# Patient Record
Sex: MALE | Race: WHITE | NOT HISPANIC OR LATINO | Employment: FULL TIME | RURAL
[De-identification: names, ages, dates, MRNs, and addresses within clinical notes are randomized per-mention and may not be internally consistent; named-entity substitution may affect disease eponyms.]

---

## 2021-03-03 ENCOUNTER — HISTORICAL (OUTPATIENT)
Dept: ADMINISTRATIVE | Facility: HOSPITAL | Age: 46
End: 2021-03-03

## 2021-03-07 LAB
REPORT: 25
REPORT: 25
REPORT: 3
REPORT: NORMAL

## 2021-03-31 ENCOUNTER — DOCUMENTATION ONLY (OUTPATIENT)
Dept: DERMATOLOGY | Facility: CLINIC | Age: 46
End: 2021-03-31

## 2021-08-05 DIAGNOSIS — I10 HYPERTENSION, UNSPECIFIED TYPE: Primary | ICD-10-CM

## 2021-08-05 RX ORDER — MELOXICAM 15 MG/1
1 TABLET ORAL DAILY
COMMUNITY
Start: 2021-04-19 | End: 2021-08-05 | Stop reason: SDUPTHER

## 2021-08-05 RX ORDER — HYDROCHLOROTHIAZIDE 25 MG/1
1 TABLET ORAL DAILY
COMMUNITY
Start: 2021-04-19 | End: 2021-08-05 | Stop reason: SDUPTHER

## 2021-08-05 RX ORDER — HYDROCHLOROTHIAZIDE 25 MG/1
25 TABLET ORAL DAILY
Qty: 30 TABLET | Refills: 0 | Status: SHIPPED | OUTPATIENT
Start: 2021-08-05 | End: 2021-09-16 | Stop reason: SDUPTHER

## 2021-08-05 RX ORDER — MELOXICAM 15 MG/1
15 TABLET ORAL DAILY
Qty: 30 TABLET | Refills: 0 | Status: SHIPPED | OUTPATIENT
Start: 2021-08-05 | End: 2021-09-16 | Stop reason: SDUPTHER

## 2021-09-13 ENCOUNTER — TELEPHONE (OUTPATIENT)
Dept: FAMILY MEDICINE | Facility: CLINIC | Age: 46
End: 2021-09-13

## 2021-09-16 ENCOUNTER — OFFICE VISIT (OUTPATIENT)
Dept: FAMILY MEDICINE | Facility: CLINIC | Age: 46
End: 2021-09-16
Payer: COMMERCIAL

## 2021-09-16 VITALS
RESPIRATION RATE: 18 BRPM | TEMPERATURE: 97 F | DIASTOLIC BLOOD PRESSURE: 86 MMHG | HEART RATE: 80 BPM | HEIGHT: 78 IN | WEIGHT: 315 LBS | SYSTOLIC BLOOD PRESSURE: 134 MMHG | BODY MASS INDEX: 36.45 KG/M2

## 2021-09-16 DIAGNOSIS — I10 ESSENTIAL HYPERTENSION: Primary | ICD-10-CM

## 2021-09-16 DIAGNOSIS — M15.9 OSTEOARTHRITIS OF MULTIPLE JOINTS, UNSPECIFIED OSTEOARTHRITIS TYPE: Chronic | ICD-10-CM

## 2021-09-16 DIAGNOSIS — I10 HYPERTENSION, UNSPECIFIED TYPE: ICD-10-CM

## 2021-09-16 LAB
BASOPHILS # BLD AUTO: 0.06 K/UL (ref 0–0.2)
BASOPHILS NFR BLD AUTO: 0.8 % (ref 0–1)
BILIRUB UR QL STRIP: NEGATIVE
CAOX CRY #/AREA URNS LPF: ABNORMAL /LPF
CLARITY UR: CLEAR
COLOR UR: YELLOW
DIFFERENTIAL METHOD BLD: ABNORMAL
EOSINOPHIL # BLD AUTO: 0.18 K/UL (ref 0–0.5)
EOSINOPHIL NFR BLD AUTO: 2.5 % (ref 1–4)
ERYTHROCYTE [DISTWIDTH] IN BLOOD BY AUTOMATED COUNT: 14.6 % (ref 11.5–14.5)
GLUCOSE UR STRIP-MCNC: NEGATIVE MG/DL
HCT VFR BLD AUTO: 46.8 % (ref 40–54)
HGB BLD-MCNC: 15.1 G/DL (ref 13.5–18)
IMM GRANULOCYTES # BLD AUTO: 0.02 K/UL (ref 0–0.04)
IMM GRANULOCYTES NFR BLD: 0.3 % (ref 0–0.4)
KETONES UR STRIP-SCNC: NEGATIVE MG/DL
LEUKOCYTE ESTERASE UR QL STRIP: NEGATIVE
LYMPHOCYTES # BLD AUTO: 2.14 K/UL (ref 1–4.8)
LYMPHOCYTES NFR BLD AUTO: 29.8 % (ref 27–41)
MCH RBC QN AUTO: 29.4 PG (ref 27–31)
MCHC RBC AUTO-ENTMCNC: 32.3 G/DL (ref 32–36)
MCV RBC AUTO: 91.2 FL (ref 80–96)
MONOCYTES # BLD AUTO: 0.48 K/UL (ref 0–0.8)
MONOCYTES NFR BLD AUTO: 6.7 % (ref 2–6)
MPC BLD CALC-MCNC: 9.5 FL (ref 9.4–12.4)
MUCOUS THREADS #/AREA URNS HPF: ABNORMAL /HPF
NEUTROPHILS # BLD AUTO: 4.31 K/UL (ref 1.8–7.7)
NEUTROPHILS NFR BLD AUTO: 59.9 % (ref 53–65)
NITRITE UR QL STRIP: NEGATIVE
NRBC # BLD AUTO: 0 X10E3/UL
NRBC, AUTO (.00): 0 %
PH UR STRIP: 6 PH UNITS
PLATELET # BLD AUTO: 259 K/UL (ref 150–400)
PROT UR QL STRIP: NEGATIVE
RBC # BLD AUTO: 5.13 M/UL (ref 4.6–6.2)
RBC # UR STRIP: ABNORMAL /UL
RBC #/AREA URNS HPF: ABNORMAL /HPF
SP GR UR STRIP: 1.01
SQUAMOUS #/AREA URNS LPF: ABNORMAL /LPF
UROBILINOGEN UR STRIP-ACNC: 0.2 MG/DL
WBC # BLD AUTO: 7.19 K/UL (ref 4.5–11)
WBC #/AREA URNS HPF: ABNORMAL /HPF

## 2021-09-16 PROCEDURE — 82043 UR ALBUMIN QUANTITATIVE: CPT | Mod: ,,, | Performed by: CLINICAL MEDICAL LABORATORY

## 2021-09-16 PROCEDURE — 1160F RVW MEDS BY RX/DR IN RCRD: CPT | Mod: ,,, | Performed by: FAMILY MEDICINE

## 2021-09-16 PROCEDURE — 3075F PR MOST RECENT SYSTOLIC BLOOD PRESS GE 130-139MM HG: ICD-10-PCS | Mod: ,,, | Performed by: FAMILY MEDICINE

## 2021-09-16 PROCEDURE — 81001 URINALYSIS, REFLEX TO URINE CULTURE: ICD-10-PCS | Mod: ,,, | Performed by: CLINICAL MEDICAL LABORATORY

## 2021-09-16 PROCEDURE — 3079F DIAST BP 80-89 MM HG: CPT | Mod: ,,, | Performed by: FAMILY MEDICINE

## 2021-09-16 PROCEDURE — 1159F MED LIST DOCD IN RCRD: CPT | Mod: ,,, | Performed by: FAMILY MEDICINE

## 2021-09-16 PROCEDURE — 82043 MICROALBUMIN / CREATININE RATIO URINE: ICD-10-PCS | Mod: ,,, | Performed by: CLINICAL MEDICAL LABORATORY

## 2021-09-16 PROCEDURE — 80048 BASIC METABOLIC PANEL: ICD-10-PCS | Mod: ,,, | Performed by: CLINICAL MEDICAL LABORATORY

## 2021-09-16 PROCEDURE — 80048 BASIC METABOLIC PNL TOTAL CA: CPT | Mod: ,,, | Performed by: CLINICAL MEDICAL LABORATORY

## 2021-09-16 PROCEDURE — 3079F PR MOST RECENT DIASTOLIC BLOOD PRESSURE 80-89 MM HG: ICD-10-PCS | Mod: ,,, | Performed by: FAMILY MEDICINE

## 2021-09-16 PROCEDURE — 82570 MICROALBUMIN / CREATININE RATIO URINE: ICD-10-PCS | Mod: ,,, | Performed by: CLINICAL MEDICAL LABORATORY

## 2021-09-16 PROCEDURE — 1160F PR REVIEW ALL MEDS BY PRESCRIBER/CLIN PHARMACIST DOCUMENTED: ICD-10-PCS | Mod: ,,, | Performed by: FAMILY MEDICINE

## 2021-09-16 PROCEDURE — 80061 LIPID PANEL: ICD-10-PCS | Mod: ,,, | Performed by: CLINICAL MEDICAL LABORATORY

## 2021-09-16 PROCEDURE — 99214 PR OFFICE/OUTPT VISIT, EST, LEVL IV, 30-39 MIN: ICD-10-PCS | Mod: ,,, | Performed by: FAMILY MEDICINE

## 2021-09-16 PROCEDURE — 99214 OFFICE O/P EST MOD 30 MIN: CPT | Mod: ,,, | Performed by: FAMILY MEDICINE

## 2021-09-16 PROCEDURE — 85025 CBC WITH DIFFERENTIAL: ICD-10-PCS | Mod: ,,, | Performed by: CLINICAL MEDICAL LABORATORY

## 2021-09-16 PROCEDURE — 3075F SYST BP GE 130 - 139MM HG: CPT | Mod: ,,, | Performed by: FAMILY MEDICINE

## 2021-09-16 PROCEDURE — 85025 COMPLETE CBC W/AUTO DIFF WBC: CPT | Mod: ,,, | Performed by: CLINICAL MEDICAL LABORATORY

## 2021-09-16 PROCEDURE — 81001 URINALYSIS AUTO W/SCOPE: CPT | Mod: ,,, | Performed by: CLINICAL MEDICAL LABORATORY

## 2021-09-16 PROCEDURE — 3008F PR BODY MASS INDEX (BMI) DOCUMENTED: ICD-10-PCS | Mod: ,,, | Performed by: FAMILY MEDICINE

## 2021-09-16 PROCEDURE — 1159F PR MEDICATION LIST DOCUMENTED IN MEDICAL RECORD: ICD-10-PCS | Mod: ,,, | Performed by: FAMILY MEDICINE

## 2021-09-16 PROCEDURE — 3008F BODY MASS INDEX DOCD: CPT | Mod: ,,, | Performed by: FAMILY MEDICINE

## 2021-09-16 PROCEDURE — 80061 LIPID PANEL: CPT | Mod: ,,, | Performed by: CLINICAL MEDICAL LABORATORY

## 2021-09-16 PROCEDURE — 82570 ASSAY OF URINE CREATININE: CPT | Mod: ,,, | Performed by: CLINICAL MEDICAL LABORATORY

## 2021-09-16 RX ORDER — HYDROCHLOROTHIAZIDE 25 MG/1
25 TABLET ORAL DAILY
Qty: 90 TABLET | Refills: 1 | Status: SHIPPED | OUTPATIENT
Start: 2021-09-16 | End: 2021-12-01 | Stop reason: SDUPTHER

## 2021-09-16 RX ORDER — MELOXICAM 15 MG/1
15 TABLET ORAL DAILY
Qty: 90 TABLET | Refills: 1 | Status: SHIPPED | OUTPATIENT
Start: 2021-09-16 | End: 2021-12-01 | Stop reason: SDUPTHER

## 2021-09-17 LAB
ANION GAP SERPL CALCULATED.3IONS-SCNC: 10 MMOL/L (ref 7–16)
BUN SERPL-MCNC: 10 MG/DL (ref 7–18)
BUN/CREAT SERPL: 11 (ref 6–20)
CALCIUM SERPL-MCNC: 9.1 MG/DL (ref 8.5–10.1)
CHLORIDE SERPL-SCNC: 109 MMOL/L (ref 98–107)
CHOLEST SERPL-MCNC: 133 MG/DL (ref 0–200)
CHOLEST/HDLC SERPL: 3.9 {RATIO}
CO2 SERPL-SCNC: 25 MMOL/L (ref 21–32)
CREAT SERPL-MCNC: 0.88 MG/DL (ref 0.7–1.3)
GLUCOSE SERPL-MCNC: 105 MG/DL (ref 74–106)
HDLC SERPL-MCNC: 34 MG/DL (ref 40–60)
LDLC SERPL CALC-MCNC: 77 MG/DL
LDLC/HDLC SERPL: 2.3 {RATIO}
NONHDLC SERPL-MCNC: 99 MG/DL
POTASSIUM SERPL-SCNC: 3.3 MMOL/L (ref 3.5–5.1)
SODIUM SERPL-SCNC: 141 MMOL/L (ref 136–145)
TRIGL SERPL-MCNC: 111 MG/DL (ref 35–150)
VLDLC SERPL-MCNC: 22 MG/DL

## 2021-09-22 LAB
CREAT UR-MCNC: 187 MG/DL (ref 39–259)
MICROALBUMIN UR-MCNC: 1.3 MG/DL (ref 0–2.8)
MICROALBUMIN/CREAT RATIO PNL UR: 7 MG/G (ref 0–30)

## 2021-11-24 ENCOUNTER — CLINICAL SUPPORT (OUTPATIENT)
Dept: FAMILY MEDICINE | Facility: CLINIC | Age: 46
End: 2021-11-24
Payer: COMMERCIAL

## 2021-11-24 PROCEDURE — 90471 FLU VACCINE (QUAD) GREATER THAN OR EQUAL TO 3YO PRESERVATIVE FREE IM: ICD-10-PCS | Mod: ,,, | Performed by: FAMILY MEDICINE

## 2021-11-24 PROCEDURE — 90686 IIV4 VACC NO PRSV 0.5 ML IM: CPT | Mod: ,,, | Performed by: FAMILY MEDICINE

## 2021-11-24 PROCEDURE — 90471 IMMUNIZATION ADMIN: CPT | Mod: ,,, | Performed by: FAMILY MEDICINE

## 2021-11-24 PROCEDURE — 90686 FLU VACCINE (QUAD) GREATER THAN OR EQUAL TO 3YO PRESERVATIVE FREE IM: ICD-10-PCS | Mod: ,,, | Performed by: FAMILY MEDICINE

## 2021-12-01 DIAGNOSIS — I10 ESSENTIAL HYPERTENSION: Chronic | ICD-10-CM

## 2021-12-01 DIAGNOSIS — M15.9 OSTEOARTHRITIS OF MULTIPLE JOINTS, UNSPECIFIED OSTEOARTHRITIS TYPE: Chronic | ICD-10-CM

## 2021-12-03 RX ORDER — MELOXICAM 15 MG/1
15 TABLET ORAL DAILY
Qty: 90 TABLET | Refills: 1 | Status: SHIPPED | OUTPATIENT
Start: 2021-12-03 | End: 2022-02-01 | Stop reason: SDUPTHER

## 2021-12-03 RX ORDER — HYDROCHLOROTHIAZIDE 25 MG/1
25 TABLET ORAL DAILY
Qty: 90 TABLET | Refills: 1 | Status: SHIPPED | OUTPATIENT
Start: 2021-12-03 | End: 2022-02-01 | Stop reason: SDUPTHER

## 2022-02-01 ENCOUNTER — OFFICE VISIT (OUTPATIENT)
Dept: FAMILY MEDICINE | Facility: CLINIC | Age: 47
End: 2022-02-01
Payer: COMMERCIAL

## 2022-02-01 VITALS
DIASTOLIC BLOOD PRESSURE: 88 MMHG | OXYGEN SATURATION: 94 % | HEIGHT: 78 IN | WEIGHT: 315 LBS | HEART RATE: 80 BPM | RESPIRATION RATE: 18 BRPM | BODY MASS INDEX: 36.45 KG/M2 | SYSTOLIC BLOOD PRESSURE: 154 MMHG

## 2022-02-01 DIAGNOSIS — I10 ESSENTIAL HYPERTENSION: Chronic | ICD-10-CM

## 2022-02-01 DIAGNOSIS — M15.9 OSTEOARTHRITIS OF MULTIPLE JOINTS, UNSPECIFIED OSTEOARTHRITIS TYPE: Chronic | ICD-10-CM

## 2022-02-01 DIAGNOSIS — L72.3 SEBACEOUS CYST OF AXILLA: Primary | ICD-10-CM

## 2022-02-01 PROCEDURE — 3079F DIAST BP 80-89 MM HG: CPT | Mod: ,,, | Performed by: FAMILY MEDICINE

## 2022-02-01 PROCEDURE — 3008F BODY MASS INDEX DOCD: CPT | Mod: ,,, | Performed by: FAMILY MEDICINE

## 2022-02-01 PROCEDURE — 1159F PR MEDICATION LIST DOCUMENTED IN MEDICAL RECORD: ICD-10-PCS | Mod: ,,, | Performed by: FAMILY MEDICINE

## 2022-02-01 PROCEDURE — 3077F SYST BP >= 140 MM HG: CPT | Mod: ,,, | Performed by: FAMILY MEDICINE

## 2022-02-01 PROCEDURE — 1159F MED LIST DOCD IN RCRD: CPT | Mod: ,,, | Performed by: FAMILY MEDICINE

## 2022-02-01 PROCEDURE — 3077F PR MOST RECENT SYSTOLIC BLOOD PRESSURE >= 140 MM HG: ICD-10-PCS | Mod: ,,, | Performed by: FAMILY MEDICINE

## 2022-02-01 PROCEDURE — 3079F PR MOST RECENT DIASTOLIC BLOOD PRESSURE 80-89 MM HG: ICD-10-PCS | Mod: ,,, | Performed by: FAMILY MEDICINE

## 2022-02-01 PROCEDURE — 3008F PR BODY MASS INDEX (BMI) DOCUMENTED: ICD-10-PCS | Mod: ,,, | Performed by: FAMILY MEDICINE

## 2022-02-01 PROCEDURE — 99214 OFFICE O/P EST MOD 30 MIN: CPT | Mod: ,,, | Performed by: FAMILY MEDICINE

## 2022-02-01 PROCEDURE — 99214 PR OFFICE/OUTPT VISIT, EST, LEVL IV, 30-39 MIN: ICD-10-PCS | Mod: ,,, | Performed by: FAMILY MEDICINE

## 2022-02-01 PROCEDURE — 1160F PR REVIEW ALL MEDS BY PRESCRIBER/CLIN PHARMACIST DOCUMENTED: ICD-10-PCS | Mod: ,,, | Performed by: FAMILY MEDICINE

## 2022-02-01 PROCEDURE — 1160F RVW MEDS BY RX/DR IN RCRD: CPT | Mod: ,,, | Performed by: FAMILY MEDICINE

## 2022-02-01 RX ORDER — MELOXICAM 15 MG/1
15 TABLET ORAL DAILY
Qty: 90 TABLET | Refills: 1 | Status: SHIPPED | OUTPATIENT
Start: 2022-02-01 | End: 2022-02-22 | Stop reason: SDUPTHER

## 2022-02-01 RX ORDER — HYDROCHLOROTHIAZIDE 25 MG/1
25 TABLET ORAL DAILY
Qty: 90 TABLET | Refills: 1 | Status: SHIPPED | OUTPATIENT
Start: 2022-02-01 | End: 2022-02-22 | Stop reason: SDUPTHER

## 2022-02-01 RX ORDER — SULFAMETHOXAZOLE AND TRIMETHOPRIM 800; 160 MG/1; MG/1
1 TABLET ORAL 2 TIMES DAILY
Qty: 20 TABLET | Refills: 0 | Status: SHIPPED | OUTPATIENT
Start: 2022-02-01 | End: 2022-02-22 | Stop reason: ALTCHOICE

## 2022-02-01 NOTE — PROGRESS NOTES
Spike Johnson MD    [address] [place] [address]     PATIENT NAME: Jason Chaudhary  : 1975  DATE: 22  MRN: [id number]      Billing Provider: Spike Johnson MD  Level of Service:   Patient PCP Information     Provider PCP Type    Spike Johnson MD General          Reason for Visit / Chief Complaint: Sore (Patient has a place under his right arm that has been there about a week and won't heal. He states it started out like a zit and it burst and keeps draining. States it is a little painful)       Update PCP  Update Chief Complaint         History of Present Illness / Problem Focused Workflow     Jason Chaudhary presents to the clinic with Sore (Patient has a place under his right arm that has been there about a week and won't heal. He states it started out like a zit and it burst and keeps draining. States it is a little painful)     HPI    Review of Systems     Review of Systems   Constitutional: Negative for activity change, appetite change, chills, fatigue and fever.   HENT: Negative for nasal congestion, ear pain, hearing loss, postnasal drip and sore throat.    Respiratory: Negative for cough, chest tightness, shortness of breath and wheezing.    Cardiovascular: Negative for chest pain, palpitations, leg swelling and claudication.   Gastrointestinal: Negative for abdominal pain, change in bowel habit, constipation, diarrhea, nausea, vomiting and change in bowel habit.   Genitourinary: Negative for dysuria.   Musculoskeletal: Negative for arthralgias, back pain, gait problem and myalgias.   Integumentary:  Negative for rash.   Neurological: Negative for weakness and headaches.   Psychiatric/Behavioral: Negative for suicidal ideas. The patient is not nervous/anxious.         Medical / Social / Family History     History reviewed. No pertinent past medical history.    History reviewed. No pertinent surgical  history.    Social History    reports that he has never smoked. He has never used smokeless tobacco. He reports that he does not drink alcohol and does not use drugs.    Family History  's family history includes Diabetes in his father; Hypertension in his father and mother.    Medications and Allergies     Medications  Outpatient Medications Marked as Taking for the 2/1/22 encounter (Office Visit) with Spike Johnson MD   Medication Sig Dispense Refill    [DISCONTINUED] hydroCHLOROthiazide (HYDRODIURIL) 25 MG tablet Take 1 tablet (25 mg total) by mouth once daily. For BLOOD PRESSURE 90 tablet 1    [DISCONTINUED] meloxicam (MOBIC) 15 MG tablet Take 1 tablet (15 mg total) by mouth once daily. For PAIN and INFLAMMATION 90 tablet 1       Allergies  Review of patient's allergies indicates:   Allergen Reactions    Pcn [penicillins]     Penicillin g potassium Rash       Physical Examination     Vitals:    02/01/22 0906   BP: (!) 154/88   Pulse:    Resp:      Physical Exam  Vitals and nursing note reviewed.   Constitutional:       General: He is not in acute distress.     Appearance: Normal appearance. He is not ill-appearing.   Eyes:      Extraocular Movements: Extraocular movements intact.      Pupils: Pupils are equal, round, and reactive to light.   Cardiovascular:      Rate and Rhythm: Normal rate and regular rhythm.      Heart sounds: Normal heart sounds.   Pulmonary:      Effort: Pulmonary effort is normal.      Breath sounds: Normal breath sounds.   Abdominal:      General: Bowel sounds are normal.      Palpations: Abdomen is soft.   Musculoskeletal:         General: Normal range of motion.   Skin:     Findings: Rash present. Rash is scaling.          Neurological:      General: No focal deficit present.      Mental Status: He is alert and oriented to person, place, and time. Mental status is at baseline.   Psychiatric:         Mood and Affect: Mood normal.         Behavior: Behavior normal.                   Assessment and Plan (including Health Maintenance)      Problem List  Smart Sets  Document Outside HM   :    Plan:         Health Maintenance Due   Topic Date Due    COVID-19 Vaccine (3 - Booster for Pfizer series) 10/08/2021       Problem List Items Addressed This Visit        Cardiac/Vascular    Essential hypertension (Chronic)    Current Assessment & Plan      - Goal blood pressure for most patients is less than 130/85. Both numbers are important. If you have questions about your specific goal blood pressure, please ask at your clinic visits.   - Check blood pressure outside of clinic, record the numbers, and bring the log to all your office visits.   - Take a daily, 81mg Aspirin, unless instructed to take a different dose for another medical purpose. Also do not take Aspirin if you have a history of adverse reaction to Aspirin.   - If you have any chest pain, SOB, or other concerning symptoms, report these immediately, or go to the nearest ER.    - Recommend cardiovascular exercise, at least 3 times per week, for at least 15 minutes.           Relevant Medications    hydroCHLOROthiazide (HYDRODIURIL) 25 MG tablet       Orthopedic    Osteoarthritis of multiple joints (Chronic)    Relevant Medications    meloxicam (MOBIC) 15 MG tablet       Other    Sebaceous cyst of axilla - Primary    Current Assessment & Plan     Started on Bactrim, keep area clean, if symptoms worsen contact clinic. Warm compresses             Sebaceous cyst of axilla    Essential hypertension  -     hydroCHLOROthiazide (HYDRODIURIL) 25 MG tablet; Take 1 tablet (25 mg total) by mouth once daily. For BLOOD PRESSURE  Dispense: 90 tablet; Refill: 1    Osteoarthritis of multiple joints, unspecified osteoarthritis type  -     meloxicam (MOBIC) 15 MG tablet; Take 1 tablet (15 mg total) by mouth once daily. For PAIN and INFLAMMATION  Dispense: 90 tablet; Refill: 1    Other orders  -     sulfamethoxazole-trimethoprim 800-160mg (BACTRIM  DS) 800-160 mg Tab; Take 1 tablet by mouth 2 (two) times daily. For INFECTION  Dispense: 20 tablet; Refill: 0       Health Maintenance Topics with due status: Not Due       Topic Last Completion Date    Lipid Panel 09/16/2021       Procedures     Future Appointments   Date Time Provider Department Center   2/22/2022 12:30 PM Spike Johnson MD [facility] [place]   4/29/2022  9:30 AM Spike Johnson MD [facility] [place]        Follow up in about 6 months (around 8/1/2022), or if symptoms worsen or fail to improve, for chronic health problems.     Signature:  Spike Johnson MD  [address] [place] [address]  Phone #: [phone]  Fax #: [phone]    Date of encounter: 2/1/22    Patient Instructions   Patient Education       Controlling Your Blood Pressure Through Lifestyle   The Basics   Written by the doctors and editors at [facility]   What does my lifestyle have to do with my blood pressure? -- The things you do and the foods you eat have a big effect on your blood pressure and your overall health. Following the right lifestyle can:  · Lower your blood pressure or keep you from getting high blood pressure in the first place  · Reduce your need for blood pressure medicines  · Make medicines for high blood pressure work better, if you do take them  · Lower the chances that you'll have a heart attack or stroke, or develop kidney disease  Which lifestyle choices will help lower my blood pressure? -- Here's what you can do:  · Lose weight (if you are overweight)  · Choose a diet rich in fruits, vegetables, and low-fat dairy products, and low in meats, sweets, and refined grains  · Eat less salt (sodium)  · Do something active for at least 30 minutes a day on most days of the week  · Limit the amount of alcohol you drink  If you have high blood pressure, it's also very important to quit smoking (if you smoke). Quitting smoking might not bring  "your blood pressure down. But it will lower the chances that you'll have a heart attack or stroke, and it will help you feel better and live longer.  Start low and go slow -- The changes listed above might sound like a lot, but don't worry. You don't have to change everything all at once. The key to improving your lifestyle is to "start low and go slow." Choose 1 small, specific thing to change and try doing it for a while. If it works for you, keep doing it until it becomes a habit. If it doesn't, don't give up. Choose something else to change and see how that goes.  Let's say, for example, that you would like to improve your diet. If you're the type of person who eats cheeseburgers and French fries all the time, you can't switch to eating just salads from one day to the next. When people try to make changes like that, they often fail. Then they feel frustrated and tend to give up. So instead of trying to change everything about your diet in 1 day, change 1 or 2 small things about your diet and give yourself time to get used to those changes. For instance, keep the cheeseburger but give up the French fries. Or eat the same things but cut your portions in half.  As you find things that you are able to change and stick with, keep adding new changes. In time, you will see that you can actually change a lot. You just have to get used to the changes slowly.  Lose weight -- When people think about losing weight, they sometimes make it more complicated than it really is. To lose weight, you have to either eat less or move more. If you do both of those things, it's even better. But there is no single weight-loss diet or activity that's better than any other. When it comes to weight loss, the most effective plan is the one that you'll stick with.  Improve your diet -- There is no single diet that is right for everyone. But in general, a healthy diet can include:  · Lots of fruits, vegetables, and whole grains  · Some beans, " "peas, lentils, chickpeas, and similar foods  · Some nuts, such as walnuts, almonds, and peanuts  · Fat-free or low-fat milk and milk products  · Some fish  To have a healthy diet, it's also important to limit or avoid sugar, sweets, meats, and refined grains. (Refined grains are found in white bread, white rice, most forms of pasta, and most packaged "snack" foods.)  Reduce salt -- Many people think that eating a low-sodium diet means avoiding the salt shaker and not adding salt when cooking. The truth is, not adding salt at the table or when you cook will only help a little. Almost all of the sodium you eat is already in the food you buy at the grocery store or at restaurants (figure 1).  The most important thing you can do to cut down on sodium is to eat less processed food. That means that you should avoid most foods that are sold in cans, boxes, jars, and bags. You should also eat in restaurants less often.  To reduce the amount of sodium you get, buy fresh or fresh-frozen fruits, vegetables, and meats. (Fresh-frozen foods have had nothing added to them before freezing.) Then you can make meals at home, from scratch, with these ingredients.  As with the other changes, don't try to cut out salt all at once. Instead, choose 1 or 2 foods that have a lot of sodium and try to replace them with low-sodium choices. When you get used to those low-sodium options, find another food or 2 to change. Then keep going, until all the foods you eat are sodium-free or low in sodium.  Become more active -- If you want to be more active, you don't have to go to the gym or get all sweaty. It is possible to increase your activity level while doing everyday things you enjoy. Walking, gardening, and dancing are just a few of the things that you might try. As with all the other changes, the key is not to do too much too fast. If you don't do any activity now, start by walking for just a few minutes every other day. Do that for a few " "weeks. If you stick with it, try doing it for longer. But if you find that you don't like walking, try a different activity.  Drink less alcohol -- If you are a woman, do not have more than 1 "standard drink" of alcohol a day. If you are a man, do not have more than 2. A "standard drink" is:  · A can or bottle that has 12 ounces of beer  · A glass that has 5 ounces of wine  · A shot that has 1.5 ounces of whiskey  Where should I start? -- If you want to improve your lifestyle, start by making the changes that you think would be easiest for you. If you used to exercise and just got out of the habit, maybe it would be easy for you to start exercising again. Or if you actually like cooking meals from scratch, maybe the first thing you should focus on is eating home-cooked meals that are low in sodium.  Whatever you tackle first, choose specific, realistic goals, and give yourself a deadline. For example, do not decide that you are going to "exercise more." Instead, decide that you are going to walk for 10 minutes on Monday, Wednesday, and Friday, and that you are going to do this for the next 2 weeks.  When lifestyle changes are too general, people have a hard time following through.  Now go. You can do it!  All topics are updated as new evidence becomes available and our peer review process is complete.  This topic retrieved from [employer] on: Sep 21, 2021.  Topic 94121 Version 8.0  Release: 29.4.2 - C29.263  © 2021 UpToDate, Inc. and/or its affiliates. All rights reserved.  figure 1: Sources of sodium in your diet     Graphic 41626 Version 2.0    Consumer Information Use and Disclaimer   This information is not specific medical advice and does not replace information you receive from your health care provider. This is only a brief summary of general information. It does NOT include all information about conditions, illnesses, injuries, tests, procedures, treatments, therapies, discharge instructions or life-style " choices that may apply to you. You must talk with your health care provider for complete information about your health and treatment options. This information should not be used to decide whether or not to accept your health care provider's advice, instructions or recommendations. Only your health care provider has the knowledge and training to provide advice that is right for you. The use of this information is governed by the [employer] End User License Agreement, available at https://www.[employer]/en/solutions/[employer]/about/asim.The use of [employer] content is governed by the [employer] Terms of Use. ©2021 [employer] All rights reserved.  Copyright   © 2021 [employer] Inc. and/or its affiliates. All rights reserved.

## 2022-02-01 NOTE — PATIENT INSTRUCTIONS
"Patient Education       Controlling Your Blood Pressure Through Lifestyle   The Basics   Written by the doctors and editors at [facility]   What does my lifestyle have to do with my blood pressure? -- The things you do and the foods you eat have a big effect on your blood pressure and your overall health. Following the right lifestyle can:  · Lower your blood pressure or keep you from getting high blood pressure in the first place  · Reduce your need for blood pressure medicines  · Make medicines for high blood pressure work better, if you do take them  · Lower the chances that you'll have a heart attack or stroke, or develop kidney disease  Which lifestyle choices will help lower my blood pressure? -- Here's what you can do:  · Lose weight (if you are overweight)  · Choose a diet rich in fruits, vegetables, and low-fat dairy products, and low in meats, sweets, and refined grains  · Eat less salt (sodium)  · Do something active for at least 30 minutes a day on most days of the week  · Limit the amount of alcohol you drink  If you have high blood pressure, it's also very important to quit smoking (if you smoke). Quitting smoking might not bring your blood pressure down. But it will lower the chances that you'll have a heart attack or stroke, and it will help you feel better and live longer.  Start low and go slow -- The changes listed above might sound like a lot, but don't worry. You don't have to change everything all at once. The key to improving your lifestyle is to "start low and go slow." Choose 1 small, specific thing to change and try doing it for a while. If it works for you, keep doing it until it becomes a habit. If it doesn't, don't give up. Choose something else to change and see how that goes.  Let's say, for example, that you would like to improve your diet. If you're the type of person who eats cheeseburgers and French fries all the time, you can't switch to eating just salads from one day to the next. " "When people try to make changes like that, they often fail. Then they feel frustrated and tend to give up. So instead of trying to change everything about your diet in 1 day, change 1 or 2 small things about your diet and give yourself time to get used to those changes. For instance, keep the cheeseburger but give up the French fries. Or eat the same things but cut your portions in half.  As you find things that you are able to change and stick with, keep adding new changes. In time, you will see that you can actually change a lot. You just have to get used to the changes slowly.  Lose weight -- When people think about losing weight, they sometimes make it more complicated than it really is. To lose weight, you have to either eat less or move more. If you do both of those things, it's even better. But there is no single weight-loss diet or activity that's better than any other. When it comes to weight loss, the most effective plan is the one that you'll stick with.  Improve your diet -- There is no single diet that is right for everyone. But in general, a healthy diet can include:  · Lots of fruits, vegetables, and whole grains  · Some beans, peas, lentils, chickpeas, and similar foods  · Some nuts, such as walnuts, almonds, and peanuts  · Fat-free or low-fat milk and milk products  · Some fish  To have a healthy diet, it's also important to limit or avoid sugar, sweets, meats, and refined grains. (Refined grains are found in white bread, white rice, most forms of pasta, and most packaged "snack" foods.)  Reduce salt -- Many people think that eating a low-sodium diet means avoiding the salt shaker and not adding salt when cooking. The truth is, not adding salt at the table or when you cook will only help a little. Almost all of the sodium you eat is already in the food you buy at the grocery store or at restaurants (figure 1).  The most important thing you can do to cut down on sodium is to eat less processed food. " "That means that you should avoid most foods that are sold in cans, boxes, jars, and bags. You should also eat in restaurants less often.  To reduce the amount of sodium you get, buy fresh or fresh-frozen fruits, vegetables, and meats. (Fresh-frozen foods have had nothing added to them before freezing.) Then you can make meals at home, from scratch, with these ingredients.  As with the other changes, don't try to cut out salt all at once. Instead, choose 1 or 2 foods that have a lot of sodium and try to replace them with low-sodium choices. When you get used to those low-sodium options, find another food or 2 to change. Then keep going, until all the foods you eat are sodium-free or low in sodium.  Become more active -- If you want to be more active, you don't have to go to the gym or get all sweaty. It is possible to increase your activity level while doing everyday things you enjoy. Walking, gardening, and dancing are just a few of the things that you might try. As with all the other changes, the key is not to do too much too fast. If you don't do any activity now, start by walking for just a few minutes every other day. Do that for a few weeks. If you stick with it, try doing it for longer. But if you find that you don't like walking, try a different activity.  Drink less alcohol -- If you are a woman, do not have more than 1 "standard drink" of alcohol a day. If you are a man, do not have more than 2. A "standard drink" is:  · A can or bottle that has 12 ounces of beer  · A glass that has 5 ounces of wine  · A shot that has 1.5 ounces of whiskey  Where should I start? -- If you want to improve your lifestyle, start by making the changes that you think would be easiest for you. If you used to exercise and just got out of the habit, maybe it would be easy for you to start exercising again. Or if you actually like cooking meals from scratch, maybe the first thing you should focus on is eating home-cooked meals that " "are low in sodium.  Whatever you tackle first, choose specific, realistic goals, and give yourself a deadline. For example, do not decide that you are going to "exercise more." Instead, decide that you are going to walk for 10 minutes on Monday, Wednesday, and Friday, and that you are going to do this for the next 2 weeks.  When lifestyle changes are too general, people have a hard time following through.  Now go. You can do it!  All topics are updated as new evidence becomes available and our peer review process is complete.  This topic retrieved from [employer] on: Sep 21, 2021.  Topic 01995 Version 8.0  Release: 29.4.2 - C29.263  © 2021 UpToDate, Inc. and/or its affiliates. All rights reserved.  figure 1: Sources of sodium in your diet     Graphic 29543 Version 2.0    Consumer Information Use and Disclaimer   This information is not specific medical advice and does not replace information you receive from your health care provider. This is only a brief summary of general information. It does NOT include all information about conditions, illnesses, injuries, tests, procedures, treatments, therapies, discharge instructions or life-style choices that may apply to you. You must talk with your health care provider for complete information about your health and treatment options. This information should not be used to decide whether or not to accept your health care provider's advice, instructions or recommendations. Only your health care provider has the knowledge and training to provide advice that is right for you. The use of this information is governed by the [employer] End User License Agreement, available at https://www.[employer]/en/solutions/[employer]/about/asim.The use of [employer] content is governed by the [employer] Terms of Use. ©2021 UpToDate, Inc. All rights reserved.  Copyright   © 2021 UpToDate, Inc. and/or its affiliates. All rights reserved.    "

## 2022-02-22 ENCOUNTER — OFFICE VISIT (OUTPATIENT)
Dept: FAMILY MEDICINE | Facility: CLINIC | Age: 47
End: 2022-02-22
Payer: COMMERCIAL

## 2022-02-22 VITALS
TEMPERATURE: 98 F | WEIGHT: 315 LBS | SYSTOLIC BLOOD PRESSURE: 144 MMHG | HEIGHT: 78 IN | OXYGEN SATURATION: 96 % | HEART RATE: 82 BPM | RESPIRATION RATE: 20 BRPM | DIASTOLIC BLOOD PRESSURE: 80 MMHG | BODY MASS INDEX: 36.45 KG/M2

## 2022-02-22 DIAGNOSIS — I10 ESSENTIAL HYPERTENSION: Chronic | ICD-10-CM

## 2022-02-22 DIAGNOSIS — L40.9 PSORIASIS: Primary | Chronic | ICD-10-CM

## 2022-02-22 DIAGNOSIS — L72.3 SEBACEOUS CYST OF AXILLA: Chronic | ICD-10-CM

## 2022-02-22 DIAGNOSIS — M15.9 OSTEOARTHRITIS OF MULTIPLE JOINTS, UNSPECIFIED OSTEOARTHRITIS TYPE: Chronic | ICD-10-CM

## 2022-02-22 PROCEDURE — 1159F PR MEDICATION LIST DOCUMENTED IN MEDICAL RECORD: ICD-10-PCS | Mod: ,,, | Performed by: FAMILY MEDICINE

## 2022-02-22 PROCEDURE — 3077F SYST BP >= 140 MM HG: CPT | Mod: ,,, | Performed by: FAMILY MEDICINE

## 2022-02-22 PROCEDURE — 3008F BODY MASS INDEX DOCD: CPT | Mod: ,,, | Performed by: FAMILY MEDICINE

## 2022-02-22 PROCEDURE — 1160F RVW MEDS BY RX/DR IN RCRD: CPT | Mod: ,,, | Performed by: FAMILY MEDICINE

## 2022-02-22 PROCEDURE — 1160F PR REVIEW ALL MEDS BY PRESCRIBER/CLIN PHARMACIST DOCUMENTED: ICD-10-PCS | Mod: ,,, | Performed by: FAMILY MEDICINE

## 2022-02-22 PROCEDURE — 1159F MED LIST DOCD IN RCRD: CPT | Mod: ,,, | Performed by: FAMILY MEDICINE

## 2022-02-22 PROCEDURE — 3008F PR BODY MASS INDEX (BMI) DOCUMENTED: ICD-10-PCS | Mod: ,,, | Performed by: FAMILY MEDICINE

## 2022-02-22 PROCEDURE — 3079F PR MOST RECENT DIASTOLIC BLOOD PRESSURE 80-89 MM HG: ICD-10-PCS | Mod: ,,, | Performed by: FAMILY MEDICINE

## 2022-02-22 PROCEDURE — 99214 PR OFFICE/OUTPT VISIT, EST, LEVL IV, 30-39 MIN: ICD-10-PCS | Mod: ,,, | Performed by: FAMILY MEDICINE

## 2022-02-22 PROCEDURE — 3077F PR MOST RECENT SYSTOLIC BLOOD PRESSURE >= 140 MM HG: ICD-10-PCS | Mod: ,,, | Performed by: FAMILY MEDICINE

## 2022-02-22 PROCEDURE — 99214 OFFICE O/P EST MOD 30 MIN: CPT | Mod: ,,, | Performed by: FAMILY MEDICINE

## 2022-02-22 PROCEDURE — 3079F DIAST BP 80-89 MM HG: CPT | Mod: ,,, | Performed by: FAMILY MEDICINE

## 2022-02-22 RX ORDER — MINERAL OIL
180 ENEMA (ML) RECTAL DAILY
COMMUNITY

## 2022-02-22 RX ORDER — MELOXICAM 15 MG/1
15 TABLET ORAL DAILY
Qty: 90 TABLET | Refills: 1 | Status: SHIPPED | OUTPATIENT
Start: 2022-02-22 | End: 2022-04-26 | Stop reason: SDUPTHER

## 2022-02-22 RX ORDER — PREDNISONE 5 MG/1
5 TABLET ORAL DAILY
Qty: 28 TABLET | Refills: 0 | Status: SHIPPED | OUTPATIENT
Start: 2022-02-22 | End: 2022-04-26

## 2022-02-22 RX ORDER — HYDROCHLOROTHIAZIDE 25 MG/1
25 TABLET ORAL DAILY
Qty: 90 TABLET | Refills: 1 | Status: SHIPPED | OUTPATIENT
Start: 2022-02-22 | End: 2022-04-26 | Stop reason: SDUPTHER

## 2022-02-22 NOTE — PROGRESS NOTES
Spike Johnson MD    [address] [place] [address]     PATIENT NAME: Jason Chaudhary  : 1975  DATE: 22  MRN: [id number]      Billing Provider: Spike Johnson MD  Level of Service: [place] OFFICE/OUTPT VISIT, EST, LEVL IV, 30-39 MIN  Patient PCP Information     Provider PCP Type    Spike Johnson MD General          Reason for Visit / Chief Complaint: Follow-up (3 month checkup HTN/) and Rash (C/o continued rash to left knee)       Update PCP  Update Chief Complaint         History of Present Illness / Problem Focused Workflow     Jason Chaudhary presents to the clinic with Follow-up (3 month checkup HTN/) and Rash (C/o continued rash to left knee)     HTN - checks occasionally, numbers are similar to todays visit     Multiple joint pain - he feels the mobic is not helping a whole lot, most of his chronic pain his lower and upper extremities     Psoriasis - rash flared up for a few months, does not seem to be responding well to steroid cream     HPI    Review of Systems     Review of Systems   Constitutional: Negative for activity change, appetite change, chills, fatigue and fever.   HENT: Negative for nasal congestion, ear pain, hearing loss, postnasal drip and sore throat.    Respiratory: Negative for cough, chest tightness, shortness of breath and wheezing.    Cardiovascular: Negative for chest pain, palpitations, leg swelling and claudication.   Gastrointestinal: Negative for abdominal pain, change in bowel habit, constipation, diarrhea, nausea, vomiting and change in bowel habit.   Genitourinary: Negative for dysuria.   Musculoskeletal: Negative for arthralgias, back pain, gait problem and myalgias.   Integumentary:  Positive for rash.   Neurological: Negative for weakness and headaches.   Psychiatric/Behavioral: Negative for suicidal ideas. The patient is not nervous/anxious.         Medical / Social / Family History      History reviewed. No pertinent past medical history.    Past Surgical History:   Procedure Laterality Date    RECONSTRUCTION OF CHEST WALL Bilateral     WRIST SURGERY Left     cyst removal       Social History    reports that he has never smoked. He has never used smokeless tobacco. He reports that he does not drink alcohol and does not use drugs.    Family History  's family history includes Diabetes in his father; Hypertension in his father and mother.    Medications and Allergies     Medications  Outpatient Medications Marked as Taking for the 2/22/22 encounter (Office Visit) with Spike Johnson MD   Medication Sig Dispense Refill    fexofenadine (ALLEGRA) 180 MG tablet Take 180 mg by mouth once daily.      [DISCONTINUED] hydroCHLOROthiazide (HYDRODIURIL) 25 MG tablet Take 1 tablet (25 mg total) by mouth once daily. For BLOOD PRESSURE 90 tablet 1    [DISCONTINUED] meloxicam (MOBIC) 15 MG tablet Take 1 tablet (15 mg total) by mouth once daily. For PAIN and INFLAMMATION 90 tablet 1       Allergies  Review of patient's allergies indicates:   Allergen Reactions    Pcn [penicillins]     Penicillin g potassium Rash       Physical Examination     Vitals:    02/22/22 1229   BP: (!) 144/80   Pulse: 82   Resp: 20   Temp: 97.6 °F (36.4 °C)     Physical Exam  Vitals and nursing note reviewed.   Constitutional:       General: He is not in acute distress.     Appearance: Normal appearance. He is not ill-appearing.   Eyes:      Extraocular Movements: Extraocular movements intact.      Pupils: Pupils are equal, round, and reactive to light.   Cardiovascular:      Rate and Rhythm: Normal rate and regular rhythm.      Heart sounds: Normal heart sounds.   Pulmonary:      Effort: Pulmonary effort is normal.      Breath sounds: Normal breath sounds.   Chest:   Breasts:      Left: Axillary adenopathy present.        Comments: Right sided axillary cyst present on exam. See image below  Abdominal:      General:  Bowel sounds are normal.      Palpations: Abdomen is soft.   Musculoskeletal:         General: Normal range of motion.   Lymphadenopathy:      Upper Body:      Left upper body: Axillary adenopathy present.   Skin:     Findings: Rash present. Rash is crusting.          Neurological:      General: No focal deficit present.      Mental Status: He is alert and oriented to person, place, and time. Mental status is at baseline.   Psychiatric:         Mood and Affect: Mood normal.         Behavior: Behavior normal.                  Assessment and Plan (including Health Maintenance)      Problem List  [employer]  Document Outside HM   :    Plan:         Health Maintenance Due   Topic Date Due    COVID-19 Vaccine (3 - Booster for Pfizer series) 09/08/2021       Problem List Items Addressed This Visit        Derm    Psoriasis - Primary (Chronic)    Relevant Medications    predniSONE (DELTASONE) 5 MG tablet       Cardiac/Vascular    Essential hypertension (Chronic)    Relevant Medications    hydroCHLOROthiazide (HYDRODIURIL) 25 MG tablet       Orthopedic    Osteoarthritis of multiple joints (Chronic)    Relevant Medications    meloxicam (MOBIC) 15 MG tablet       Other    Sebaceous cyst of axilla (Chronic)    Current Assessment & Plan     Referred to general surgery for evaluation            Relevant Orders    Ambulatory referral/consult to General Surgery        Psoriasis  -     predniSONE (DELTASONE) 5 MG tablet; Take 1 tablet (5 mg total) by mouth once daily.  Dispense: 28 tablet; Refill: 0    Osteoarthritis of multiple joints, unspecified osteoarthritis type  -     meloxicam (MOBIC) 15 MG tablet; Take 1 tablet (15 mg total) by mouth once daily. For PAIN and INFLAMMATION  Dispense: 90 tablet; Refill: 1    Essential hypertension  -     hydroCHLOROthiazide (HYDRODIURIL) 25 MG tablet; Take 1 tablet (25 mg total) by mouth once daily. For BLOOD PRESSURE  Dispense: 90 tablet; Refill: 1    Sebaceous cyst of axilla  -      Ambulatory referral/consult to General Surgery; Future; Expected date: 03/01/2022       Health Maintenance Topics with due status: Not Due       Topic Last Completion Date    Lipid Panel 09/16/2021       Procedures     Future Appointments   Date Time Provider Department Center   4/29/2022  9:30 AM Spike Johnson MD [facility]   8/23/2022  9:30 AM Spike Johnson MD [facility]        Follow up in about 6 months (around 8/22/2022), or if symptoms worsen or fail to improve, for chronic health problems.     Signature:  Spike Johnson MD  [address] [place] [address]  Phone #: [phone]  Fax #: [phone]    Date of encounter: 2/22/22    There are no Patient Instructions on file for this visit.

## 2022-03-07 ENCOUNTER — OFFICE VISIT (OUTPATIENT)
Dept: SURGERY | Facility: CLINIC | Age: 47
End: 2022-03-07
Attending: SURGERY
Payer: COMMERCIAL

## 2022-03-07 DIAGNOSIS — L72.3 SEBACEOUS CYST OF AXILLA: Chronic | ICD-10-CM

## 2022-03-07 PROCEDURE — 1159F PR MEDICATION LIST DOCUMENTED IN MEDICAL RECORD: ICD-10-PCS | Mod: ,,, | Performed by: SURGERY

## 2022-03-07 PROCEDURE — 17250 CHEM CAUT OF GRANLTJ TISSUE: CPT | Mod: PBBFAC | Performed by: SURGERY

## 2022-03-07 PROCEDURE — 17250 PR CHEM CAUTERY GRANULATN TISSUE: ICD-10-PCS | Mod: S$PBB,,, | Performed by: SURGERY

## 2022-03-07 PROCEDURE — 1160F PR REVIEW ALL MEDS BY PRESCRIBER/CLIN PHARMACIST DOCUMENTED: ICD-10-PCS | Mod: ,,, | Performed by: SURGERY

## 2022-03-07 PROCEDURE — 99203 PR OFFICE/OUTPT VISIT, NEW, LEVL III, 30-44 MIN: ICD-10-PCS | Mod: S$PBB,25,, | Performed by: SURGERY

## 2022-03-07 PROCEDURE — 99203 OFFICE O/P NEW LOW 30 MIN: CPT | Mod: S$PBB,25,, | Performed by: SURGERY

## 2022-03-07 PROCEDURE — 17250 CHEM CAUT OF GRANLTJ TISSUE: CPT | Mod: S$PBB,,, | Performed by: SURGERY

## 2022-03-07 PROCEDURE — 99213 OFFICE O/P EST LOW 20 MIN: CPT | Mod: PBBFAC | Performed by: SURGERY

## 2022-03-07 PROCEDURE — 1160F RVW MEDS BY RX/DR IN RCRD: CPT | Mod: ,,, | Performed by: SURGERY

## 2022-03-07 PROCEDURE — 1159F MED LIST DOCD IN RCRD: CPT | Mod: ,,, | Performed by: SURGERY

## 2022-03-07 NOTE — PROGRESS NOTES
General Surgery History and Physical      Patient ID: Jason Chaudhary is a 46 y.o. male.    Chief Complaint: Cyst (Right axilla)      HPI:  46-year-old male history of cysts that rupture on his body specially neck and armpit area.  He has had 1 on its right axillary region that appeared few weeks ago and then ruptured about a month ago it has been very slow to heal.  Currently he has minimal pain to the area minimal drainage no soreness there.  No fever chills.    Review of Systems   Constitutional: Negative for activity change, appetite change, fatigue and fever.   HENT: Negative for trouble swallowing.    Respiratory: Negative for cough and shortness of breath.    Cardiovascular: Negative for chest pain and palpitations.   Gastrointestinal: Negative for abdominal distention, abdominal pain, blood in stool, constipation and diarrhea.   Genitourinary: Negative for flank pain.   Musculoskeletal: Negative for neck pain and neck stiffness.   Skin: Positive for wound.   Neurological: Negative for weakness.       Current Outpatient Medications   Medication Sig Dispense Refill    fexofenadine (ALLEGRA) 180 MG tablet Take 180 mg by mouth once daily.      hydroCHLOROthiazide (HYDRODIURIL) 25 MG tablet Take 1 tablet (25 mg total) by mouth once daily. For BLOOD PRESSURE 90 tablet 1    meloxicam (MOBIC) 15 MG tablet Take 1 tablet (15 mg total) by mouth once daily. For PAIN and INFLAMMATION 90 tablet 1    predniSONE (DELTASONE) 5 MG tablet Take 1 tablet (5 mg total) by mouth once daily. 28 tablet 0     No current facility-administered medications for this visit.       Review of patient's allergies indicates:   Allergen Reactions    Pcn [penicillins]     Penicillin g potassium Rash       History reviewed. No pertinent past medical history.    Past Surgical History:   Procedure Laterality Date    RECONSTRUCTION OF CHEST  WALL Bilateral     WRIST SURGERY Left     cyst removal       Family History   Problem Relation Age of Onset    Hypertension Mother     Hypertension Father     Diabetes Father        Social History     Socioeconomic History    Marital status: Single   Tobacco Use    Smoking status: Never Smoker    Smokeless tobacco: Never Used   Substance and Sexual Activity    Alcohol use: Never    Drug use: Never    Sexual activity: Yes       There were no vitals filed for this visit.    Physical Exam  Constitutional:       General: He is not in acute distress.     Appearance: He is obese.   HENT:      Head: Normocephalic.   Cardiovascular:      Rate and Rhythm: Normal rate and regular rhythm.      Pulses: Normal pulses.   Pulmonary:      Effort: Pulmonary effort is normal. No respiratory distress.      Breath sounds: Normal breath sounds.   Abdominal:      General: Abdomen is flat. There is no distension.      Palpations: Abdomen is soft.      Tenderness: There is no abdominal tenderness.   Musculoskeletal:         General: Normal range of motion.   Skin:     General: Skin is warm.      Findings: Lesion (Right axilla region has about a 7 mm area with some hypergranulation tissue.  Otherwise no induration, tenderness, erythema, drainage) present.   Neurological:      General: No focal deficit present.      Mental Status: He is oriented to person, place, and time.         Assessment & Plan:    Sebaceous cyst of axilla  -     Ambulatory referral/consult to General Surgery         hypergranulation tissue might be hindering the wound will place some silver nitrate on the area and cauterize it chemically.  Dressing applied.  If it does not close in a month patient will come back.

## 2022-04-26 ENCOUNTER — OFFICE VISIT (OUTPATIENT)
Dept: FAMILY MEDICINE | Facility: CLINIC | Age: 47
End: 2022-04-26
Payer: COMMERCIAL

## 2022-04-26 VITALS
HEIGHT: 78 IN | OXYGEN SATURATION: 93 % | HEART RATE: 77 BPM | SYSTOLIC BLOOD PRESSURE: 136 MMHG | RESPIRATION RATE: 20 BRPM | DIASTOLIC BLOOD PRESSURE: 87 MMHG | WEIGHT: 315 LBS | BODY MASS INDEX: 36.45 KG/M2

## 2022-04-26 DIAGNOSIS — M15.9 OSTEOARTHRITIS OF MULTIPLE JOINTS, UNSPECIFIED OSTEOARTHRITIS TYPE: Chronic | ICD-10-CM

## 2022-04-26 DIAGNOSIS — Z13.1 SCREENING FOR DIABETES MELLITUS: ICD-10-CM

## 2022-04-26 DIAGNOSIS — L40.9 PSORIASIS: Chronic | ICD-10-CM

## 2022-04-26 DIAGNOSIS — M25.50 MULTIPLE JOINT PAIN: Chronic | ICD-10-CM

## 2022-04-26 DIAGNOSIS — I10 ESSENTIAL HYPERTENSION: Chronic | ICD-10-CM

## 2022-04-26 DIAGNOSIS — Z00.01 ENCOUNTER FOR GENERAL ADULT MEDICAL EXAMINATION WITH ABNORMAL FINDINGS: Primary | ICD-10-CM

## 2022-04-26 DIAGNOSIS — Z13.220 SCREENING FOR LIPOID DISORDERS: ICD-10-CM

## 2022-04-26 LAB
ALBUMIN SERPL BCP-MCNC: 3.7 G/DL (ref 3.5–5)
ALBUMIN/GLOB SERPL: 1.2 {RATIO}
ALP SERPL-CCNC: 81 U/L (ref 45–115)
ALT SERPL W P-5'-P-CCNC: 40 U/L (ref 16–61)
ANION GAP SERPL CALCULATED.3IONS-SCNC: 8 MMOL/L (ref 7–16)
AST SERPL W P-5'-P-CCNC: 24 U/L (ref 15–37)
BASOPHILS # BLD AUTO: 0.05 K/UL (ref 0–0.2)
BASOPHILS NFR BLD AUTO: 0.8 % (ref 0–1)
BILIRUB SERPL-MCNC: 0.4 MG/DL (ref 0–1.2)
BUN SERPL-MCNC: 12 MG/DL (ref 7–18)
BUN/CREAT SERPL: 14 (ref 6–20)
CALCIUM SERPL-MCNC: 9 MG/DL (ref 8.5–10.1)
CHLORIDE SERPL-SCNC: 105 MMOL/L (ref 98–107)
CHOLEST SERPL-MCNC: 131 MG/DL (ref 0–200)
CHOLEST/HDLC SERPL: 4.4 {RATIO}
CO2 SERPL-SCNC: 29 MMOL/L (ref 21–32)
CREAT SERPL-MCNC: 0.88 MG/DL (ref 0.7–1.3)
CRP SERPL-MCNC: 0.71 MG/DL (ref 0–0.8)
DIFFERENTIAL METHOD BLD: ABNORMAL
EOSINOPHIL # BLD AUTO: 0.15 K/UL (ref 0–0.5)
EOSINOPHIL NFR BLD AUTO: 2.5 % (ref 1–4)
ERYTHROCYTE [DISTWIDTH] IN BLOOD BY AUTOMATED COUNT: 14.4 % (ref 11.5–14.5)
ERYTHROCYTE [SEDIMENTATION RATE] IN BLOOD BY WESTERGREN METHOD: 16 MM/HR (ref 0–15)
GLOBULIN SER-MCNC: 3.2 G/DL (ref 2–4)
GLUCOSE SERPL-MCNC: 112 MG/DL (ref 74–106)
HCT VFR BLD AUTO: 44.2 % (ref 40–54)
HDLC SERPL-MCNC: 30 MG/DL (ref 40–60)
HGB BLD-MCNC: 14.5 G/DL (ref 13.5–18)
IMM GRANULOCYTES # BLD AUTO: 0.03 K/UL (ref 0–0.04)
IMM GRANULOCYTES NFR BLD: 0.5 % (ref 0–0.4)
LDLC SERPL CALC-MCNC: 77 MG/DL
LDLC/HDLC SERPL: 2.6 {RATIO}
LYMPHOCYTES # BLD AUTO: 1.64 K/UL (ref 1–4.8)
LYMPHOCYTES NFR BLD AUTO: 27.5 % (ref 27–41)
MCH RBC QN AUTO: 29.5 PG (ref 27–31)
MCHC RBC AUTO-ENTMCNC: 32.8 G/DL (ref 32–36)
MCV RBC AUTO: 89.8 FL (ref 80–96)
MONOCYTES # BLD AUTO: 0.4 K/UL (ref 0–0.8)
MONOCYTES NFR BLD AUTO: 6.7 % (ref 2–6)
MPC BLD CALC-MCNC: 10.1 FL (ref 9.4–12.4)
NEUTROPHILS # BLD AUTO: 3.69 K/UL (ref 1.8–7.7)
NEUTROPHILS NFR BLD AUTO: 62 % (ref 53–65)
NONHDLC SERPL-MCNC: 101 MG/DL
NRBC # BLD AUTO: 0 X10E3/UL
NRBC, AUTO (.00): 0 %
PLATELET # BLD AUTO: 231 K/UL (ref 150–400)
POTASSIUM SERPL-SCNC: 3.2 MMOL/L (ref 3.5–5.1)
PROT SERPL-MCNC: 6.9 G/DL (ref 6.4–8.2)
RBC # BLD AUTO: 4.92 M/UL (ref 4.6–6.2)
RHEUMATOID FACT SER NEPH-ACNC: NEGATIVE [IU]/ML
SODIUM SERPL-SCNC: 139 MMOL/L (ref 136–145)
TRIGL SERPL-MCNC: 120 MG/DL (ref 35–150)
VLDLC SERPL-MCNC: 24 MG/DL
WBC # BLD AUTO: 5.96 K/UL (ref 4.5–11)

## 2022-04-26 PROCEDURE — 86038 ANA EIA W/REFLEX DSDNA/ENA: ICD-10-PCS | Mod: ,,, | Performed by: CLINICAL MEDICAL LABORATORY

## 2022-04-26 PROCEDURE — 86430 RHEUMATOID FACTOR SCREEN: ICD-10-PCS | Mod: ,,, | Performed by: CLINICAL MEDICAL LABORATORY

## 2022-04-26 PROCEDURE — 3075F SYST BP GE 130 - 139MM HG: CPT | Mod: ,,, | Performed by: FAMILY MEDICINE

## 2022-04-26 PROCEDURE — 1160F RVW MEDS BY RX/DR IN RCRD: CPT | Mod: ,,, | Performed by: FAMILY MEDICINE

## 2022-04-26 PROCEDURE — 3008F PR BODY MASS INDEX (BMI) DOCUMENTED: ICD-10-PCS | Mod: ,,, | Performed by: FAMILY MEDICINE

## 2022-04-26 PROCEDURE — 3079F PR MOST RECENT DIASTOLIC BLOOD PRESSURE 80-89 MM HG: ICD-10-PCS | Mod: ,,, | Performed by: FAMILY MEDICINE

## 2022-04-26 PROCEDURE — 80053 COMPREHENSIVE METABOLIC PANEL: ICD-10-PCS | Mod: ,,, | Performed by: CLINICAL MEDICAL LABORATORY

## 2022-04-26 PROCEDURE — 85025 COMPLETE CBC W/AUTO DIFF WBC: CPT | Mod: ,,, | Performed by: CLINICAL MEDICAL LABORATORY

## 2022-04-26 PROCEDURE — 99396 PREV VISIT EST AGE 40-64: CPT | Mod: ,,, | Performed by: FAMILY MEDICINE

## 2022-04-26 PROCEDURE — 3079F DIAST BP 80-89 MM HG: CPT | Mod: ,,, | Performed by: FAMILY MEDICINE

## 2022-04-26 PROCEDURE — 86430 RHEUMATOID FACTOR TEST QUAL: CPT | Mod: ,,, | Performed by: CLINICAL MEDICAL LABORATORY

## 2022-04-26 PROCEDURE — 3008F BODY MASS INDEX DOCD: CPT | Mod: ,,, | Performed by: FAMILY MEDICINE

## 2022-04-26 PROCEDURE — 85025 CBC WITH DIFFERENTIAL: ICD-10-PCS | Mod: ,,, | Performed by: CLINICAL MEDICAL LABORATORY

## 2022-04-26 PROCEDURE — 86140 C-REACTIVE PROTEIN: ICD-10-PCS | Mod: ,,, | Performed by: CLINICAL MEDICAL LABORATORY

## 2022-04-26 PROCEDURE — 1159F PR MEDICATION LIST DOCUMENTED IN MEDICAL RECORD: ICD-10-PCS | Mod: ,,, | Performed by: FAMILY MEDICINE

## 2022-04-26 PROCEDURE — 1159F MED LIST DOCD IN RCRD: CPT | Mod: ,,, | Performed by: FAMILY MEDICINE

## 2022-04-26 PROCEDURE — 80053 COMPREHEN METABOLIC PANEL: CPT | Mod: ,,, | Performed by: CLINICAL MEDICAL LABORATORY

## 2022-04-26 PROCEDURE — 85651 SEDIMENTATION RATE, AUTOMATED: ICD-10-PCS | Mod: ,,, | Performed by: CLINICAL MEDICAL LABORATORY

## 2022-04-26 PROCEDURE — 1160F PR REVIEW ALL MEDS BY PRESCRIBER/CLIN PHARMACIST DOCUMENTED: ICD-10-PCS | Mod: ,,, | Performed by: FAMILY MEDICINE

## 2022-04-26 PROCEDURE — 80061 LIPID PANEL: CPT | Mod: ,,, | Performed by: CLINICAL MEDICAL LABORATORY

## 2022-04-26 PROCEDURE — 86140 C-REACTIVE PROTEIN: CPT | Mod: ,,, | Performed by: CLINICAL MEDICAL LABORATORY

## 2022-04-26 PROCEDURE — 99396 PR PREVENTIVE VISIT,EST,40-64: ICD-10-PCS | Mod: ,,, | Performed by: FAMILY MEDICINE

## 2022-04-26 PROCEDURE — 80061 LIPID PANEL: ICD-10-PCS | Mod: ,,, | Performed by: CLINICAL MEDICAL LABORATORY

## 2022-04-26 PROCEDURE — 86038 ANTINUCLEAR ANTIBODIES: CPT | Mod: ,,, | Performed by: CLINICAL MEDICAL LABORATORY

## 2022-04-26 PROCEDURE — 85651 RBC SED RATE NONAUTOMATED: CPT | Mod: ,,, | Performed by: CLINICAL MEDICAL LABORATORY

## 2022-04-26 PROCEDURE — 3075F PR MOST RECENT SYSTOLIC BLOOD PRESS GE 130-139MM HG: ICD-10-PCS | Mod: ,,, | Performed by: FAMILY MEDICINE

## 2022-04-26 RX ORDER — MELOXICAM 15 MG/1
15 TABLET ORAL DAILY
Qty: 90 TABLET | Refills: 1 | Status: SHIPPED | OUTPATIENT
Start: 2022-04-26 | End: 2023-04-27 | Stop reason: SDUPTHER

## 2022-04-26 RX ORDER — HYDROCHLOROTHIAZIDE 25 MG/1
25 TABLET ORAL DAILY
Qty: 90 TABLET | Refills: 1 | Status: SHIPPED | OUTPATIENT
Start: 2022-04-26 | End: 2023-04-27 | Stop reason: SDUPTHER

## 2022-04-26 NOTE — PROGRESS NOTES
Spike Johnson MD    [address] [place] [address]     PATIENT NAME: Jason Chaudhary  : 1975  DATE: 22  MRN: [id number]      Billing Provider: Spike Johnson MD  Level of Service: [place] OFFICE/OUTPT VISIT, EST, LEVL IV, 30-39 MIN  Patient PCP Information     Provider PCP Type    Spike Johnson MD General          Reason for Visit / Chief Complaint: Annual Exam ([facility])       Update PCP  Update Chief Complaint         History of Present Illness / Problem Focused Workflow     Jason Chaudhary presents to the clinic with Annual Exam ([facility])     HPI    Review of Systems     Review of Systems   Constitutional: Negative for activity change, appetite change, chills, fatigue and fever.   HENT: Negative for nasal congestion, ear pain, hearing loss, postnasal drip and sore throat.    Respiratory: Negative for cough, chest tightness, shortness of breath and wheezing.    Cardiovascular: Negative for chest pain, palpitations, leg swelling and claudication.   Gastrointestinal: Negative for abdominal pain, change in bowel habit, constipation, diarrhea, nausea, vomiting and change in bowel habit.   Genitourinary: Negative for dysuria.   Musculoskeletal: Negative for arthralgias, back pain, gait problem and myalgias.   Integumentary:  Negative for rash.   Neurological: Negative for weakness and headaches.   Psychiatric/Behavioral: Negative for suicidal ideas. The patient is not nervous/anxious.         Medical / Social / Family History     Past Medical History:   Diagnosis Date    Allergy        Past Surgical History:   Procedure Laterality Date    RECONSTRUCTION OF CHEST WALL Bilateral     WRIST SURGERY Left     cyst removal       Social History    reports that he has never smoked. He has never used smokeless tobacco. He reports that he does not drink alcohol and does not use drugs.    Family History  Mr.'s family  history includes Diabetes in his father; Hypertension in his father and mother.    Medications and Allergies     Medications  Outpatient Medications Marked as Taking for the 4/26/22 encounter (Office Visit) with Spike Johnson MD   Medication Sig Dispense Refill    fexofenadine (ALLEGRA) 180 MG tablet Take 180 mg by mouth once daily.      [DISCONTINUED] hydroCHLOROthiazide (HYDRODIURIL) 25 MG tablet Take 1 tablet (25 mg total) by mouth once daily. For BLOOD PRESSURE 90 tablet 1    [DISCONTINUED] meloxicam (MOBIC) 15 MG tablet Take 1 tablet (15 mg total) by mouth once daily. For PAIN and INFLAMMATION 90 tablet 1       Allergies  Review of patient's allergies indicates:   Allergen Reactions    Pcn [penicillins]     Penicillin g potassium Rash       Physical Examination     Vitals:    04/26/22 1240   BP: 136/87   Pulse:    Resp:      Physical Exam  Vitals and nursing note reviewed.   Constitutional:       General: He is not in acute distress.     Appearance: Normal appearance. He is not ill-appearing.   Eyes:      Extraocular Movements: Extraocular movements intact.      Pupils: Pupils are equal, round, and reactive to light.   Cardiovascular:      Rate and Rhythm: Normal rate and regular rhythm.      Heart sounds: Normal heart sounds.   Pulmonary:      Effort: Pulmonary effort is normal.      Breath sounds: Normal breath sounds.   Abdominal:      General: Bowel sounds are normal.      Palpations: Abdomen is soft.   Musculoskeletal:         General: Normal range of motion.   Skin:     Findings: Rash present.             Comments: Large erythematous plaque    Neurological:      General: No focal deficit present.      Mental Status: He is alert and oriented to person, place, and time. Mental status is at baseline.   Psychiatric:         Mood and Affect: Mood normal.         Behavior: Behavior normal.          Assessment and Plan (including Health Maintenance)      Problem List  Smart Sets  Document Outside HM    :    Plan:     [employer] [facility] Wellness Plan    Overall Health Goal  Weight Loss    Biometrics  Attend Regularly scheduled office visits  Monitor blood pressure and keep a log       Lifestyle    Schedule colonoscopy  Take medications as prescribed  Develop a good social support system    Nutrition  Avoiding adding table salt to food  Recommend weight loss  Eat well balanced meals  Decrease intake of fast foods    Exercise  Recommend physical activity for at least 30 minutes, 5 days per week     Tobacco   Avoid all tobacco products         Health Maintenance Due   Topic Date Due    COVID-19 Vaccine (3 - Booster for Pfizer series) 09/08/2021       Problem List Items Addressed This Visit        Derm    Psoriasis (Chronic)    Current Assessment & Plan     Ordering Rheum labs today, started referral to Rheumatology. Most likely has Psoriasis, possible Psoriatic arthritis, or other Rheum condition            Relevant Orders    Ambulatory referral/consult to Rheumatology    Sedimentation Rate    C-Reactive Protein    LAUREN EIA w/ Reflex to dsDNA/MISSY    Comprehensive Metabolic Panel    CBC Auto Differential    Rheumatoid Factor Screen       Cardiac/Vascular    Essential hypertension (Chronic)    Relevant Medications    hydroCHLOROthiazide (HYDRODIURIL) 25 MG tablet       Orthopedic    Osteoarthritis of multiple joints (Chronic)    Relevant Medications    meloxicam (MOBIC) 15 MG tablet    Multiple joint pain (Chronic)    Current Assessment & Plan     Rheum labs, referred to Rheum            Relevant Orders    Ambulatory referral/consult to Rheumatology    Sedimentation Rate    C-Reactive Protein    LAUREN EIA w/ Reflex to dsDNA/MISSY    Comprehensive Metabolic Panel    CBC Auto Differential    Rheumatoid Factor Screen      Other Visit Diagnoses     Encounter for general adult medical examination with abnormal findings    -  Primary    Relevant Orders    Lipid Panel    Glucose, Random    Screening for lipoid disorders         Relevant Orders    Lipid Panel    Screening for diabetes mellitus        Relevant Orders    Glucose, Random        Encounter for general adult medical examination with abnormal findings  -     Lipid Panel; Future; Expected date: 04/26/2022  -     Glucose, Random; Future; Expected date: 04/26/2022    Essential hypertension  -     hydroCHLOROthiazide (HYDRODIURIL) 25 MG tablet; Take 1 tablet (25 mg total) by mouth once daily. For BLOOD PRESSURE  Dispense: 90 tablet; Refill: 1    Osteoarthritis of multiple joints, unspecified osteoarthritis type  -     meloxicam (MOBIC) 15 MG tablet; Take 1 tablet (15 mg total) by mouth once daily. For PAIN and INFLAMMATION  Dispense: 90 tablet; Refill: 1    Screening for lipoid disorders  -     Lipid Panel; Future; Expected date: 04/26/2022    Screening for diabetes mellitus  -     Glucose, Random; Future; Expected date: 04/26/2022    Psoriasis  -     Ambulatory referral/consult to Rheumatology; Future; Expected date: 05/03/2022  -     Sedimentation Rate; Future; Expected date: 04/26/2022  -     C-Reactive Protein; Future; Expected date: 04/26/2022  -     LAUREN EIA w/ Reflex to dsDNA/MISSY; Future; Expected date: 04/26/2022  -     Comprehensive Metabolic Panel; Future; Expected date: 04/26/2022  -     CBC Auto Differential; Future; Expected date: 04/26/2022  -     Rheumatoid Factor Screen; Future; Expected date: 04/26/2022    Multiple joint pain  -     Ambulatory referral/consult to Rheumatology; Future; Expected date: 05/03/2022  -     Sedimentation Rate; Future; Expected date: 04/26/2022  -     C-Reactive Protein; Future; Expected date: 04/26/2022  -     LAUREN EIA w/ Reflex to dsDNA/MISSY; Future; Expected date: 04/26/2022  -     Comprehensive Metabolic Panel; Future; Expected date: 04/26/2022  -     CBC Auto Differential; Future; Expected date: 04/26/2022  -     Rheumatoid Factor Screen; Future; Expected date: 04/26/2022       Health Maintenance Topics with due status: Not Due        Topic Last Completion Date    Lipid Panel 09/16/2021       Procedures     Future Appointments   Date Time Provider Department Center   8/23/2022  9:30 AM Spike Johnson MD [facility]   4/27/2023 12:45 PM Spike Johnson MD [facility]        Follow up in about 6 months (around 10/26/2022) for chronic health problems.     Signature:  Spike Johnson MD  [address] [place] [address]  Phone #: [phone]  Fax #: [phone]    Date of encounter: 4/26/22    There are no Patient Instructions on file for this visit.

## 2022-04-26 NOTE — ASSESSMENT & PLAN NOTE
Ordering Rheum labs today, started referral to Rheumatology. Most likely has Psoriasis, possible Psoriatic arthritis, or other Rheum condition

## 2022-04-28 DIAGNOSIS — E87.6 HYPOKALEMIA: Primary | ICD-10-CM

## 2022-04-28 LAB — ANA SER QL: NEGATIVE

## 2022-04-28 NOTE — TELEPHONE ENCOUNTER
----- Message from Spike Johnson MD sent at 4/28/2022 12:22 PM CDT -----  Please let patient know

## 2022-04-29 RX ORDER — POTASSIUM CHLORIDE 750 MG/1
10 CAPSULE, EXTENDED RELEASE ORAL DAILY
Qty: 90 CAPSULE | Refills: 1 | Status: SHIPPED | OUTPATIENT
Start: 2022-04-29 | End: 2023-04-27

## 2022-08-23 ENCOUNTER — OFFICE VISIT (OUTPATIENT)
Dept: FAMILY MEDICINE | Facility: CLINIC | Age: 47
End: 2022-08-23
Payer: COMMERCIAL

## 2022-08-23 VITALS
RESPIRATION RATE: 18 BRPM | SYSTOLIC BLOOD PRESSURE: 134 MMHG | TEMPERATURE: 98 F | HEART RATE: 77 BPM | HEIGHT: 76 IN | DIASTOLIC BLOOD PRESSURE: 84 MMHG | OXYGEN SATURATION: 95 % | WEIGHT: 315 LBS | BODY MASS INDEX: 38.36 KG/M2

## 2022-08-23 DIAGNOSIS — I10 ESSENTIAL HYPERTENSION: Primary | Chronic | ICD-10-CM

## 2022-08-23 DIAGNOSIS — I10 PRIMARY HYPERTENSION: ICD-10-CM

## 2022-08-23 DIAGNOSIS — M25.50 MULTIPLE JOINT PAIN: Chronic | ICD-10-CM

## 2022-08-23 DIAGNOSIS — L40.9 PSORIASIS: Chronic | ICD-10-CM

## 2022-08-23 DIAGNOSIS — M15.9 OSTEOARTHRITIS OF MULTIPLE JOINTS, UNSPECIFIED OSTEOARTHRITIS TYPE: Chronic | ICD-10-CM

## 2022-08-23 LAB
CREAT UR-MCNC: 22 MG/DL (ref 39–259)
MICROALBUMIN UR-MCNC: <0.5 MG/DL (ref 0–2.8)
MICROALBUMIN/CREAT RATIO PNL UR: <22.7 MG/G (ref 0–30)

## 2022-08-23 PROCEDURE — 3008F PR BODY MASS INDEX (BMI) DOCUMENTED: ICD-10-PCS | Mod: ,,, | Performed by: FAMILY MEDICINE

## 2022-08-23 PROCEDURE — 82570 MICROALBUMIN / CREATININE RATIO URINE: ICD-10-PCS | Mod: ,,, | Performed by: CLINICAL MEDICAL LABORATORY

## 2022-08-23 PROCEDURE — 3079F DIAST BP 80-89 MM HG: CPT | Mod: ,,, | Performed by: FAMILY MEDICINE

## 2022-08-23 PROCEDURE — 3075F PR MOST RECENT SYSTOLIC BLOOD PRESS GE 130-139MM HG: ICD-10-PCS | Mod: ,,, | Performed by: FAMILY MEDICINE

## 2022-08-23 PROCEDURE — 1160F RVW MEDS BY RX/DR IN RCRD: CPT | Mod: ,,, | Performed by: FAMILY MEDICINE

## 2022-08-23 PROCEDURE — 3008F BODY MASS INDEX DOCD: CPT | Mod: ,,, | Performed by: FAMILY MEDICINE

## 2022-08-23 PROCEDURE — 99214 PR OFFICE/OUTPT VISIT, EST, LEVL IV, 30-39 MIN: ICD-10-PCS | Mod: ,,, | Performed by: FAMILY MEDICINE

## 2022-08-23 PROCEDURE — 82043 MICROALBUMIN / CREATININE RATIO URINE: ICD-10-PCS | Mod: ,,, | Performed by: CLINICAL MEDICAL LABORATORY

## 2022-08-23 PROCEDURE — 82570 ASSAY OF URINE CREATININE: CPT | Mod: ,,, | Performed by: CLINICAL MEDICAL LABORATORY

## 2022-08-23 PROCEDURE — 3075F SYST BP GE 130 - 139MM HG: CPT | Mod: ,,, | Performed by: FAMILY MEDICINE

## 2022-08-23 PROCEDURE — 82043 UR ALBUMIN QUANTITATIVE: CPT | Mod: ,,, | Performed by: CLINICAL MEDICAL LABORATORY

## 2022-08-23 PROCEDURE — 3079F PR MOST RECENT DIASTOLIC BLOOD PRESSURE 80-89 MM HG: ICD-10-PCS | Mod: ,,, | Performed by: FAMILY MEDICINE

## 2022-08-23 PROCEDURE — 1159F PR MEDICATION LIST DOCUMENTED IN MEDICAL RECORD: ICD-10-PCS | Mod: ,,, | Performed by: FAMILY MEDICINE

## 2022-08-23 PROCEDURE — 99214 OFFICE O/P EST MOD 30 MIN: CPT | Mod: ,,, | Performed by: FAMILY MEDICINE

## 2022-08-23 PROCEDURE — 1160F PR REVIEW ALL MEDS BY PRESCRIBER/CLIN PHARMACIST DOCUMENTED: ICD-10-PCS | Mod: ,,, | Performed by: FAMILY MEDICINE

## 2022-08-23 PROCEDURE — 1159F MED LIST DOCD IN RCRD: CPT | Mod: ,,, | Performed by: FAMILY MEDICINE

## 2022-08-23 NOTE — PROGRESS NOTES
Spike Johnson MD    [address] [place] [address]     PATIENT NAME: Jason Chaudhary  : 1975  DATE: 22  MRN: [id number]      Billing Provider: Spike Johnson MD  Level of Service:   Patient PCP Information     Provider PCP Type    Spike Johnson MD General          Reason for Visit / Chief Complaint: Color Me Healthy (Patient stated he is here for a Color Me Healthy visit for HTN. Patient is not fasting for labs. )       Update PCP  Update Chief Complaint         History of Present Illness / Problem Focused Workflow     Jason Chaudhary presents to the clinic with Color Me Healthy (Patient stated he is here for a Color Me Healthy visit for HTN. Patient is not fasting for labs. )     HPI    Review of Systems     Review of Systems   Constitutional: Negative for activity change, appetite change, chills, fatigue and fever.   HENT: Negative for nasal congestion, ear pain, hearing loss, postnasal drip and sore throat.    Respiratory: Negative for cough, chest tightness, shortness of breath and wheezing.    Cardiovascular: Negative for chest pain, palpitations, leg swelling and claudication.   Gastrointestinal: Negative for abdominal pain, change in bowel habit, constipation, diarrhea, nausea, vomiting and change in bowel habit.   Genitourinary: Negative for dysuria.   Musculoskeletal: Negative for arthralgias, back pain, gait problem and myalgias.   Integumentary:  Negative for rash.   Neurological: Negative for weakness and headaches.   Psychiatric/Behavioral: Negative for suicidal ideas. The patient is not nervous/anxious.         Medical / Social / Family History     Past Medical History:   Diagnosis Date    Allergy     Hypertension        Past Surgical History:   Procedure Laterality Date    RECONSTRUCTION OF CHEST WALL Bilateral     WRIST SURGERY Left     cyst removal       Social History    reports that he has never  smoked. He has never used smokeless tobacco. He reports that he does not drink alcohol and does not use drugs.    Family History  's family history includes Diabetes in his father; Hypertension in his father and mother.    Medications and Allergies     Medications  Outpatient Medications Marked as Taking for the 8/23/22 encounter (Office Visit) with Spike Johnson MD   Medication Sig Dispense Refill    fexofenadine (ALLEGRA) 180 MG tablet Take 180 mg by mouth once daily.      hydroCHLOROthiazide (HYDRODIURIL) 25 MG tablet Take 1 tablet (25 mg total) by mouth once daily. For BLOOD PRESSURE 90 tablet 1    meloxicam (MOBIC) 15 MG tablet Take 1 tablet (15 mg total) by mouth once daily. For PAIN and INFLAMMATION 90 tablet 1    potassium chloride (MICRO-K) 10 MEQ CpSR Take 1 capsule (10 mEq total) by mouth once daily. 90 capsule 1       Allergies  Review of patient's allergies indicates:   Allergen Reactions    Pcn [penicillins]     Penicillin g potassium Rash       Physical Examination     Vitals:    08/23/22 0951   BP: 134/84   Pulse:    Resp:    Temp:      Physical Exam  Vitals and nursing note reviewed.   Constitutional:       General: He is not in acute distress.     Appearance: Normal appearance. He is not ill-appearing.   Eyes:      Extraocular Movements: Extraocular movements intact.      Pupils: Pupils are equal, round, and reactive to light.   Cardiovascular:      Rate and Rhythm: Normal rate and regular rhythm.      Heart sounds: Normal heart sounds.   Pulmonary:      Effort: Pulmonary effort is normal.      Breath sounds: Normal breath sounds.   Abdominal:      General: Bowel sounds are normal.      Palpations: Abdomen is soft.   Musculoskeletal:         General: Normal range of motion.   Skin:     Findings: No rash.   Neurological:      General: No focal deficit present.      Mental Status: He is alert and oriented to person, place, and time. Mental status is at baseline.   Psychiatric:          Mood and Affect: Mood normal.         Behavior: Behavior normal.          Assessment and Plan (including Health Maintenance)      Problem List  Smart Sets  Document Outside HM   :    Plan:         Health Maintenance Due   Topic Date Due    COVID-19 Vaccine (3 - Booster for Pfizer series) 09/08/2021       Problem List Items Addressed This Visit        Derm    Psoriasis (Chronic)       Cardiac/Vascular    Essential hypertension - Primary (Chronic)    Relevant Orders    Microalbumin/Creatinine Ratio, Urine       Orthopedic    Osteoarthritis of multiple joints (Chronic)    Multiple joint pain (Chronic)      Other Visit Diagnoses     Primary hypertension            Essential hypertension  -     Microalbumin/Creatinine Ratio, Urine; Future; Expected date: 08/23/2022    Primary hypertension    Psoriasis    Multiple joint pain    Osteoarthritis of multiple joints, unspecified osteoarthritis type       Health Maintenance Topics with due status: Not Due       Topic Last Completion Date    Influenza Vaccine 11/24/2021    Lipid Panel 04/26/2022       Procedures     Future Appointments   Date Time Provider Department Center   8/25/2022  9:20 AM Isabelle Day MD [facility]   4/27/2023 12:45 PM Spike Johnson MD [facility]        Follow up in 3 months (on 11/23/2022) for chronic health problems.     Signature:  Spike Johnson MD  [address] [place] [address]  Phone #: [phone]  Fax #: [phone]    Date of encounter: 8/23/22    Patient Instructions       Patient Education       High Blood Pressure in Adults   The Basics   Written by the doctors and editors at UpToDate   What is high blood pressure? -- High blood pressure is a condition that puts you at risk for heart attack, stroke, and kidney disease. It does not usually cause symptoms. But it can be serious.  When your doctor or nurse tells you your blood pressure, they will say 2  "numbers. For instance, your doctor or nurse might say that your blood pressure is "130 over 80." The top number is the pressure inside your arteries when your heart is ale. The bottom number is the pressure inside your arteries when your heart is relaxed.  "Elevated blood pressure" is a term doctors or nurses use as a warning. People with elevated blood pressure do not yet have high blood pressure. But their blood pressure is not as low as it should be for good health.  Many experts define high, elevated, and normal blood pressure as follows:  · High - Top number of 130 or above and/or bottom number of 80 or above  · Elevated - Top number between 120 and 129 and bottom number of 79 or below  · Normal - Top number of 119 or below and bottom number of 79 or below  This information is also in the table (table 1).   How can I lower my blood pressure? -- If your doctor or nurse has prescribed blood pressure medicine, the most important thing you can do is to take it. If it causes side effects, do not just stop taking it. Instead, talk to your doctor or nurse about the problems it causes. They might be able to lower your dose or switch you to another medicine. If cost is a problem, mention that too. They might be able to put you on a less expensive medicine. Taking your blood pressure medicine can keep you from having a heart attack or stroke, and it can save your life!  Can I do anything on my own? -- You have a lot of control over your blood pressure. To lower it:  · Lose weight (if you are overweight)  · Choose a diet low in fat and rich in fruits, vegetables, and low-fat dairy products  · Reduce the amount of salt you eat  · Do something active for at least 30 minutes a day on most days of the week  · Cut down on alcohol (if you drink more than 2 alcoholic drinks per day)  It's also a good idea to get a home blood pressure meter. People who check their own blood pressure at home do better at keeping it low and " "can sometimes even reduce the amount of medicine they take.  All topics are updated as new evidence becomes available and our peer review process is complete.  This topic retrieved from [employer] on: Sep 21, 2021.  Topic 94936 Version 15.0  Release: 29.4.2 - C29.263  © 2021 UpToDate, Inc. and/or its affiliates. All rights reserved.  table 1: Definition of normal and high blood pressure  Level  Top number  Bottom number    High 130 or above 80 or above   Elevated 120 to 129 79 or below   Normal 119 or below 79 or below   · These definitions are from the American College of Cardiology/American Heart Association. Other expert groups might use slightly different definitions.  · "Elevated blood pressure" is a term doctor or nurses use as a warning. It means you do not yet have high blood pressure, but your blood pressure is not as low as it should be for good health.  Graphic 70033 Version 6.0  Consumer Information Use and Disclaimer   This information is not specific medical advice and does not replace information you receive from your health care provider. This is only a brief summary of general information. It does NOT include all information about conditions, illnesses, injuries, tests, procedures, treatments, therapies, discharge instructions or life-style choices that may apply to you. You must talk with your health care provider for complete information about your health and treatment options. This information should not be used to decide whether or not to accept your health care provider's advice, instructions or recommendations. Only your health care provider has the knowledge and training to provide advice that is right for you. The use of this information is governed by the [employer] End User License Agreement, available at https://www.Weave.[employer]/en/solutions/PenPath/about/asim.The use of [employer] content is governed by the [employer] Terms of Use. ©2021 UpToDate, Inc. All rights reserved.  Copyright "   © 2021 [employer] and/or its affiliates. All rights reserved.

## 2022-08-23 NOTE — PATIENT INSTRUCTIONS
"  Patient Education       High Blood Pressure in Adults   The Basics   Written by the doctors and editors at [facility]   What is high blood pressure? -- High blood pressure is a condition that puts you at risk for heart attack, stroke, and kidney disease. It does not usually cause symptoms. But it can be serious.  When your doctor or nurse tells you your blood pressure, they will say 2 numbers. For instance, your doctor or nurse might say that your blood pressure is "130 over 80." The top number is the pressure inside your arteries when your heart is ale. The bottom number is the pressure inside your arteries when your heart is relaxed.  "Elevated blood pressure" is a term doctors or nurses use as a warning. People with elevated blood pressure do not yet have high blood pressure. But their blood pressure is not as low as it should be for good health.  Many experts define high, elevated, and normal blood pressure as follows:  · High - Top number of 130 or above and/or bottom number of 80 or above  · Elevated - Top number between 120 and 129 and bottom number of 79 or below  · Normal - Top number of 119 or below and bottom number of 79 or below  This information is also in the table (table 1).   How can I lower my blood pressure? -- If your doctor or nurse has prescribed blood pressure medicine, the most important thing you can do is to take it. If it causes side effects, do not just stop taking it. Instead, talk to your doctor or nurse about the problems it causes. They might be able to lower your dose or switch you to another medicine. If cost is a problem, mention that too. They might be able to put you on a less expensive medicine. Taking your blood pressure medicine can keep you from having a heart attack or stroke, and it can save your life!  Can I do anything on my own? -- You have a lot of control over your blood pressure. To lower it:  · Lose weight (if you are overweight)  · Choose a diet low in fat " "and rich in fruits, vegetables, and low-fat dairy products  · Reduce the amount of salt you eat  · Do something active for at least 30 minutes a day on most days of the week  · Cut down on alcohol (if you drink more than 2 alcoholic drinks per day)  It's also a good idea to get a home blood pressure meter. People who check their own blood pressure at home do better at keeping it low and can sometimes even reduce the amount of medicine they take.  All topics are updated as new evidence becomes available and our peer review process is complete.  This topic retrieved from [employer] on: Sep 21, 2021.  Topic 63585 Version 15.0  Release: 29.4.2 - C29.263  © 2021 UpToDate, Inc. and/or its affiliates. All rights reserved.  table 1: Definition of normal and high blood pressure  Level  Top number  Bottom number    High 130 or above 80 or above   Elevated 120 to 129 79 or below   Normal 119 or below 79 or below   · These definitions are from the American College of Cardiology/American Heart Association. Other expert groups might use slightly different definitions.  · "Elevated blood pressure" is a term doctor or nurses use as a warning. It means you do not yet have high blood pressure, but your blood pressure is not as low as it should be for good health.  Graphic 16467 Version 6.0  Consumer Information Use and Disclaimer   This information is not specific medical advice and does not replace information you receive from your health care provider. This is only a brief summary of general information. It does NOT include all information about conditions, illnesses, injuries, tests, procedures, treatments, therapies, discharge instructions or life-style choices that may apply to you. You must talk with your health care provider for complete information about your health and treatment options. This information should not be used to decide whether or not to accept your health care provider's advice, instructions or recommendations. " Only your health care provider has the knowledge and training to provide advice that is right for you. The use of this information is governed by the [employer] End User License Agreement, available at https://www.[employer]Star Research.[employer]/en/solutions/Muzeek/about/asim.The use of [employer] content is governed by the [employer] Terms of Use. ©2021 UpToDate, Inc. All rights reserved.  Copyright   © 2021 UpToDate, Inc. and/or its affiliates. All rights reserved.

## 2022-08-25 ENCOUNTER — OFFICE VISIT (OUTPATIENT)
Dept: RHEUMATOLOGY | Facility: CLINIC | Age: 47
End: 2022-08-25
Payer: COMMERCIAL

## 2022-08-25 VITALS
OXYGEN SATURATION: 96 % | RESPIRATION RATE: 16 BRPM | WEIGHT: 315 LBS | BODY MASS INDEX: 36.45 KG/M2 | DIASTOLIC BLOOD PRESSURE: 86 MMHG | HEIGHT: 78 IN | HEART RATE: 86 BPM | SYSTOLIC BLOOD PRESSURE: 162 MMHG

## 2022-08-25 DIAGNOSIS — L40.9 PSORIASIS: Chronic | ICD-10-CM

## 2022-08-25 DIAGNOSIS — L40.53 PSORIATIC SPONDYLITIS: Primary | ICD-10-CM

## 2022-08-25 DIAGNOSIS — M25.50 MULTIPLE JOINT PAIN: Chronic | ICD-10-CM

## 2022-08-25 PROCEDURE — 3061F PR NEG MICROALBUMINURIA RESULT DOCUMENTED/REVIEW: ICD-10-PCS | Mod: ,,, | Performed by: INTERNAL MEDICINE

## 2022-08-25 PROCEDURE — 3008F PR BODY MASS INDEX (BMI) DOCUMENTED: ICD-10-PCS | Mod: ,,, | Performed by: INTERNAL MEDICINE

## 2022-08-25 PROCEDURE — 3079F DIAST BP 80-89 MM HG: CPT | Mod: ,,, | Performed by: INTERNAL MEDICINE

## 2022-08-25 PROCEDURE — 3061F NEG MICROALBUMINURIA REV: CPT | Mod: ,,, | Performed by: INTERNAL MEDICINE

## 2022-08-25 PROCEDURE — 1159F MED LIST DOCD IN RCRD: CPT | Mod: ,,, | Performed by: INTERNAL MEDICINE

## 2022-08-25 PROCEDURE — 99205 OFFICE O/P NEW HI 60 MIN: CPT | Mod: S$PBB,,, | Performed by: INTERNAL MEDICINE

## 2022-08-25 PROCEDURE — 3077F SYST BP >= 140 MM HG: CPT | Mod: ,,, | Performed by: INTERNAL MEDICINE

## 2022-08-25 PROCEDURE — 3077F PR MOST RECENT SYSTOLIC BLOOD PRESSURE >= 140 MM HG: ICD-10-PCS | Mod: ,,, | Performed by: INTERNAL MEDICINE

## 2022-08-25 PROCEDURE — 1159F PR MEDICATION LIST DOCUMENTED IN MEDICAL RECORD: ICD-10-PCS | Mod: ,,, | Performed by: INTERNAL MEDICINE

## 2022-08-25 PROCEDURE — 99214 OFFICE O/P EST MOD 30 MIN: CPT | Mod: PBBFAC | Performed by: INTERNAL MEDICINE

## 2022-08-25 PROCEDURE — 3066F NEPHROPATHY DOC TX: CPT | Mod: ,,, | Performed by: INTERNAL MEDICINE

## 2022-08-25 PROCEDURE — 3008F BODY MASS INDEX DOCD: CPT | Mod: ,,, | Performed by: INTERNAL MEDICINE

## 2022-08-25 PROCEDURE — 3066F PR DOCUMENTATION OF TREATMENT FOR NEPHROPATHY: ICD-10-PCS | Mod: ,,, | Performed by: INTERNAL MEDICINE

## 2022-08-25 PROCEDURE — 1160F RVW MEDS BY RX/DR IN RCRD: CPT | Mod: ,,, | Performed by: INTERNAL MEDICINE

## 2022-08-25 PROCEDURE — 99205 PR OFFICE/OUTPT VISIT, NEW, LEVL V, 60-74 MIN: ICD-10-PCS | Mod: S$PBB,,, | Performed by: INTERNAL MEDICINE

## 2022-08-25 PROCEDURE — 1160F PR REVIEW ALL MEDS BY PRESCRIBER/CLIN PHARMACIST DOCUMENTED: ICD-10-PCS | Mod: ,,, | Performed by: INTERNAL MEDICINE

## 2022-08-25 PROCEDURE — 3079F PR MOST RECENT DIASTOLIC BLOOD PRESSURE 80-89 MM HG: ICD-10-PCS | Mod: ,,, | Performed by: INTERNAL MEDICINE

## 2022-08-25 RX ORDER — MELOXICAM 15 MG/1
15 TABLET ORAL DAILY
Qty: 60 TABLET | Refills: 1 | Status: SHIPPED | OUTPATIENT
Start: 2022-08-25 | End: 2022-12-03

## 2022-08-25 RX ORDER — ADALIMUMAB 40MG/0.8ML
40 KIT SUBCUTANEOUS
Qty: 2 EACH | Refills: 2 | Status: SHIPPED | OUTPATIENT
Start: 2022-08-25 | End: 2022-09-12 | Stop reason: SDUPTHER

## 2022-08-25 NOTE — PROGRESS NOTES
Isabelle Day MD   [facility]  [address] [facility] [place] [address]  [phone]      PATIENT NAME: Jason Chaudhary  : 1975  DATE: 22  MRN: [id number]      Billing Provider: Isabelle Day MD  Level of Service:   Patient PCP Information     Provider PCP Type    Spike Johnson MD General          Reason for Visit / Chief Complaint: Joint Pain (C/o joint pain. )           Assessment and Plan (including Health Maintenance)      Problem List  Smart Sets  Document Outside HM   :    Plan:     Chronic psoriasis but newly dx psoriatic spondylitis.   Started bDMARD today.       ICD-10-CM ICD-9-CM    1. Psoriatic spondylitis  L40.53 696.0 adalimumab (HUMIRA) 40 mg/0.8 mL SyKt injection     720.81 varicella-zoster gE vac,2 of 2 50 mcg SusR   2. Psoriasis  L40.9 696.1 Ambulatory referral/consult to Rheumatology      HLA-B27 antigen      Uric Acid      Hepatitis C Antibody      Hepatitis B Surface Antigen      Hepatitis B Surface Ab, Qualitative      Quantiferon Gold TB      Cyclic Citrullinated Peptide Antibody, IgG      Rheumatoid Quantitative      C-Reactive Protein      Sedimentation Rate      meloxicam (MOBIC) 15 MG tablet   3. Multiple joint pain  M25.50 719.49 Ambulatory referral/consult to Rheumatology         Psoriasis  -     Ambulatory referral/consult to Rheumatology    Multiple joint pain  -     Ambulatory referral/consult to Rheumatology                       History of Present Illness / Problem Focused Workflow     Jason Chaudhary presents to the clinic with Joint Pain (C/o joint pain. )     Has had psoriasis for atleast a decade. Currently has a patch on the left side of the leg.   Has used primarily topical agents.   Affirms global stiffness affecting neck , shoulder and lower back. Takes Aleve once a week. States he has to stretch his ankles every morning before weight bearing.       Review of Systems     Answers for  HPI/ROS submitted by the patient on 8/23/2022  fever: No  eye redness: No  mouth sores: No  headaches: No  shortness of breath: No  chest pain: No  trouble swallowing: No  diarrhea: No  constipation: No  unexpected weight change: No  genital sore: No  During the last 3 days, have you had a skin rash?: No  Bruises or bleeds easily: No  cough: No        Medical / Social / Family History     Past Medical History:   Diagnosis Date    Allergy     Hypertension        Past Surgical History:   Procedure Laterality Date    RECONSTRUCTION OF CHEST WALL Bilateral     WRIST SURGERY Left     cyst removal       Social History  Mr. Jason Chaudhary  reports that he has never smoked. He has never used smokeless tobacco. He reports that he does not drink alcohol and does not use drugs.    Family History  's Jason Chaudhary family history includes Diabetes in his father; Hypertension in his father and mother.    Medications and Allergies     Medications  Outpatient Medications Marked as Taking for the 8/25/22 encounter (Office Visit) with Isabelle Day MD   Medication Sig Dispense Refill    fexofenadine (ALLEGRA) 180 MG tablet Take 180 mg by mouth once daily.      hydroCHLOROthiazide (HYDRODIURIL) 25 MG tablet Take 1 tablet (25 mg total) by mouth once daily. For BLOOD PRESSURE 90 tablet 1    meloxicam (MOBIC) 15 MG tablet Take 1 tablet (15 mg total) by mouth once daily. For PAIN and INFLAMMATION 90 tablet 1    multivit-min/ferrous fumarate (MULTI VITAMIN ORAL) Take by mouth.      potassium chloride (MICRO-K) 10 MEQ CpSR Take 1 capsule (10 mEq total) by mouth once daily. 90 capsule 1       Allergies  Review of patient's allergies indicates:   Allergen Reactions    Pcn [penicillins]     Penicillin g potassium Rash       Physical Examination     Vitals:    08/25/22 0914   BP: (!) 162/86   Pulse: 86   Resp: 16     Physical Exam  Constitutional:       Appearance: He is obese.   Musculoskeletal:          General: Tenderness present.      Comments: Scar on left wrist from prior ganglion cyst removal.                 Signature:  Isabelle Day MD  [facility]  [address] [facility] [address]  [phone]    Date of encounter: 8/25/22

## 2022-09-06 ENCOUNTER — PATIENT MESSAGE (OUTPATIENT)
Dept: RHEUMATOLOGY | Facility: CLINIC | Age: 47
End: 2022-09-06
Payer: COMMERCIAL

## 2022-09-12 ENCOUNTER — PATIENT MESSAGE (OUTPATIENT)
Dept: RHEUMATOLOGY | Facility: CLINIC | Age: 47
End: 2022-09-12
Payer: COMMERCIAL

## 2022-09-12 DIAGNOSIS — L40.53 PSORIATIC SPONDYLITIS: ICD-10-CM

## 2022-09-12 RX ORDER — ADALIMUMAB 40MG/0.8ML
40 KIT SUBCUTANEOUS
Qty: 2 EACH | Refills: 2 | Status: SHIPPED | OUTPATIENT
Start: 2022-09-12 | End: 2022-11-15

## 2022-09-12 NOTE — TELEPHONE ENCOUNTER
Received PA Approval for Humira from [facility] FEP. RX sent to [facility]. Pt notified

## 2022-11-01 ENCOUNTER — CLINICAL SUPPORT (OUTPATIENT)
Dept: FAMILY MEDICINE | Facility: CLINIC | Age: 47
End: 2022-11-01
Payer: COMMERCIAL

## 2022-11-01 PROCEDURE — 90686 FLU VACCINE (QUAD) GREATER THAN OR EQUAL TO 3YO PRESERVATIVE FREE IM: ICD-10-PCS | Mod: ,,, | Performed by: FAMILY MEDICINE

## 2022-11-01 PROCEDURE — 90686 IIV4 VACC NO PRSV 0.5 ML IM: CPT | Mod: ,,, | Performed by: FAMILY MEDICINE

## 2022-11-01 PROCEDURE — 90471 FLU VACCINE (QUAD) GREATER THAN OR EQUAL TO 3YO PRESERVATIVE FREE IM: ICD-10-PCS | Mod: ,,, | Performed by: FAMILY MEDICINE

## 2022-11-01 PROCEDURE — 90471 IMMUNIZATION ADMIN: CPT | Mod: ,,, | Performed by: FAMILY MEDICINE

## 2022-11-02 ENCOUNTER — PATIENT MESSAGE (OUTPATIENT)
Dept: RHEUMATOLOGY | Facility: CLINIC | Age: 47
End: 2022-11-02
Payer: COMMERCIAL

## 2022-12-21 ENCOUNTER — PATIENT MESSAGE (OUTPATIENT)
Dept: RHEUMATOLOGY | Facility: CLINIC | Age: 47
End: 2022-12-21
Payer: COMMERCIAL

## 2023-01-22 ENCOUNTER — PATIENT MESSAGE (OUTPATIENT)
Dept: RHEUMATOLOGY | Facility: CLINIC | Age: 48
End: 2023-01-22
Payer: COMMERCIAL

## 2023-02-15 ENCOUNTER — OFFICE VISIT (OUTPATIENT)
Dept: RHEUMATOLOGY | Facility: CLINIC | Age: 48
End: 2023-02-15
Payer: COMMERCIAL

## 2023-02-15 VITALS
OXYGEN SATURATION: 93 % | SYSTOLIC BLOOD PRESSURE: 144 MMHG | DIASTOLIC BLOOD PRESSURE: 82 MMHG | RESPIRATION RATE: 20 BRPM | HEART RATE: 88 BPM | BODY MASS INDEX: 36.45 KG/M2 | WEIGHT: 315 LBS | TEMPERATURE: 98 F | HEIGHT: 78 IN

## 2023-02-15 DIAGNOSIS — L40.9 PSORIASIS: ICD-10-CM

## 2023-02-15 DIAGNOSIS — L40.53 PSORIATIC SPONDYLITIS: Primary | ICD-10-CM

## 2023-02-15 PROCEDURE — 99215 OFFICE O/P EST HI 40 MIN: CPT | Mod: S$PBB,,, | Performed by: INTERNAL MEDICINE

## 2023-02-15 PROCEDURE — 3079F DIAST BP 80-89 MM HG: CPT | Mod: ,,, | Performed by: INTERNAL MEDICINE

## 2023-02-15 PROCEDURE — 3077F PR MOST RECENT SYSTOLIC BLOOD PRESSURE >= 140 MM HG: ICD-10-PCS | Mod: ,,, | Performed by: INTERNAL MEDICINE

## 2023-02-15 PROCEDURE — 99215 PR OFFICE/OUTPT VISIT, EST, LEVL V, 40-54 MIN: ICD-10-PCS | Mod: S$PBB,,, | Performed by: INTERNAL MEDICINE

## 2023-02-15 PROCEDURE — 3008F PR BODY MASS INDEX (BMI) DOCUMENTED: ICD-10-PCS | Mod: ,,, | Performed by: INTERNAL MEDICINE

## 2023-02-15 PROCEDURE — 1159F MED LIST DOCD IN RCRD: CPT | Mod: ,,, | Performed by: INTERNAL MEDICINE

## 2023-02-15 PROCEDURE — 3077F SYST BP >= 140 MM HG: CPT | Mod: ,,, | Performed by: INTERNAL MEDICINE

## 2023-02-15 PROCEDURE — 3008F BODY MASS INDEX DOCD: CPT | Mod: ,,, | Performed by: INTERNAL MEDICINE

## 2023-02-15 PROCEDURE — 99214 OFFICE O/P EST MOD 30 MIN: CPT | Mod: PBBFAC | Performed by: INTERNAL MEDICINE

## 2023-02-15 PROCEDURE — 3079F PR MOST RECENT DIASTOLIC BLOOD PRESSURE 80-89 MM HG: ICD-10-PCS | Mod: ,,, | Performed by: INTERNAL MEDICINE

## 2023-02-15 PROCEDURE — 1159F PR MEDICATION LIST DOCUMENTED IN MEDICAL RECORD: ICD-10-PCS | Mod: ,,, | Performed by: INTERNAL MEDICINE

## 2023-02-15 RX ORDER — HYDROCHLOROTHIAZIDE 25 MG/1
TABLET ORAL
COMMUNITY
End: 2023-04-27 | Stop reason: SDUPTHER

## 2023-02-15 RX ORDER — GUSELKUMAB 100 MG/ML
INJECTION SUBCUTANEOUS
Qty: 2 EACH | Refills: 2 | Status: SHIPPED | OUTPATIENT
Start: 2023-02-15 | End: 2023-07-05 | Stop reason: SDUPTHER

## 2023-02-15 NOTE — PROGRESS NOTES
Isabelle Day MD   [facility]  [address]  [facility] [place] [address]  [phone]      PATIENT NAME: Jason Chaudhary  : 1975  DATE: 2/15/23  MRN: [id number]      Billing Provider: Isabelle Day MD  Level of Service:   Patient PCP Information       Provider PCP Type    Spike Johnson MD General            Reason for Visit / Chief Complaint: psoriatic spondylitis (Follow up)           Assessment and Plan (including Health Maintenance)      Problem List  Smart Sets  Document Outside HM   :    Plan:     Chronic psoriasis but newly dx psoriatic spondylitis.   Started bDMARD today.     2/15/2023;   Had a slight response to Humira but skin patch over left lower extremity is still pruritic and not healed. Did not notice a significant response to joint pain with Humira.   Willing to switch biologic routes. Can try Tremfya. Had stopped Humira last month already.          ICD-10-CM ICD-9-CM    1. Psoriatic spondylitis  L40.53 696.0 guselkumab (TREMFYA) 100 mg/mL AtIn     720.81       2. Psoriasis  L40.9 696.1 guselkumab (TREMFYA) 100 mg/mL AtIn                     History of Present Illness / Problem Focused Workflow     Jason Chaudhary presents to the clinic with psoriatic spondylitis (Follow up)     Has had psoriasis for atleast a decade. Currently has a patch on the left side of the leg.   Has used primarily topical agents.   Affirms global stiffness affecting neck , shoulder and lower back. Takes Aleve once a week. States he has to stretch his ankles every morning before weight bearing.       Review of Systems     Answers for HPI/ROS submitted by the patient on 2022  fever: No  eye redness: No  mouth sores: No  headaches: No  shortness of breath: No  chest pain: No  trouble swallowing: No  diarrhea: No  constipation: No  unexpected weight change: No  genital sore: No  During the last 3 days, have you had a skin rash?:  No  Bruises or bleeds easily: No  cough: No        Medical / Social / Family History     Past Medical History:   Diagnosis Date    Allergy     Hypertension        Past Surgical History:   Procedure Laterality Date    RECONSTRUCTION OF CHEST WALL Bilateral     WRIST SURGERY Left     cyst removal       Social History  Mr. Jason Chaudhary  reports that he has never smoked. He has never used smokeless tobacco. He reports that he does not drink alcohol and does not use drugs.    Family History  Mr.'s Jason Chaudhary family history includes Diabetes in his father; Hypertension in his father and mother.    Medications and Allergies     Medications  Outpatient Medications Marked as Taking for the 2/15/23 encounter (Office Visit) with Isabelle Day MD   Medication Sig Dispense Refill    fexofenadine (ALLEGRA) 180 MG tablet Take 180 mg by mouth once daily.      hydroCHLOROthiazide (HYDRODIURIL) 25 MG tablet Take 1 tablet (25 mg total) by mouth once daily. For BLOOD PRESSURE 90 tablet 1    meloxicam (MOBIC) 15 MG tablet Take 1 tablet (15 mg total) by mouth once daily. For PAIN and INFLAMMATION 90 tablet 1    multivit-min/ferrous fumarate (MULTI VITAMIN ORAL) Take by mouth.      potassium chloride (MICRO-K) 10 MEQ CpSR Take 1 capsule (10 mEq total) by mouth once daily. 90 capsule 1       Allergies  Review of patient's allergies indicates:   Allergen Reactions    Pcn [penicillins]     Penicillin g potassium Rash       Physical Examination     Vitals:    02/15/23 0821   BP: (!) 144/82   Pulse: 88   Resp: 20   Temp: 98 °F (36.7 °C)     Physical Exam  Constitutional:       Appearance: He is obese.   Musculoskeletal:         General: Tenderness present.      Comments: Scar on left wrist from prior ganglion cyst removal.               Signature:  Isabelle Day MD  [facility]  [address] [facility] [address]  [phone]    Date of encounter:  2/15/23

## 2023-02-16 ENCOUNTER — SPECIALTY PHARMACY (OUTPATIENT)
Dept: PHARMACY | Facility: CLINIC | Age: 48
End: 2023-02-16

## 2023-02-16 DIAGNOSIS — L40.50 PSORIATIC ARTHRITIS: Primary | ICD-10-CM

## 2023-02-16 NOTE — TELEPHONE ENCOUNTER
Lucian, this is Isidra Tabor with [facility].  We are working on your prescription that your doctor has sent us. We will be working with your insurance to get this approved for you. We will be calling you along the way with updates on your medication.  If you have any questions, you can reach us at [phone].    Welcome call outcome: No answer/Unable to leave voicemail

## 2023-02-17 NOTE — TELEPHONE ENCOUNTER
Secure chat message with Aviva ALEJANDRO regarding whether to submit PA under PsA or PsO. She said to submit under PsO and Dr Allen said the pt has T/F conventional therapy for   PsO.    PA submitted for Tremfya via [facility]  (Key: [id number]).

## 2023-02-22 ENCOUNTER — PATIENT MESSAGE (OUTPATIENT)
Dept: PHARMACY | Facility: CLINIC | Age: 48
End: 2023-02-22

## 2023-02-22 NOTE — TELEPHONE ENCOUNTER
PA approved for Tremfya from 1/18/23-2/17/24.    Test claims says pt has 00 fills left at retail. Pt must fill at [facility]. Pt has [employer].      Routing rx to [facility], [phone], and closing out of [facility] services.     Outgoing call to pt regarding Tremfya approval, copay card enrollment and rx sent to [facility]. No answer, LVM.  Sent [facility] msg.

## 2023-04-27 ENCOUNTER — OFFICE VISIT (OUTPATIENT)
Dept: FAMILY MEDICINE | Facility: CLINIC | Age: 48
End: 2023-04-27
Payer: COMMERCIAL

## 2023-04-27 VITALS
TEMPERATURE: 98 F | RESPIRATION RATE: 20 BRPM | SYSTOLIC BLOOD PRESSURE: 137 MMHG | BODY MASS INDEX: 36.45 KG/M2 | DIASTOLIC BLOOD PRESSURE: 86 MMHG | HEIGHT: 78 IN | OXYGEN SATURATION: 96 % | WEIGHT: 315 LBS | HEART RATE: 70 BPM

## 2023-04-27 DIAGNOSIS — Z12.11 ENCOUNTER FOR SCREENING FOR MALIGNANT NEOPLASM OF COLON: Primary | ICD-10-CM

## 2023-04-27 DIAGNOSIS — I10 ESSENTIAL HYPERTENSION: Chronic | ICD-10-CM

## 2023-04-27 DIAGNOSIS — Z12.11 COLON CANCER SCREENING: ICD-10-CM

## 2023-04-27 DIAGNOSIS — Z13.1 SCREENING FOR DIABETES MELLITUS: ICD-10-CM

## 2023-04-27 DIAGNOSIS — Z23 NEED FOR DIPHTHERIA, TETANUS, PERTUSSIS, AND HIB VACCINATION: ICD-10-CM

## 2023-04-27 DIAGNOSIS — Z12.11 SCREENING FOR COLON CANCER: Primary | ICD-10-CM

## 2023-04-27 DIAGNOSIS — L40.9 PSORIASIS: Chronic | ICD-10-CM

## 2023-04-27 DIAGNOSIS — Z13.220 SCREENING FOR LIPOID DISORDERS: ICD-10-CM

## 2023-04-27 DIAGNOSIS — M15.9 OSTEOARTHRITIS OF MULTIPLE JOINTS, UNSPECIFIED OSTEOARTHRITIS TYPE: Chronic | ICD-10-CM

## 2023-04-27 LAB
CHOLEST SERPL-MCNC: 129 MG/DL (ref 0–200)
CHOLEST/HDLC SERPL: 4.3 {RATIO}
GLUCOSE SERPL-MCNC: 115 MG/DL (ref 74–106)
HDLC SERPL-MCNC: 30 MG/DL (ref 40–60)
LDLC SERPL CALC-MCNC: 80 MG/DL
LDLC/HDLC SERPL: 2.7 {RATIO}
NONHDLC SERPL-MCNC: 99 MG/DL
TRIGL SERPL-MCNC: 95 MG/DL (ref 35–150)
VLDLC SERPL-MCNC: 19 MG/DL

## 2023-04-27 PROCEDURE — 1159F MED LIST DOCD IN RCRD: CPT | Mod: ,,, | Performed by: FAMILY MEDICINE

## 2023-04-27 PROCEDURE — 80061 LIPID PANEL: ICD-10-PCS | Mod: ,,, | Performed by: CLINICAL MEDICAL LABORATORY

## 2023-04-27 PROCEDURE — 3079F PR MOST RECENT DIASTOLIC BLOOD PRESSURE 80-89 MM HG: ICD-10-PCS | Mod: ,,, | Performed by: FAMILY MEDICINE

## 2023-04-27 PROCEDURE — 82947 GLUCOSE, RANDOM: ICD-10-PCS | Mod: ,,, | Performed by: CLINICAL MEDICAL LABORATORY

## 2023-04-27 PROCEDURE — 1160F RVW MEDS BY RX/DR IN RCRD: CPT | Mod: ,,, | Performed by: FAMILY MEDICINE

## 2023-04-27 PROCEDURE — 3079F DIAST BP 80-89 MM HG: CPT | Mod: ,,, | Performed by: FAMILY MEDICINE

## 2023-04-27 PROCEDURE — 99396 PREV VISIT EST AGE 40-64: CPT | Mod: ,,, | Performed by: FAMILY MEDICINE

## 2023-04-27 PROCEDURE — 1160F PR REVIEW ALL MEDS BY PRESCRIBER/CLIN PHARMACIST DOCUMENTED: ICD-10-PCS | Mod: ,,, | Performed by: FAMILY MEDICINE

## 2023-04-27 PROCEDURE — 99396 PR PREVENTIVE VISIT,EST,40-64: ICD-10-PCS | Mod: ,,, | Performed by: FAMILY MEDICINE

## 2023-04-27 PROCEDURE — 80061 LIPID PANEL: CPT | Mod: ,,, | Performed by: CLINICAL MEDICAL LABORATORY

## 2023-04-27 PROCEDURE — 3008F PR BODY MASS INDEX (BMI) DOCUMENTED: ICD-10-PCS | Mod: ,,, | Performed by: FAMILY MEDICINE

## 2023-04-27 PROCEDURE — 3008F BODY MASS INDEX DOCD: CPT | Mod: ,,, | Performed by: FAMILY MEDICINE

## 2023-04-27 PROCEDURE — 82947 ASSAY GLUCOSE BLOOD QUANT: CPT | Mod: ,,, | Performed by: CLINICAL MEDICAL LABORATORY

## 2023-04-27 PROCEDURE — 3075F SYST BP GE 130 - 139MM HG: CPT | Mod: ,,, | Performed by: FAMILY MEDICINE

## 2023-04-27 PROCEDURE — 1159F PR MEDICATION LIST DOCUMENTED IN MEDICAL RECORD: ICD-10-PCS | Mod: ,,, | Performed by: FAMILY MEDICINE

## 2023-04-27 PROCEDURE — 3075F PR MOST RECENT SYSTOLIC BLOOD PRESS GE 130-139MM HG: ICD-10-PCS | Mod: ,,, | Performed by: FAMILY MEDICINE

## 2023-04-27 RX ORDER — NAPROXEN SODIUM 220 MG
TABLET ORAL
COMMUNITY
End: 2024-02-27

## 2023-04-27 RX ORDER — MULTIVIT-MIN/IRON/FOLIC ACID/K 18-600-40
CAPSULE ORAL
COMMUNITY

## 2023-04-27 RX ORDER — MINERAL OIL
ENEMA (ML) RECTAL
COMMUNITY
End: 2023-04-27 | Stop reason: SDUPTHER

## 2023-04-27 RX ORDER — HYDROCHLOROTHIAZIDE 25 MG/1
25 TABLET ORAL DAILY
Qty: 90 TABLET | Refills: 1 | Status: SHIPPED | OUTPATIENT
Start: 2023-04-27 | End: 2023-08-15

## 2023-04-27 RX ORDER — MELOXICAM 15 MG/1
15 TABLET ORAL DAILY
Qty: 90 TABLET | Refills: 1 | Status: SHIPPED | OUTPATIENT
Start: 2023-04-27 | End: 2023-08-15

## 2023-04-27 NOTE — PROGRESS NOTES
Subjective     Patient ID: Jason Chaudhary is a 47 y.o. male.    Chief Complaint: Healthy You (Here today for healthy you. ), Immunizations (Discussed with patient, he thinks he has had a tetanus in the past 10 years. ), and Colonoscopy (Discussed with patient, he is in agreement to be scheduled for a colonoscopy. )    HPI  Review of Systems   Constitutional:  Negative for activity change, appetite change, chills, fatigue and fever.   HENT:  Negative for nasal congestion, ear pain, hearing loss, postnasal drip and sore throat.    Respiratory:  Negative for cough, chest tightness, shortness of breath and wheezing.    Cardiovascular:  Negative for chest pain, palpitations, leg swelling and claudication.   Gastrointestinal:  Negative for abdominal pain, change in bowel habit, constipation, diarrhea, nausea, vomiting and change in bowel habit.   Genitourinary:  Negative for dysuria.   Musculoskeletal:  Negative for arthralgias, back pain, gait problem and myalgias.   Integumentary:  Negative for rash.   Neurological:  Negative for weakness and headaches.   Psychiatric/Behavioral:  Negative for suicidal ideas. The patient is not nervous/anxious.      Tobacco Use: Low Risk     Smoking Tobacco Use: Never    Smokeless Tobacco Use: Never    Passive Exposure: Never     Review of patient's allergies indicates:   Allergen Reactions    Pcn [penicillins]     Penicillin g potassium Rash and Nausea And Vomiting     Current Outpatient Medications   Medication Instructions    fexofenadine (ALLEGRA) 180 mg, Oral, Daily    guselkumab (TREMFYA) 100 mg/mL AtIn 100mg SC x1 on wk 0, 4 then q8wks    hydroCHLOROthiazide (HYDRODIURIL) 25 mg, Oral, Daily, For BLOOD PRESSURE    meloxicam (MOBIC) 15 mg, Oral, Daily, For PAIN and INFLAMMATION    multivit-min-folic acid-vit K (MULTI FOR HIM, NO IRON,) 400-40 mcg Cap as directed Orally    naproxen sodium (ANAPROX) 220 MG tablet 1 tablet with food or milk as needed Orally every 12 hrs  "    Medications Discontinued During This Encounter   Medication Reason    varicella-zoster gE vac,2 of 2 50 mcg SusR Patient no longer taking    potassium chloride (MICRO-K) 10 MEQ CpSR Patient no longer taking    multivit-min/ferrous fumarate (MULTI VITAMIN ORAL) Duplicate Order    hydroCHLOROthiazide (HYDRODIURIL) 25 MG tablet Duplicate Order    fexofenadine (ALLEGRA) 180 MG tablet Duplicate Order    adalimumab (HUMIRA) 40 mg/0.8 mL SyKt injection Patient no longer taking    hydroCHLOROthiazide (HYDRODIURIL) 25 MG tablet Reorder    meloxicam (MOBIC) 15 MG tablet Reorder       Past Medical History:   Diagnosis Date    Allergy     Hypertension      Health Maintenance Topics with due status: Not Due       Topic Last Completion Date    Lipid Panel 04/26/2022     Immunization History   Administered Date(s) Administered    COVID-19, MRNA, LN-S, PF (Pfizer) (Purple Cap) 03/18/2021, 04/08/2021    Influenza - Quadrivalent - PF *Preferred* (6 months and older) 11/24/2021, 11/01/2022       Objective     Body mass index is 40.35 kg/m².  Wt Readings from Last 3 Encounters:   04/27/23 (!) 158.4 kg (349 lb 3.2 oz)   02/15/23 (!) 164 kg (361 lb 9.6 oz)   08/25/22 (!) 161.5 kg (356 lb)     Ht Readings from Last 3 Encounters:   04/27/23 6' 6" (1.981 m)   02/15/23 6' 6" (1.981 m)   08/25/22 6' 6" (1.981 m)     BP Readings from Last 3 Encounters:   04/27/23 137/86   02/15/23 (!) 144/82   08/25/22 (!) 162/86     Temp Readings from Last 3 Encounters:   04/27/23 97.5 °F (36.4 °C) (Oral)   02/15/23 98 °F (36.7 °C) (Oral)   08/23/22 98 °F (36.7 °C) (Oral)     Pulse Readings from Last 3 Encounters:   04/27/23 70   02/15/23 88   08/25/22 86     Resp Readings from Last 3 Encounters:   04/27/23 20   02/15/23 20   08/25/22 16     PF Readings from Last 3 Encounters:   No data found for PF       Physical Exam  Vitals and nursing note reviewed.   Constitutional:       General: He is not in acute distress.     Appearance: Normal appearance. He is " not ill-appearing.   Eyes:      Extraocular Movements: Extraocular movements intact.      Pupils: Pupils are equal, round, and reactive to light.   Cardiovascular:      Rate and Rhythm: Normal rate and regular rhythm.      Heart sounds: Normal heart sounds.   Pulmonary:      Effort: Pulmonary effort is normal.      Breath sounds: Normal breath sounds.   Abdominal:      General: Bowel sounds are normal.      Palpations: Abdomen is soft.   Musculoskeletal:         General: Normal range of motion.   Skin:     Findings: No rash.   Neurological:      General: No focal deficit present.      Mental Status: He is alert and oriented to person, place, and time. Mental status is at baseline.   Psychiatric:         Mood and Affect: Mood normal.         Behavior: Behavior normal.       Assessment and Plan     Problem List Items Addressed This Visit          Derm    Psoriasis (Chronic)       Cardiac/Vascular    Essential hypertension (Chronic)     Blood pressure is fairly well controlled, no change in treatment            Relevant Medications    hydroCHLOROthiazide (HYDRODIURIL) 25 MG tablet       Orthopedic    Osteoarthritis of multiple joints (Chronic)    Relevant Medications    meloxicam (MOBIC) 15 MG tablet     Other Visit Diagnoses       Encounter for screening for malignant neoplasm of colon    -  Primary    Relevant Orders    Ambulatory referral/consult to Gastroenterology    Colon cancer screening        Need for diphtheria, tetanus, pertussis, and Hib vaccination        Screening for diabetes mellitus        Relevant Orders    Glucose, Random    Screening for lipoid disorders        Relevant Orders    Lipid Panel            Plan:       I have reviewed the medications, allergies, and problem list.     Goal Actions:    What type of visit is the patient here for today?: Healthy You  Does the patient consent to enroll in Color Me Healthy?: Yes  Is this a Wellness Follow Up?: No  What is your overall wellness goal? (select at  least one): Lifestyle modifications, Lose weight, Understand disease process, Improve overall health  Choose 3: Biometric, Lifestyle, Nutrition, Exercise  Biometric Actions: Attend regularly scheduled office visits  Lifestyle Actions : Take medications as prescribed  Nurtrition Actions: Avoid adding table salt, drink 8-10 glasses of water per day  Exercise Actions: Recommend physical activity 30 minutes per day 3-5 times/week

## 2023-04-28 ENCOUNTER — PATIENT OUTREACH (OUTPATIENT)
Dept: ADMINISTRATIVE | Facility: HOSPITAL | Age: 48
End: 2023-04-28

## 2023-04-28 NOTE — PROGRESS NOTES
Post visit Population Health review of encounter with date of service  4/27 with Johnson.  All required HY components in encounter.    .Added myself to careteam        .  Health Maintenance Due   Topic Date Due    TETANUS VACCINE  Never done    Hemoglobin A1c (Diabetic Prevention Screening)  Never done    COVID-19 Vaccine (3 - Booster for Pfizer series) 06/03/2021    Colorectal Cancer Screening  Never done

## 2023-06-02 ENCOUNTER — PATIENT OUTREACH (OUTPATIENT)
Dept: ADMINISTRATIVE | Facility: HOSPITAL | Age: 48
End: 2023-06-02

## 2023-06-02 NOTE — PROGRESS NOTES
Per [facility] website, insurance is active and patient is enrolled in [facility]:  [facility] for htn-2visits  4/28/23-6/26/24  Changed next visit to [facility]

## 2023-07-05 ENCOUNTER — OFFICE VISIT (OUTPATIENT)
Dept: RHEUMATOLOGY | Facility: CLINIC | Age: 48
End: 2023-07-05
Payer: COMMERCIAL

## 2023-07-05 VITALS
BODY MASS INDEX: 36.45 KG/M2 | RESPIRATION RATE: 16 BRPM | HEIGHT: 78 IN | DIASTOLIC BLOOD PRESSURE: 80 MMHG | WEIGHT: 315 LBS | HEART RATE: 81 BPM | OXYGEN SATURATION: 96 % | SYSTOLIC BLOOD PRESSURE: 160 MMHG

## 2023-07-05 DIAGNOSIS — L40.53 PSORIATIC SPONDYLITIS: ICD-10-CM

## 2023-07-05 DIAGNOSIS — Z79.899 HIGH RISK MEDICATION USE: Primary | ICD-10-CM

## 2023-07-05 DIAGNOSIS — L40.9 PSORIASIS: ICD-10-CM

## 2023-07-05 PROCEDURE — 3077F PR MOST RECENT SYSTOLIC BLOOD PRESSURE >= 140 MM HG: ICD-10-PCS | Mod: ,,, | Performed by: INTERNAL MEDICINE

## 2023-07-05 PROCEDURE — 3077F SYST BP >= 140 MM HG: CPT | Mod: ,,, | Performed by: INTERNAL MEDICINE

## 2023-07-05 PROCEDURE — 99215 PR OFFICE/OUTPT VISIT, EST, LEVL V, 40-54 MIN: ICD-10-PCS | Mod: S$PBB,,, | Performed by: INTERNAL MEDICINE

## 2023-07-05 PROCEDURE — 1159F PR MEDICATION LIST DOCUMENTED IN MEDICAL RECORD: ICD-10-PCS | Mod: ,,, | Performed by: INTERNAL MEDICINE

## 2023-07-05 PROCEDURE — 3008F PR BODY MASS INDEX (BMI) DOCUMENTED: ICD-10-PCS | Mod: ,,, | Performed by: INTERNAL MEDICINE

## 2023-07-05 PROCEDURE — 99215 OFFICE O/P EST HI 40 MIN: CPT | Mod: S$PBB,,, | Performed by: INTERNAL MEDICINE

## 2023-07-05 PROCEDURE — 3008F BODY MASS INDEX DOCD: CPT | Mod: ,,, | Performed by: INTERNAL MEDICINE

## 2023-07-05 PROCEDURE — 1159F MED LIST DOCD IN RCRD: CPT | Mod: ,,, | Performed by: INTERNAL MEDICINE

## 2023-07-05 PROCEDURE — 99214 OFFICE O/P EST MOD 30 MIN: CPT | Mod: PBBFAC | Performed by: INTERNAL MEDICINE

## 2023-07-05 PROCEDURE — 3079F PR MOST RECENT DIASTOLIC BLOOD PRESSURE 80-89 MM HG: ICD-10-PCS | Mod: ,,, | Performed by: INTERNAL MEDICINE

## 2023-07-05 PROCEDURE — 3079F DIAST BP 80-89 MM HG: CPT | Mod: ,,, | Performed by: INTERNAL MEDICINE

## 2023-07-05 RX ORDER — GUSELKUMAB 100 MG/ML
INJECTION SUBCUTANEOUS
Qty: 2 EACH | Refills: 2 | Status: SHIPPED | OUTPATIENT
Start: 2023-07-05 | End: 2024-02-27

## 2023-07-05 NOTE — PROGRESS NOTES
Isabelle Day MD   [facility]  [address] [facility]  [place] [place] [facility] [place] [address]  [phone]      PATIENT NAME: Jason Chaudhary  : 1975  DATE: 23  MRN: [id number]      Billing Provider: Isabelle Day MD  Level of Service:   Patient PCP Information       Provider PCP Type    Spike Johnson MD General            Reason for Visit / Chief Complaint: Follow-up (Follow up psoriatic spondylitis/Pt states he has been doing well rash is better)           Assessment and Plan (including Health Maintenance)      Problem List  Smart Sets  Document Outside HM   :    Plan:     Chronic psoriasis but newly dx psoriatic spondylitis.   Started bDMARD today.     2/15/2023;   Had a slight response to Humira but skin patch over left lower extremity is still pruritic and not healed. Did not notice a significant response to joint pain with Humira.   Willing to switch biologic routes. Can try Tremfya. Had stopped Humira last month already.        2023:  Started Tremfya. S/p 3 shots [ 2 loading and one maintenance]   The psoriasis on the left lower extremity is improving. Not as itchy anymore.   Would recommend starting PT for low back discomfort. Denies paresthesia or lower extremity sharp shooting pains.   Plan is to continue with Tremfya.   FUP with edmundo mayorga in 4 months . Labs prior to her appt.       ICD-10-CM ICD-9-CM    1. Psoriatic spondylitis  L40.53 696.0      720.81       2. Psoriasis  L40.9 696.1                      History of Present Illness / Problem Focused Workflow     Jason Chaudhary presents to the clinic with Follow-up (Follow up psoriatic spondylitis/Pt states he has been doing well rash is better)     Has had psoriasis for atleast a decade. Currently has a patch on the left side of the leg.   Has used primarily topical agents.   Affirms global stiffness affecting neck , shoulder and lower back. Takes Aleve once a week. States he  has to stretch his ankles every morning before weight bearing.       Review of Systems     Answers for HPI/ROS submitted by the patient on 8/23/2022  fever: No  eye redness: No  mouth sores: No  headaches: No  shortness of breath: No  chest pain: No  trouble swallowing: No  diarrhea: No  constipation: No  unexpected weight change: No  genital sore: No  During the last 3 days, have you had a skin rash?: No  Bruises or bleeds easily: No  cough: No        Medical / Social / Family History     Past Medical History:   Diagnosis Date    Allergy     Hypertension        Past Surgical History:   Procedure Laterality Date    RECONSTRUCTION OF CHEST WALL Bilateral     WRIST SURGERY Left     cyst removal       Social History  Mr. Jason Chaudhary  reports that he has never smoked. He has never been exposed to tobacco smoke. He has never used smokeless tobacco. He reports that he does not drink alcohol and does not use drugs.    Family History  Mr.'s Jason Chaudhary family history includes Diabetes in his father; Hypertension in his father and mother.    Medications and Allergies     Medications  Outpatient Medications Marked as Taking for the 7/5/23 encounter (Office Visit) with Isabelle Day MD   Medication Sig Dispense Refill    fexofenadine (ALLEGRA) 180 MG tablet Take 180 mg by mouth once daily.      guselkumab (TREMFYA) 100 mg/mL AtIn 100mg SC x1 on wk 0, 4 then q8wks 2 each 2    hydroCHLOROthiazide (HYDRODIURIL) 25 MG tablet Take 1 tablet (25 mg total) by mouth once daily. For BLOOD PRESSURE 90 tablet 1    meloxicam (MOBIC) 15 MG tablet Take 1 tablet (15 mg total) by mouth once daily. For PAIN and INFLAMMATION 90 tablet 1    multivit-min-folic acid-vit K (MULTI FOR HIM, NO IRON,) 400-40 mcg Cap as directed Orally      naproxen sodium (ANAPROX) 220 MG tablet 1 tablet with food or milk as needed Orally every 12 hrs         Allergies  Review of patient's allergies indicates:   Allergen Reactions    Pcn  [penicillins]     Penicillin g potassium Rash and Nausea And Vomiting       Physical Examination     Vitals:    07/05/23 1314   BP: (!) 160/80   Pulse: 81   Resp: 16     Physical Exam  Constitutional:       Appearance: He is obese.   Musculoskeletal:         General: Tenderness present.      Comments: Scar on left wrist from prior ganglion cyst removal.               Signature:  Isabelle Day MD  [facility]  [address] [facility] [address]  [phone]    Date of encounter: 7/5/23

## 2023-07-18 ENCOUNTER — CLINICAL SUPPORT (OUTPATIENT)
Dept: REHABILITATION | Facility: HOSPITAL | Age: 48
End: 2023-07-18
Payer: COMMERCIAL

## 2023-07-18 DIAGNOSIS — L40.53 PSORIATIC SPONDYLITIS: ICD-10-CM

## 2023-07-18 PROCEDURE — 97162 PT EVAL MOD COMPLEX 30 MIN: CPT

## 2023-07-18 NOTE — PLAN OF CARE
[facility]   Physical Therapy Initial Evaluation    Name: Jason Chaudhary  Clinic Number: [id number]    Therapy Diagnosis:   Encounter Diagnosis   Name Primary?    Psoriatic spondylitis      Physician: Isabelle Day MD    Physician Orders: PT Eval and Treat   Medical Diagnosis from Referral: L40.53 (ICD-10-CM) - Psoriatic spondylitis   Evaluation Date: 7/18/2023  Authorization Period Expiration: 50 visits PT,OT, ST combined  Plan of Care Expiration: Evaluation Only  Visit # / Visits authorized: 1/ 1    Time In: 2:45 PM  Time Out: 3:10 PM  Total Appointment Time (timed & untimed codes): 25 minutes    Precautions: Standard    Subjective   Date of onset: Chronic  History of current condition -  reports: mild pain in his lower back that only seems to bother him in his sleep. He notes pain as 5/10 but notes that the pain wakes him up at night. He is unable to recall a particular position or movement that exacerbates his pain.     Medical History:   Past Medical History:   Diagnosis Date    Allergy     Hypertension        Surgical History:   Jason Chaudhary  has a past surgical history that includes Reconstruction of chest wall (Bilateral) and Wrist surgery (Left).    Medications:    has a current medication list which includes the following prescription(s): fexofenadine, tremfya, hydrochlorothiazide, meloxicam, multi for him (no iron), and naproxen sodium.    Allergies:   Review of patient's allergies indicates:   Allergen Reactions    Pcn [penicillins]     Penicillin g potassium Rash and Nausea And Vomiting        Imaging, none:     Prior Therapy: Therapy  Social History:  lives with their family  Occupation: IT at [employer]  Prior Level of Function: Independent  Current Level of Function:     Pain:  Current 0/10, worst 5/10, best 0/10   Location: lower back   Description: Aching  Aggravating Factors: lying on back  Easing Factors: rest    Pts goals: to decrease painful  symptoms    Objective     Posture:  Standing lordosis: WNL  Sitting lordosis: WNL  Iliac crest height:  equal  PSIS height:  equal  Pelvic rotation/torsion: No  Scoliosis: No  Lateral shift:   Comments:    All lumbar range of motion WFL    MANUAL MUSCLE TEST  Right left   Hip flexion MMT number: 4+/5 MMT strength: 4+/5   Hip abduction MMT strength: 4+/5 MMT strength: 4+/5   Knee extension MMT strength: 5/5 MMT strength: 5/5   Knee flexion  MMT strength: 4/5 MMT strength: 4/5   Ankle dorsiflexion MMT strength: 4/5 MMT strength: 4/5   Ankle plantar flexion  MMT strength: 4+/5 MMT strength: 4+/5   Extensor hallucis longus MMT strength: 5/5 MMT strength: 5/5     ROM/flexibility right left   Hip flexion (120) 105 105   Internal rotation (45) 30 30   External rotation (45) 45 45   Hamstring 90/90 (-10) 2 2   Rectus femoris (120) 90 90   Other     Other       Special test Right  Left    SLR test < 60 degrees Negative Negative   SLR test > 60 degrees Negative Negative   Sitting slump test Negative Negative   Piriformis test Negative Negative   DUANE test Negative Negative   SI forward bend Negative Negative   SI distraction Negative Negative   SI compression Negative Negative       Limitation/Restriction for FOTO  Survey    Therapist reviewed FOTO scores for Jason Chaudhary on 7/18/2023.   FOTO documents entered into [employer] - see Media section.    Limitation Score: 61%         TREATMENT   Treatment Time In: 3:00 PM  Treatment Time Out: 3:10 PM  Total Treatment time (time-based codes) separate from Evaluation: 10 minutes     received the treatments listed below:  THERAPEUTIC EXERCISES to develop strength, ROM, and flexibility for 10 minutes including HEP instruction and demonstration    Home Exercises and Patient Education Provided    Education provided:   - on correct performance of therapeutic interventions     Written Home Exercises Provided: yes.  Exercises were reviewed and  was able to demonstrate them  prior to the end of the session.   demonstrated good  understanding of the education provided.     See EMR under Patient Instructions for exercises provided 7/18/2023.    Assessment    is a 47 y.o. male referred to outpatient Physical Therapy with a medical diagnosis of lower back pain. Pt presents with limited pain free range of motion, and mildly reduced core strength. Patient will be given comprehensive HEP to address his symptoms.    Plan   Evaluation Only    Tobi Levine, PT

## 2023-08-08 ENCOUNTER — OFFICE VISIT (OUTPATIENT)
Dept: FAMILY MEDICINE | Facility: CLINIC | Age: 48
End: 2023-08-08
Payer: COMMERCIAL

## 2023-08-08 VITALS
SYSTOLIC BLOOD PRESSURE: 100 MMHG | TEMPERATURE: 98 F | WEIGHT: 315 LBS | HEIGHT: 78 IN | DIASTOLIC BLOOD PRESSURE: 80 MMHG | RESPIRATION RATE: 20 BRPM | OXYGEN SATURATION: 95 % | BODY MASS INDEX: 36.45 KG/M2 | HEART RATE: 144 BPM

## 2023-08-08 DIAGNOSIS — I48.92 ATRIAL FLUTTER BY ELECTROCARDIOGRAM: Primary | ICD-10-CM

## 2023-08-08 DIAGNOSIS — R00.0 TACHYCARDIA: ICD-10-CM

## 2023-08-08 LAB
EKG 12-LEAD: ABNORMAL
PR INTERVAL: ABNORMAL
PRT AXES: ABNORMAL
QRS DURATION: ABNORMAL
QT/QTC: ABNORMAL
VENTRICULAR RATE: ABNORMAL

## 2023-08-08 PROCEDURE — 3079F PR MOST RECENT DIASTOLIC BLOOD PRESSURE 80-89 MM HG: ICD-10-PCS | Mod: ,,, | Performed by: FAMILY MEDICINE

## 2023-08-08 PROCEDURE — 3074F SYST BP LT 130 MM HG: CPT | Mod: ,,, | Performed by: FAMILY MEDICINE

## 2023-08-08 PROCEDURE — 93000 ELECTROCARDIOGRAM COMPLETE: CPT | Mod: ,,, | Performed by: FAMILY MEDICINE

## 2023-08-08 PROCEDURE — 3074F PR MOST RECENT SYSTOLIC BLOOD PRESSURE < 130 MM HG: ICD-10-PCS | Mod: ,,, | Performed by: FAMILY MEDICINE

## 2023-08-08 PROCEDURE — 1160F RVW MEDS BY RX/DR IN RCRD: CPT | Mod: ,,, | Performed by: FAMILY MEDICINE

## 2023-08-08 PROCEDURE — 3008F BODY MASS INDEX DOCD: CPT | Mod: ,,, | Performed by: FAMILY MEDICINE

## 2023-08-08 PROCEDURE — 3008F PR BODY MASS INDEX (BMI) DOCUMENTED: ICD-10-PCS | Mod: ,,, | Performed by: FAMILY MEDICINE

## 2023-08-08 PROCEDURE — 1160F PR REVIEW ALL MEDS BY PRESCRIBER/CLIN PHARMACIST DOCUMENTED: ICD-10-PCS | Mod: ,,, | Performed by: FAMILY MEDICINE

## 2023-08-08 PROCEDURE — 99214 PR OFFICE/OUTPT VISIT, EST, LEVL IV, 30-39 MIN: ICD-10-PCS | Mod: ,,, | Performed by: FAMILY MEDICINE

## 2023-08-08 PROCEDURE — 99214 OFFICE O/P EST MOD 30 MIN: CPT | Mod: ,,, | Performed by: FAMILY MEDICINE

## 2023-08-08 PROCEDURE — 93000 POCT EKG 12-LEAD: ICD-10-PCS | Mod: ,,, | Performed by: FAMILY MEDICINE

## 2023-08-08 PROCEDURE — 3079F DIAST BP 80-89 MM HG: CPT | Mod: ,,, | Performed by: FAMILY MEDICINE

## 2023-08-08 PROCEDURE — 1159F PR MEDICATION LIST DOCUMENTED IN MEDICAL RECORD: ICD-10-PCS | Mod: ,,, | Performed by: FAMILY MEDICINE

## 2023-08-08 PROCEDURE — 1159F MED LIST DOCD IN RCRD: CPT | Mod: ,,, | Performed by: FAMILY MEDICINE

## 2023-08-08 NOTE — ASSESSMENT & PLAN NOTE
He will go to the ER at [facility], patient wants to go by private vehicle, does not want to go by ambulance.     We discussed the risk of worsening arrhythmia, but with him having symptoms for around 30 hours, he does not think 5 minutes to drive to the ER is that risky.

## 2023-08-08 NOTE — PROGRESS NOTES
Spike Johnson MD    [address] [place] [address]     PATIENT NAME: Jason Chaudhary  : 1975  DATE: 23  MRN: [id number]      Billing Provider: Spike Johnson MD  Level of Service: PA OFFICE/OUTPT VISIT, EST, LEVL IV, 30-39 MIN  Patient PCP Information       Provider PCP Type    Spike Johnson MD General            Reason for Visit / Chief Complaint: Tachycardia (Symptoms started 2023 about 2AM. States he felt something shift in his body and his heart rate has been elevated since. Denies chest pain, arm pain or any discomfort. )       Update PCP  Update Chief Complaint         History of Present Illness / Problem Focused Workflow     Jason Chaudhary presents to the clinic with Tachycardia (Symptoms started 2023 about 2AM. States he felt something shift in his body and his heart rate has been elevated since. Denies chest pain, arm pain or any discomfort. )     Pulse is over 140 upon arrival to clinic. He states that about 30 hours ago, at 2am, he felt something immediate happen inside his body. He describes it as a shift. From that moment until now he has been consistently having a heart rate around 140. Never had anything like this happen before. He denies shortness of breath or chest pain.     HPI    Review of Systems     Review of Systems   Constitutional:  Negative for activity change, appetite change, chills, fatigue and fever.   HENT:  Negative for nasal congestion, ear pain, hearing loss, postnasal drip and sore throat.    Respiratory:  Negative for cough, chest tightness, shortness of breath and wheezing.    Cardiovascular:  Positive for palpitations. Negative for chest pain, leg swelling and claudication.   Gastrointestinal:  Negative for abdominal pain, change in bowel habit, constipation, diarrhea, nausea, vomiting and change in bowel habit.   Genitourinary:  Negative for dysuria.   Musculoskeletal:   Negative for arthralgias, back pain, gait problem and myalgias.   Integumentary:  Negative for rash.   Neurological:  Negative for weakness and headaches.   Psychiatric/Behavioral:  Negative for suicidal ideas. The patient is not nervous/anxious.         Medical / Social / Family History     Past Medical History:   Diagnosis Date    Allergy     Hypertension        Past Surgical History:   Procedure Laterality Date    RECONSTRUCTION OF CHEST WALL Bilateral     WRIST SURGERY Left     cyst removal       Social History    reports that he has never smoked. He has never been exposed to tobacco smoke. He has never used smokeless tobacco. He reports that he does not drink alcohol and does not use drugs.    Family History  's family history includes Diabetes in his father; Heart failure in his father; Hypertension in his father and mother.    Medications and Allergies     Medications  Outpatient Medications Marked as Taking for the 8/8/23 encounter (Office Visit) with Spike Johnson MD   Medication Sig Dispense Refill    fexofenadine (ALLEGRA) 180 MG tablet Take 180 mg by mouth once daily.      guselkumab (TREMFYA) 100 mg/mL AtIn 100mg SC x1 on wk 0, 4 then q8wks 2 each 2    hydroCHLOROthiazide (HYDRODIURIL) 25 MG tablet Take 1 tablet (25 mg total) by mouth once daily. For BLOOD PRESSURE 90 tablet 1    meloxicam (MOBIC) 15 MG tablet Take 1 tablet (15 mg total) by mouth once daily. For PAIN and INFLAMMATION 90 tablet 1    multivit-min-folic acid-vit K (MULTI FOR HIM, NO IRON,) 400-40 mcg Cap as directed Orally      naproxen sodium (ANAPROX) 220 MG tablet 1 tablet with food or milk as needed Orally every 12 hrs         Allergies  Review of patient's allergies indicates:   Allergen Reactions    Pcn [penicillins]     Penicillin g potassium Rash and Nausea And Vomiting       Physical Examination     Vitals:    08/08/23 0847   BP: 100/80   Pulse: (!) 144   Resp: 20   Temp: 98.3 °F (36.8 °C)     Physical Exam  Vitals  and nursing note reviewed.   Constitutional:       General: He is not in acute distress.     Appearance: Normal appearance. He is not ill-appearing.   Eyes:      Extraocular Movements: Extraocular movements intact.      Pupils: Pupils are equal, round, and reactive to light.   Cardiovascular:      Rate and Rhythm: Regular rhythm. Tachycardia present.      Heart sounds: Normal heart sounds.   Pulmonary:      Effort: Pulmonary effort is normal.      Breath sounds: Normal breath sounds.   Abdominal:      General: Bowel sounds are normal.      Palpations: Abdomen is soft.   Musculoskeletal:         General: Normal range of motion.   Skin:     Findings: No rash.   Neurological:      General: No focal deficit present.      Mental Status: He is alert and oriented to person, place, and time. Mental status is at baseline.   Psychiatric:         Mood and Affect: Mood normal.         Behavior: Behavior normal.            Assessment and Plan (including Health Maintenance)      Problem List  [employer]  Document Outside HM   :    Plan:         Health Maintenance Due   Topic Date Due    TETANUS VACCINE  Never done    Hemoglobin A1c (Diabetic Prevention Screening)  Never done    COVID-19 Vaccine (3 - Pfizer series) 06/03/2021    Colorectal Cancer Screening  Never done       Problem List Items Addressed This Visit          Cardiac/Vascular    Tachycardia    Current Assessment & Plan     He will go to the ER at [facility], patient wants to go by private vehicle, does not want to go by ambulance.     We discussed the risk of worsening arrhythmia, but with him having symptoms for around 30 hours, he does not think 5 minutes to drive to the ER is that risky.          Relevant Orders    POCT EKG 12-LEAD (Manually Resulted by Ordering Provider) (Completed)    Atrial flutter by electrocardiogram - Primary       Health Maintenance Topics with due status: Not Due       Topic Last Completion Date    Influenza Vaccine 11/01/2022    Lipid Panel  04/27/2023       Procedures     Future Appointments   Date Time Provider Department Center   8/23/2023 10:00 AM [facility] GI ROOM 02 [facility]   10/27/2023  1:00 PM Spike Johnson MD [facility]   11/7/2023  1:00 PM Shelly Tracey, P [facility]   5/3/2024 12:45 PM Spike Johnson MD [facility]        Follow up if symptoms worsen or fail to improve.     Signature:  Spike Johnson MD  [address] [place] [address]  Phone #: [phone]  Fax #: [phone]    Date of encounter: 8/8/23    There are no Patient Instructions on file for this visit.

## 2023-08-10 ENCOUNTER — EXTERNAL HOSPITAL ADMISSION (OUTPATIENT)
Dept: ADMINISTRATIVE | Facility: CLINIC | Age: 48
End: 2023-08-10

## 2023-08-10 ENCOUNTER — PATIENT OUTREACH (OUTPATIENT)
Dept: ADMINISTRATIVE | Facility: CLINIC | Age: 48
End: 2023-08-10

## 2023-08-10 DIAGNOSIS — I48.91 ATRIAL FIBRILLATION WITH RVR: ICD-10-CM

## 2023-08-10 DIAGNOSIS — I48.91 A-FIB: Primary | ICD-10-CM

## 2023-08-10 NOTE — PROGRESS NOTES
Rec'd voicemail from patient.    Returned call to verify if the patient needed some assistance or was just returning my call earlier.    Patient did not have any needs just returning my call but I had already spoke with patient's mother.

## 2023-08-10 NOTE — PROGRESS NOTES
C3 nurse spoke with Jason Chaudhary's mother  for a TCC post hospital discharge follow up call. The patient has a scheduled HOSFU appointment with Spike Johnson MD  on 08/15/2023 @ 1015.

## 2023-08-15 ENCOUNTER — OFFICE VISIT (OUTPATIENT)
Dept: FAMILY MEDICINE | Facility: CLINIC | Age: 48
End: 2023-08-15
Payer: COMMERCIAL

## 2023-08-15 ENCOUNTER — HOSPITAL ENCOUNTER (INPATIENT)
Facility: HOSPITAL | Age: 48
LOS: 1 days | Discharge: HOME | DRG: 310 | End: 2023-08-17
Attending: EMERGENCY MEDICINE | Admitting: HOSPITALIST
Payer: COMMERCIAL

## 2023-08-15 ENCOUNTER — HOSPITAL ENCOUNTER (OUTPATIENT)
Dept: CARDIOLOGY | Facility: HOSPITAL | Age: 48
Discharge: HOME | End: 2023-08-15
Attending: INTERNAL MEDICINE
Payer: COMMERCIAL

## 2023-08-15 VITALS
WEIGHT: 315 LBS | SYSTOLIC BLOOD PRESSURE: 121 MMHG | DIASTOLIC BLOOD PRESSURE: 86 MMHG | HEIGHT: 78 IN | TEMPERATURE: 98 F | BODY MASS INDEX: 36.45 KG/M2 | HEART RATE: 96 BPM

## 2023-08-15 DIAGNOSIS — I48.3 TYPICAL ATRIAL FLUTTER: ICD-10-CM

## 2023-08-15 DIAGNOSIS — E87.6 HYPOKALEMIA: ICD-10-CM

## 2023-08-15 DIAGNOSIS — G47.33 OSA (OBSTRUCTIVE SLEEP APNEA): ICD-10-CM

## 2023-08-15 DIAGNOSIS — R00.0 TACHYCARDIA: Primary | ICD-10-CM

## 2023-08-15 DIAGNOSIS — R00.0 TACHYCARDIA: ICD-10-CM

## 2023-08-15 DIAGNOSIS — I10 ESSENTIAL HYPERTENSION: Chronic | ICD-10-CM

## 2023-08-15 DIAGNOSIS — I48.92 ATRIAL FLUTTER: ICD-10-CM

## 2023-08-15 DIAGNOSIS — I48.91 ATRIAL FIBRILLATION WITH RVR: ICD-10-CM

## 2023-08-15 DIAGNOSIS — R07.9 CHEST PAIN: ICD-10-CM

## 2023-08-15 DIAGNOSIS — L40.9 PSORIASIS: Chronic | ICD-10-CM

## 2023-08-15 DIAGNOSIS — I48.91 ATRIAL FIBRILLATION WITH RVR: Primary | Chronic | ICD-10-CM

## 2023-08-15 DIAGNOSIS — I48.3 TYPICAL ATRIAL FLUTTER: Primary | ICD-10-CM

## 2023-08-15 DIAGNOSIS — R07.9 CHEST PAIN, UNSPECIFIED TYPE: ICD-10-CM

## 2023-08-15 LAB
ALBUMIN SERPL BCP-MCNC: 4 G/DL (ref 3.5–5)
ALBUMIN/GLOB SERPL: 1 {RATIO}
ALP SERPL-CCNC: 74 U/L (ref 45–115)
ALT SERPL W P-5'-P-CCNC: 72 U/L (ref 16–61)
ANION GAP SERPL CALCULATED.3IONS-SCNC: 10 MMOL/L (ref 7–16)
APTT PPP: 35.3 SECONDS (ref 25.2–37.3)
AST SERPL W P-5'-P-CCNC: 31 U/L (ref 15–37)
BASOPHILS # BLD AUTO: 0.07 K/UL (ref 0–0.2)
BASOPHILS NFR BLD AUTO: 0.8 % (ref 0–1)
BILIRUB SERPL-MCNC: 0.6 MG/DL (ref ?–1.2)
BUN SERPL-MCNC: 8 MG/DL (ref 7–18)
BUN/CREAT SERPL: 8 (ref 6–20)
CALCIUM SERPL-MCNC: 9.6 MG/DL (ref 8.5–10.1)
CHLORIDE SERPL-SCNC: 110 MMOL/L (ref 98–107)
CO2 SERPL-SCNC: 26 MMOL/L (ref 21–32)
CREAT SERPL-MCNC: 0.98 MG/DL (ref 0.7–1.3)
DIFFERENTIAL METHOD BLD: ABNORMAL
EGFR (NO RACE VARIABLE) (RUSH/TITUS): 97 ML/MIN/1.73M2
EKG 12-LEAD: ABNORMAL
EOSINOPHIL # BLD AUTO: 0.18 K/UL (ref 0–0.5)
EOSINOPHIL NFR BLD AUTO: 2 % (ref 1–4)
ERYTHROCYTE [DISTWIDTH] IN BLOOD BY AUTOMATED COUNT: 13.9 % (ref 11.5–14.5)
GLOBULIN SER-MCNC: 3.9 G/DL (ref 2–4)
GLUCOSE SERPL-MCNC: 98 MG/DL (ref 74–106)
HCT VFR BLD AUTO: 44.8 % (ref 40–54)
HGB BLD-MCNC: 15.1 G/DL (ref 13.5–18)
IMM GRANULOCYTES # BLD AUTO: 0.03 K/UL (ref 0–0.04)
IMM GRANULOCYTES NFR BLD: 0.3 % (ref 0–0.4)
INR BLD: 1.17
LYMPHOCYTES # BLD AUTO: 1.85 K/UL (ref 1–4.8)
LYMPHOCYTES NFR BLD AUTO: 20.8 % (ref 27–41)
MAGNESIUM SERPL-MCNC: 2.6 MG/DL (ref 1.7–2.3)
MCH RBC QN AUTO: 29.9 PG (ref 27–31)
MCHC RBC AUTO-ENTMCNC: 33.7 G/DL (ref 32–36)
MCV RBC AUTO: 88.7 FL (ref 80–96)
MONOCYTES # BLD AUTO: 0.59 K/UL (ref 0–0.8)
MONOCYTES NFR BLD AUTO: 6.6 % (ref 2–6)
MPC BLD CALC-MCNC: 10.3 FL (ref 9.4–12.4)
NEUTROPHILS # BLD AUTO: 6.17 K/UL (ref 1.8–7.7)
NEUTROPHILS NFR BLD AUTO: 69.5 % (ref 53–65)
NRBC # BLD AUTO: 0 X10E3/UL
NRBC, AUTO (.00): 0 %
NT-PROBNP SERPL-MCNC: 1245 PG/ML (ref 1–125)
PLATELET # BLD AUTO: 309 K/UL (ref 150–400)
POTASSIUM SERPL-SCNC: 3.8 MMOL/L (ref 3.5–5.1)
PR INTERVAL: ABNORMAL
PROT SERPL-MCNC: 7.9 G/DL (ref 6.4–8.2)
PROTHROMBIN TIME: 14.8 SECONDS (ref 11.7–14.7)
PRT AXES: ABNORMAL
QRS DURATION: ABNORMAL
QT/QTC: ABNORMAL
RBC # BLD AUTO: 5.05 M/UL (ref 4.6–6.2)
SODIUM SERPL-SCNC: 142 MMOL/L (ref 136–145)
TROPONIN I SERPL DL<=0.01 NG/ML-MCNC: 7.5 PG/ML
VENTRICULAR RATE: ABNORMAL
WBC # BLD AUTO: 8.89 K/UL (ref 4.5–11)

## 2023-08-15 PROCEDURE — 3079F DIAST BP 80-89 MM HG: CPT | Mod: ,,, | Performed by: FAMILY MEDICINE

## 2023-08-15 PROCEDURE — 99214 OFFICE O/P EST MOD 30 MIN: CPT | Mod: ,,, | Performed by: FAMILY MEDICINE

## 2023-08-15 PROCEDURE — 1160F PR REVIEW ALL MEDS BY PRESCRIBER/CLIN PHARMACIST DOCUMENTED: ICD-10-PCS | Mod: ,,, | Performed by: FAMILY MEDICINE

## 2023-08-15 PROCEDURE — 99285 PR EMERGENCY DEPT VISIT,LEVEL V: ICD-10-PCS | Mod: ,,, | Performed by: EMERGENCY MEDICINE

## 2023-08-15 PROCEDURE — 85610 PROTHROMBIN TIME: CPT | Performed by: EMERGENCY MEDICINE

## 2023-08-15 PROCEDURE — 84484 ASSAY OF TROPONIN QUANT: CPT | Performed by: EMERGENCY MEDICINE

## 2023-08-15 PROCEDURE — 96374 THER/PROPH/DIAG INJ IV PUSH: CPT

## 2023-08-15 PROCEDURE — 3008F PR BODY MASS INDEX (BMI) DOCUMENTED: ICD-10-PCS | Mod: ,,, | Performed by: FAMILY MEDICINE

## 2023-08-15 PROCEDURE — 3074F PR MOST RECENT SYSTOLIC BLOOD PRESSURE < 130 MM HG: ICD-10-PCS | Mod: ,,, | Performed by: FAMILY MEDICINE

## 2023-08-15 PROCEDURE — 3074F SYST BP LT 130 MM HG: CPT | Mod: ,,, | Performed by: FAMILY MEDICINE

## 2023-08-15 PROCEDURE — 93005 ELECTROCARDIOGRAM TRACING: CPT

## 2023-08-15 PROCEDURE — 3044F HG A1C LEVEL LT 7.0%: CPT | Mod: ,,, | Performed by: FAMILY MEDICINE

## 2023-08-15 PROCEDURE — 99285 EMERGENCY DEPT VISIT HI MDM: CPT | Mod: 25

## 2023-08-15 PROCEDURE — 93005 POCT EKG 12-LEAD: ICD-10-PCS | Mod: ,,, | Performed by: FAMILY MEDICINE

## 2023-08-15 PROCEDURE — 85025 COMPLETE CBC W/AUTO DIFF WBC: CPT | Performed by: EMERGENCY MEDICINE

## 2023-08-15 PROCEDURE — 93010 EKG 12-LEAD: ICD-10-PCS | Mod: 76,,, | Performed by: INTERNAL MEDICINE

## 2023-08-15 PROCEDURE — 3044F PR MOST RECENT HEMOGLOBIN A1C LEVEL <7.0%: ICD-10-PCS | Mod: ,,, | Performed by: FAMILY MEDICINE

## 2023-08-15 PROCEDURE — 80053 COMPREHEN METABOLIC PANEL: CPT | Performed by: EMERGENCY MEDICINE

## 2023-08-15 PROCEDURE — 96361 HYDRATE IV INFUSION ADD-ON: CPT

## 2023-08-15 PROCEDURE — 85730 THROMBOPLASTIN TIME PARTIAL: CPT | Performed by: EMERGENCY MEDICINE

## 2023-08-15 PROCEDURE — 3079F PR MOST RECENT DIASTOLIC BLOOD PRESSURE 80-89 MM HG: ICD-10-PCS | Mod: ,,, | Performed by: FAMILY MEDICINE

## 2023-08-15 PROCEDURE — 93005 ELECTROCARDIOGRAM TRACING: CPT | Mod: ,,, | Performed by: FAMILY MEDICINE

## 2023-08-15 PROCEDURE — 93306 TTE W/DOPPLER COMPLETE: CPT | Mod: 26,,, | Performed by: STUDENT IN AN ORGANIZED HEALTH CARE EDUCATION/TRAINING PROGRAM

## 2023-08-15 PROCEDURE — 3008F BODY MASS INDEX DOCD: CPT | Mod: ,,, | Performed by: FAMILY MEDICINE

## 2023-08-15 PROCEDURE — 99214 PR OFFICE/OUTPT VISIT, EST, LEVL IV, 30-39 MIN: ICD-10-PCS | Mod: ,,, | Performed by: FAMILY MEDICINE

## 2023-08-15 PROCEDURE — 25000003 PHARM REV CODE 250: Performed by: EMERGENCY MEDICINE

## 2023-08-15 PROCEDURE — 93010 ELECTROCARDIOGRAM REPORT: CPT | Mod: 76,,, | Performed by: INTERNAL MEDICINE

## 2023-08-15 PROCEDURE — 1159F MED LIST DOCD IN RCRD: CPT | Mod: ,,, | Performed by: FAMILY MEDICINE

## 2023-08-15 PROCEDURE — 99285 EMERGENCY DEPT VISIT HI MDM: CPT | Mod: ,,, | Performed by: EMERGENCY MEDICINE

## 2023-08-15 PROCEDURE — 1159F PR MEDICATION LIST DOCUMENTED IN MEDICAL RECORD: ICD-10-PCS | Mod: ,,, | Performed by: FAMILY MEDICINE

## 2023-08-15 PROCEDURE — 83880 ASSAY OF NATRIURETIC PEPTIDE: CPT | Performed by: EMERGENCY MEDICINE

## 2023-08-15 PROCEDURE — 93306 CV EXTERNAL ECHO (CUPID ONLY): ICD-10-PCS | Mod: 26,,, | Performed by: STUDENT IN AN ORGANIZED HEALTH CARE EDUCATION/TRAINING PROGRAM

## 2023-08-15 PROCEDURE — 83735 ASSAY OF MAGNESIUM: CPT | Performed by: EMERGENCY MEDICINE

## 2023-08-15 PROCEDURE — 1160F RVW MEDS BY RX/DR IN RCRD: CPT | Mod: ,,, | Performed by: FAMILY MEDICINE

## 2023-08-15 RX ORDER — METOPROLOL TARTRATE 25 MG/1
25 TABLET, FILM COATED ORAL 2 TIMES DAILY
Qty: 60 TABLET | Refills: 0 | Status: CANCELLED | OUTPATIENT
Start: 2023-08-15

## 2023-08-15 RX ORDER — DILTIAZEM HYDROCHLORIDE 5 MG/ML
20 INJECTION INTRAVENOUS
Status: COMPLETED | OUTPATIENT
Start: 2023-08-15 | End: 2023-08-15

## 2023-08-15 RX ORDER — APIXABAN 5 MG/1
5 TABLET, FILM COATED ORAL 2 TIMES DAILY
Qty: 60 TABLET | Refills: 0 | Status: CANCELLED | OUTPATIENT
Start: 2023-08-15

## 2023-08-15 RX ORDER — METOPROLOL TARTRATE 25 MG/1
25 TABLET, FILM COATED ORAL 2 TIMES DAILY
Status: ON HOLD | COMMUNITY
Start: 2023-08-09 | End: 2023-08-17 | Stop reason: HOSPADM

## 2023-08-15 RX ORDER — APIXABAN 5 MG/1
5 TABLET, FILM COATED ORAL 2 TIMES DAILY
COMMUNITY
Start: 2023-08-09 | End: 2023-09-08 | Stop reason: SDUPTHER

## 2023-08-15 RX ADMIN — SODIUM CHLORIDE 1000 ML: 9 INJECTION, SOLUTION INTRAVENOUS at 10:08

## 2023-08-15 RX ADMIN — DILTIAZEM HYDROCHLORIDE 20 MG: 5 INJECTION INTRAVENOUS at 10:08

## 2023-08-15 NOTE — PROGRESS NOTES
Spike Johnson MD    [facility], [address] [place] [address]     PATIENT NAME: Jason Chaudhary  : 1975  DATE: 8/15/23  MRN: [id number]      Billing Provider: Spike Johnson MD  Level of Service: [place] OFFICE/OUTPT VISIT, EST, LEVL IV, 30-39 MIN  Patient PCP Information       Provider PCP Type    Spike Johnson MD General            Reason for Visit / Chief Complaint: ER follow-up (Here for one week f/u from [facility] ER. He had echo done and was started on Eliquis 5 mg bid and Metoprolol 25 mg bid. He has f/u appt   with  Dr. Saha.) and Chest Pain (C/o tightness in chest since last Monday morning when he went to ER. He has been monitoring pulse at home and he notices heart rate gets rapid for about 20 minutes at  a time. He reports heart rate was 57 before he came to clinic today. )       Update PCP  Update Chief Complaint         History of Present Illness / Problem Focused Workflow     Jason Chaudhary presents to the clinic with ER follow-up (Here for one week f/u from [facility] ER. He had echo done and was started on Eliquis 5 mg bid and Metoprolol 25 mg bid. He has f/u appt   with  Dr. Saha.) and Chest Pain (C/o tightness in chest since last Monday morning when he went to ER. He has been monitoring pulse at home and he notices heart rate gets rapid for about 20 minutes at  a time. He reports heart rate was 57 before he came to clinic today. )     Mr. Chaudhary presents to clinic for TCC visit from recent hospital stay at [facility] for New Onset Atrial Fibrillation with RVR. Based on the hospital discharge summary, he was discharged while still in A-fib with RVR, and an appointment was set up with Cardiac Nurse Practitioner for Dr. Yan. When he arrives in clinic today, he is in A-fib with RVR, rate in the 140s. He is on blood thinners, but the rate of 140s is quite concerning. This is not a long  term rate.       HPI    Review of Systems     Review of Systems   Constitutional:  Negative for activity change, appetite change, chills, fatigue and fever.   HENT:  Negative for nasal congestion, ear pain, hearing loss, postnasal drip and sore throat.    Respiratory:  Positive for shortness of breath. Negative for cough, chest tightness and wheezing.    Cardiovascular:  Positive for palpitations. Negative for chest pain, leg swelling and claudication.   Gastrointestinal:  Negative for abdominal pain, change in bowel habit, constipation, diarrhea, nausea, vomiting and change in bowel habit.   Genitourinary:  Negative for dysuria.   Musculoskeletal:  Negative for arthralgias, back pain, gait problem and myalgias.   Integumentary:  Negative for rash.   Neurological:  Negative for weakness and headaches.   Psychiatric/Behavioral:  Negative for suicidal ideas. The patient is not nervous/anxious.         Medical / Social / Family History     Past Medical History:   Diagnosis Date    Allergy     Hypertension     NEGRITA (obstructive sleep apnea)     Psoriasis        Past Surgical History:   Procedure Laterality Date    ECHOCARDIOGRAM,TRANSESOPHAGEAL N/A 8/16/2023    Procedure: Transesophageal echo (JUVENAL) intra-procedure log documentation;  Surgeon: Cal Stock MD;  Location: [facility] CATH LAB;  Service: Cardiology;  Laterality: N/A;    RECONSTRUCTION OF CHEST WALL Bilateral     TREATMENT OF CARDIAC ARRHYTHMIA N/A 8/16/2023    Procedure: Cardioversion or Defibrillation;  Surgeon: Cal Stock MD;  Location: [facility] CATH LAB;  Service: Cardiology;  Laterality: N/A;    WRIST SURGERY Left     cyst removal       Social History    reports that he has never smoked. He has never been exposed to tobacco smoke. He has never used smokeless tobacco. He reports that he does not drink alcohol and does not use drugs.    Family History  's family history includes Diabetes in his father; Heart failure in his father; Hypertension in  his father and mother.    Medications and Allergies     Medications  Outpatient Medications Marked as Taking for the 8/15/23 encounter (Office Visit) with Spike Johnson MD   Medication Sig Dispense Refill    ELIQUIS 5 mg Tab Take 5 mg by mouth 2 (two) times daily.      fexofenadine (ALLEGRA) 180 MG tablet Take 180 mg by mouth once daily.      guselkumab (TREMFYA) 100 mg/mL AtIn 100mg SC x1 on wk 0, 4 then q8wks 2 each 2    [DISCONTINUED] metoprolol tartrate (LOPRESSOR) 25 MG tablet Take 25 mg by mouth 2 (two) times daily.         Allergies  Review of patient's allergies indicates:   Allergen Reactions    Pcn [penicillins]     Penicillin g potassium Rash and Nausea And Vomiting       Physical Examination     Vitals:    08/15/23 1528   BP:    Pulse: 96   Temp:      Physical Exam  Vitals and nursing note reviewed.   Constitutional:       General: He is not in acute distress.     Appearance: Normal appearance. He is not ill-appearing.   Eyes:      Extraocular Movements: Extraocular movements intact.      Pupils: Pupils are equal, round, and reactive to light.   Cardiovascular:      Rate and Rhythm: Tachycardia present. Rhythm irregularly irregular.      Heart sounds: Normal heart sounds.   Pulmonary:      Effort: Pulmonary effort is normal.      Breath sounds: Normal breath sounds.   Abdominal:      General: Bowel sounds are normal.      Palpations: Abdomen is soft.   Musculoskeletal:         General: Normal range of motion.   Skin:     Findings: No rash.   Neurological:      General: No focal deficit present.      Mental Status: He is alert and oriented to person, place, and time. Mental status is at baseline.   Psychiatric:         Mood and Affect: Mood normal.         Behavior: Behavior normal.            Assessment and Plan (including Health Maintenance)      Problem List  Smart Sets  Document Outside HM   :    Plan:     He was in A-fib with RVR upon arrival to clinic. After rest his heart rate was down to  80s. He is instructed that if his rate goes up again and does not respond to rest he is to report to the ER immediately. I spoke with Cardiology, Dr. Potts, and he agreed with the plan and set patient up to see him in 3 days.     Health Maintenance Due   Topic Date Due    TETANUS VACCINE  Never done    COVID-19 Vaccine (3 - Pfizer series) 06/03/2021    Colorectal Cancer Screening  Never done       Problem List Items Addressed This Visit          Derm    Psoriasis (Chronic)       Cardiac/Vascular    Essential hypertension (Chronic)    Overview      - Goal blood pressure for most patients is less than 130/85. Both numbers are important. If you have questions about your specific goal blood pressure, please ask at your clinic visits.   - Check blood pressure outside of clinic, record the numbers, and bring the log to all your office visits.   - If you have any chest pain, SOB, or other concerning symptoms, report these immediately, or go to the nearest ER.    - Recommend cardiovascular exercise, at least 3 times per week, for at least 15 minutes.   - Eat a heart healthy diet.            Atrial fibrillation with RVR - Primary (Chronic)    Tachycardia    Relevant Orders    POCT EKG 12-LEAD (Manually Resulted by Ordering Provider) (Completed)     Other Visit Diagnoses       Hypokalemia                Health Maintenance Topics with due status: Not Due       Topic Last Completion Date    Influenza Vaccine 11/01/2022    Lipid Panel 04/27/2023    Hemoglobin A1c (Diabetic Prevention Screening) 08/16/2023       Procedures     Future Appointments   Date Time Provider Department Center   8/23/2023 10:00 AM [facility] GI ROOM 02 [facility]   8/31/2023  9:00 AM Spike Johnson MD [facility] [place]   9/8/2023  9:00 AM Cal Stock MD [facility] CARD [facility]   10/27/2023  1:00 PM Spike Johnson MD [facility] [place]   11/7/2023  1:00 PM Shelly Tracey FNP [facility]   5/3/2024 12:45 PM  Spike Johnson MD [facility] [place]        Follow up in about 1 month (around 9/15/2023), or if symptoms worsen or fail to improve, for chronic health problems.     Signature:  Spike Johnson MD  [address] [place] [address]  Phone #: [phone]  Fax #: [phone]    Date of encounter: 8/15/23    There are no Patient Instructions on file for this visit.

## 2023-08-16 PROBLEM — R07.9 CHEST PAIN: Status: ACTIVE | Noted: 2023-08-16

## 2023-08-16 PROBLEM — G47.33 OSA (OBSTRUCTIVE SLEEP APNEA): Status: ACTIVE | Noted: 2023-08-16

## 2023-08-16 LAB
EST. AVERAGE GLUCOSE BLD GHB EST-MCNC: 90 MG/DL
HBA1C MFR BLD HPLC: 5.3 % (ref 4.5–6.6)
RA PRESSURE ESTIMATED: 3 MMHG
SARS-COV-2 RDRP RESP QL NAA+PROBE: NEGATIVE
TROPONIN I SERPL DL<=0.01 NG/ML-MCNC: 8 PG/ML
TSH SERPL DL<=0.005 MIU/L-ACNC: 2.11 UIU/ML (ref 0.36–3.74)

## 2023-08-16 PROCEDURE — 87635 SARS-COV-2 COVID-19 AMP PRB: CPT | Performed by: HOSPITALIST

## 2023-08-16 PROCEDURE — 99222 1ST HOSP IP/OBS MODERATE 55: CPT | Mod: ,,, | Performed by: HOSPITALIST

## 2023-08-16 PROCEDURE — 11000001 HC ACUTE MED/SURG PRIVATE ROOM

## 2023-08-16 PROCEDURE — 93010 ELECTROCARDIOGRAM REPORT: CPT | Mod: ,,, | Performed by: INTERNAL MEDICINE

## 2023-08-16 PROCEDURE — 99223 1ST HOSP IP/OBS HIGH 75: CPT | Mod: 25,,, | Performed by: STUDENT IN AN ORGANIZED HEALTH CARE EDUCATION/TRAINING PROGRAM

## 2023-08-16 PROCEDURE — 92960 CARDIOVERSION ELECTRIC EXT: CPT | Mod: ,,, | Performed by: STUDENT IN AN ORGANIZED HEALTH CARE EDUCATION/TRAINING PROGRAM

## 2023-08-16 PROCEDURE — 99153 MOD SED SAME PHYS/QHP EA: CPT | Performed by: STUDENT IN AN ORGANIZED HEALTH CARE EDUCATION/TRAINING PROGRAM

## 2023-08-16 PROCEDURE — 84484 ASSAY OF TROPONIN QUANT: CPT | Performed by: GENERAL PRACTICE

## 2023-08-16 PROCEDURE — 99152 MOD SED SAME PHYS/QHP 5/>YRS: CPT | Performed by: STUDENT IN AN ORGANIZED HEALTH CARE EDUCATION/TRAINING PROGRAM

## 2023-08-16 PROCEDURE — 99222 PR INITIAL HOSPITAL CARE,LEVL II: ICD-10-PCS | Mod: ,,, | Performed by: HOSPITALIST

## 2023-08-16 PROCEDURE — 25000003 PHARM REV CODE 250: Performed by: STUDENT IN AN ORGANIZED HEALTH CARE EDUCATION/TRAINING PROGRAM

## 2023-08-16 PROCEDURE — 92960 PR CARDIOVERSION, ELECTIVE;EXTERN: ICD-10-PCS | Mod: ,,, | Performed by: STUDENT IN AN ORGANIZED HEALTH CARE EDUCATION/TRAINING PROGRAM

## 2023-08-16 PROCEDURE — 92960 CARDIOVERSION ELECTRIC EXT: CPT | Performed by: STUDENT IN AN ORGANIZED HEALTH CARE EDUCATION/TRAINING PROGRAM

## 2023-08-16 PROCEDURE — 99223 PR INITIAL HOSPITAL CARE,LEVL III: ICD-10-PCS | Mod: 25,,, | Performed by: STUDENT IN AN ORGANIZED HEALTH CARE EDUCATION/TRAINING PROGRAM

## 2023-08-16 PROCEDURE — 99152 MOD SED SAME PHYS/QHP 5/>YRS: CPT | Mod: ,,, | Performed by: STUDENT IN AN ORGANIZED HEALTH CARE EDUCATION/TRAINING PROGRAM

## 2023-08-16 PROCEDURE — 84443 ASSAY THYROID STIM HORMONE: CPT | Performed by: GENERAL PRACTICE

## 2023-08-16 PROCEDURE — 63600175 PHARM REV CODE 636 W HCPCS: Performed by: STUDENT IN AN ORGANIZED HEALTH CARE EDUCATION/TRAINING PROGRAM

## 2023-08-16 PROCEDURE — 99152 PR MOD CONSCIOUS SEDATION, SAME PHYS, 5+ YRS, FIRST 15 MIN: ICD-10-PCS | Mod: ,,, | Performed by: STUDENT IN AN ORGANIZED HEALTH CARE EDUCATION/TRAINING PROGRAM

## 2023-08-16 PROCEDURE — 83036 HEMOGLOBIN GLYCOSYLATED A1C: CPT | Performed by: GENERAL PRACTICE

## 2023-08-16 PROCEDURE — 25500020 PHARM REV CODE 255: Performed by: STUDENT IN AN ORGANIZED HEALTH CARE EDUCATION/TRAINING PROGRAM

## 2023-08-16 PROCEDURE — 25000003 PHARM REV CODE 250: Performed by: GENERAL PRACTICE

## 2023-08-16 PROCEDURE — 25000003 PHARM REV CODE 250: Performed by: HOSPITALIST

## 2023-08-16 PROCEDURE — 25000003 PHARM REV CODE 250: Performed by: REGISTERED NURSE

## 2023-08-16 PROCEDURE — 93010 EKG 12-LEAD: ICD-10-PCS | Mod: ,,, | Performed by: INTERNAL MEDICINE

## 2023-08-16 PROCEDURE — 94761 N-INVAS EAR/PLS OXIMETRY MLT: CPT

## 2023-08-16 RX ORDER — SODIUM CHLORIDE 9 MG/ML
INJECTION, SOLUTION INTRAVENOUS
Status: DISPENSED
Start: 2023-08-16 | End: 2023-08-17

## 2023-08-16 RX ORDER — METOPROLOL TARTRATE 25 MG/1
25 TABLET, FILM COATED ORAL 2 TIMES DAILY
Status: DISCONTINUED | OUTPATIENT
Start: 2023-08-16 | End: 2023-08-16

## 2023-08-16 RX ORDER — MIDAZOLAM HYDROCHLORIDE 1 MG/ML
INJECTION INTRAMUSCULAR; INTRAVENOUS
Status: DISCONTINUED | OUTPATIENT
Start: 2023-08-16 | End: 2023-08-16 | Stop reason: HOSPADM

## 2023-08-16 RX ORDER — METOPROLOL TARTRATE 50 MG/1
50 TABLET ORAL 2 TIMES DAILY
Status: DISCONTINUED | OUTPATIENT
Start: 2023-08-16 | End: 2023-08-17 | Stop reason: HOSPADM

## 2023-08-16 RX ORDER — SIMETHICONE 80 MG
1 TABLET,CHEWABLE ORAL 3 TIMES DAILY PRN
Status: DISCONTINUED | OUTPATIENT
Start: 2023-08-16 | End: 2023-08-17 | Stop reason: HOSPADM

## 2023-08-16 RX ORDER — SODIUM CHLORIDE 0.9 % (FLUSH) 0.9 %
10 SYRINGE (ML) INJECTION EVERY 12 HOURS PRN
Status: DISCONTINUED | OUTPATIENT
Start: 2023-08-16 | End: 2023-08-17 | Stop reason: HOSPADM

## 2023-08-16 RX ORDER — FENTANYL CITRATE 50 UG/ML
INJECTION, SOLUTION INTRAMUSCULAR; INTRAVENOUS
Status: DISCONTINUED | OUTPATIENT
Start: 2023-08-16 | End: 2023-08-16 | Stop reason: HOSPADM

## 2023-08-16 RX ORDER — ACETAMINOPHEN 500 MG
1000 TABLET ORAL EVERY 6 HOURS PRN
Status: DISCONTINUED | OUTPATIENT
Start: 2023-08-16 | End: 2023-08-17 | Stop reason: HOSPADM

## 2023-08-16 RX ORDER — GUAIFENESIN/DEXTROMETHORPHAN 100-10MG/5
10 SYRUP ORAL EVERY 6 HOURS PRN
Status: DISCONTINUED | OUTPATIENT
Start: 2023-08-16 | End: 2023-08-17 | Stop reason: HOSPADM

## 2023-08-16 RX ORDER — DILTIAZEM HYDROCHLORIDE 5 MG/ML
20 INJECTION INTRAVENOUS
Status: COMPLETED | OUTPATIENT
Start: 2023-08-16 | End: 2023-08-16

## 2023-08-16 RX ORDER — ONDANSETRON 2 MG/ML
8 INJECTION INTRAMUSCULAR; INTRAVENOUS EVERY 6 HOURS PRN
Status: DISCONTINUED | OUTPATIENT
Start: 2023-08-16 | End: 2023-08-17 | Stop reason: HOSPADM

## 2023-08-16 RX ORDER — BISACODYL 5 MG
10 TABLET, DELAYED RELEASE (ENTERIC COATED) ORAL DAILY PRN
Status: DISCONTINUED | OUTPATIENT
Start: 2023-08-16 | End: 2023-08-17 | Stop reason: HOSPADM

## 2023-08-16 RX ORDER — NALOXONE HCL 0.4 MG/ML
0.02 VIAL (ML) INJECTION
Status: DISCONTINUED | OUTPATIENT
Start: 2023-08-16 | End: 2023-08-17 | Stop reason: HOSPADM

## 2023-08-16 RX ORDER — TRAZODONE HYDROCHLORIDE 50 MG/1
50 TABLET ORAL NIGHTLY PRN
Status: DISCONTINUED | OUTPATIENT
Start: 2023-08-16 | End: 2023-08-17 | Stop reason: HOSPADM

## 2023-08-16 RX ADMIN — DILTIAZEM HYDROCHLORIDE 20 MG: 5 INJECTION INTRAVENOUS at 01:08

## 2023-08-16 RX ADMIN — APIXABAN 5 MG: 5 TABLET, FILM COATED ORAL at 08:08

## 2023-08-16 RX ADMIN — METOPROLOL TARTRATE 50 MG: 50 TABLET, FILM COATED ORAL at 08:08

## 2023-08-16 NOTE — CONSULTS
[facility] - Emergency Department  Cardiology  Consult Note    Patient Name: Jason Chaudhary  MRN: [id number]  Admission Date: 8/15/2023  Hospital Length of Stay: 0 days  Code Status: Full Code   Attending Provider: No att. providers found   Consulting Provider: GRAZYNA Richardson  Primary Care Physician: Spike Johnson MD  Principal Problem:Atrial flutter    Patient information was obtained from patient, past medical records and ER records.     Inpatient consult to Cardiology  Consult performed by: Antony Renteria ACNP  Consult ordered by: Lrary Durbin MD  Reason for consult: New onset Aflutter        Subjective:     Chief Complaint:  Dyspnea, Palpitations     HPI:   48 y/o male who is seen in consult today for PAF after admission overnight. He presented to the ED c/o of chest tightness, dyspnea and palpitations. Symptoms orginally started about one week ago and he was seen at [facility] ED were he was rate controlled and placed on Eliquis. He was scheduled for a pending cardiology appointment. After discharge he had several episodes of of the above mentioned symptoms. Yesterday he had a follow up with his PCP and again had chest tightness, dyspnea, flushing and was sent to the ED for evaluation. Patient denies alcohol or tobacco use.     Workup: CBC unremarkable, slightly elevated ALT of 72. p-BNP 1245. Troponin 7.5 --> 8. EKG: Atrial Flutter with . IV Cardizem in ED repeated EKG showed Atrial flutter 4:1. Echo done last Wednesday at [facility] with preliminary report showing EF>50%. Chest xray showing cardiomegaly but no signs of acute infiltrates or edema    Past Medical History: NEGRITA (Compliant with Cpap), HTN, psoriasis.                 Past Medical History:   Diagnosis Date    Allergy     Hypertension     NEGRITA (obstructive sleep apnea)     Psoriasis        Past Surgical History:   Procedure Laterality Date    RECONSTRUCTION OF CHEST WALL Bilateral     WRIST SURGERY Left      cyst removal       Review of patient's allergies indicates:   Allergen Reactions    Pcn [penicillins]     Penicillin g potassium Rash and Nausea And Vomiting       No current facility-administered medications on file prior to encounter.     Current Outpatient Medications on File Prior to Encounter   Medication Sig    ELIQUIS 5 mg Tab Take 5 mg by mouth 2 (two) times daily.    fexofenadine (ALLEGRA) 180 MG tablet Take 180 mg by mouth once daily.    guselkumab (TREMFYA) 100 mg/mL AtIn 100mg SC x1 on wk 0, 4 then q8wks    metoprolol tartrate (LOPRESSOR) 25 MG tablet Take 25 mg by mouth 2 (two) times daily.    multivit-min-folic acid-vit K (MULTI FOR HIM, NO IRON,) 400-40 mcg Cap as directed Orally    naproxen sodium (ANAPROX) 220 MG tablet 1 tablet with food or milk as needed Orally every 12 hrs     Family History       Problem Relation (Age of Onset)    Diabetes Father    Heart failure Father    Hypertension Mother, Father          Tobacco Use    Smoking status: Never     Passive exposure: Never    Smokeless tobacco: Never   Substance and Sexual Activity    Alcohol use: Never    Drug use: Never    Sexual activity: Yes     Partners: Female     Review of Systems   Constitutional: Negative for chills and malaise/fatigue.   Cardiovascular:  Positive for irregular heartbeat and palpitations. Negative for leg swelling, near-syncope and orthopnea.        Chest tightness   Respiratory:  Positive for shortness of breath.    All other systems reviewed and are negative.    Objective:     Vital Signs (Most Recent):  Temp: 98.8 °F (37.1 °C) (08/16/23 1021)  Pulse: 92 (08/16/23 1501)  Resp: (!) 25 (08/16/23 1501)  BP: 113/68 (08/16/23 1501)  SpO2: (!) 92 % (08/16/23 1501) Vital Signs (24h Range):  Temp:  [98.8 °F (37.1 °C)-99 °F (37.2 °C)] 98.8 °F (37.1 °C)  Pulse:  [] 92  Resp:  [11-29] 25  SpO2:  [91 %-96 %] 92 %  BP: (107-155)/() 113/68     Weight: (!) 148.8 kg (328 lb)  Body mass index is 37.9 kg/m².    SpO2: (!) 92  %         Intake/Output Summary (Last 24 hours) at 8/16/2023 1513  Last data filed at 8/15/2023 2350  Gross per 24 hour   Intake 1000 ml   Output --   Net 1000 ml     Physical Exam   Vitals and nursing note reviewed.      Constitutional:     General: No acute distress.   Appearance: not ill-appearing.      HENT:    Head: Normocephalic and atraumatic.     Mouth: Mucous membranes are moist.      Eyes:    Extraocular Movements: Extraocular movements intact.    Pupils: Pupils are equal, round, and reactive to light.      Cardiovascular:    Rate and Rhythm: Tachycardia and irregular rhythm.    Pulses: Normal pulses.    Heart sounds: No murmur heard.     Pulmonary:    Effort: Pulmonary effort is normal. No respiratory distress.    Breath sounds: Normal breath sounds.      Abdominal:    General: Bowel sounds are normal. There is no distension.    Palpations: Abdomen is soft.    Tenderness: There is no abdominal tenderness. There is no guarding.      Musculoskeletal:       General: No swelling or tenderness.    Cervical back: Normal range of motion and neck supple.      Skin:   General: Skin is warm and dry.    Capillary Refill: Capillary refill takes less than 2 seconds.      Neurological:    General: No focal deficit present.    Mental Status: He is alert and oriented to person, place, and time.    Cranial Nerves: No cranial nerve deficit.    Sensory: No sensory deficit.      Psychiatric:       Mood and Affect: Mood normal.        Behavior: Behavior normal.                     Lines/Drains/Airways       Peripheral Intravenous Line  Duration                  Peripheral IV - Single Lumen 08/15/23 2202 20 G Anterior;Distal;Left Upper Arm <1 day                     Physical Exam     Significant Labs: All pertinent lab results from the last 24 hours have been reviewed.    Significant Imaging: Echocardiogram: Transthoracic echo (TTE) complete (Cupid Only): No results found for this or any previous visit.    Assessment and Plan:      * Atrial flutter  8/16/23  Patient with Paroxysmal (<7 days) atrial flutter which is uncontrolled currently with Beta Blocker. [id number] Score: 1. HASBLED Score: 1.  Anticoagulation done with Eliquis 5 mg bid    - Patient seen and evaluated by Dr. Stock   - Troponin 7.5 --> 8 - EKG: Aflutter  W/o ischemic changes   - Echo: EF 50-55% - Lipid panel (low HDL) - TSH (wnl)  - Lopressor 50 mg bid   - Educate on medication adherence, blood pressure control, stress reduction, cholesterol, smoking cessation, tobacco use, weight management, diet, diabetes, physical activity risk factor modifications    - JUVENAL/Cardioversion in today. risk benefit discussed and consent signed   - No electrolyte derangements - monitor and replace as needed.          NEGRITA (obstructive sleep apnea)  8/16/23  - Compliant with Cpap    Essential hypertension  8/16/23  - Monitor  - HCTZ recently stopped due to hypotension        VTE Risk Mitigation (From admission, onward)           Ordered     apixaban tablet 5 mg  2 times daily         08/16/23 0057     Place ABRAHAM hose  Until discontinued         08/16/23 0257                    Thank you for your consult. I will follow-up with patient. Please contact us if you have any additional questions.    Antony Renteria, RANCHOP  Cardiology   [facility] - Emergency Department

## 2023-08-16 NOTE — SUBJECTIVE & OBJECTIVE
Past Medical History:   Diagnosis Date    Allergy     Hypertension     NEGRITA (obstructive sleep apnea)     Psoriasis        Past Surgical History:   Procedure Laterality Date    RECONSTRUCTION OF CHEST WALL Bilateral     WRIST SURGERY Left     cyst removal       Review of patient's allergies indicates:   Allergen Reactions    Pcn [penicillins]     Penicillin g potassium Rash and Nausea And Vomiting       No current facility-administered medications on file prior to encounter.     Current Outpatient Medications on File Prior to Encounter   Medication Sig    ELIQUIS 5 mg Tab Take 5 mg by mouth 2 (two) times daily.    fexofenadine (ALLEGRA) 180 MG tablet Take 180 mg by mouth once daily.    guselkumab (TREMFYA) 100 mg/mL AtIn 100mg SC x1 on wk 0, 4 then q8wks    metoprolol tartrate (LOPRESSOR) 25 MG tablet Take 25 mg by mouth 2 (two) times daily.    multivit-min-folic acid-vit K (MULTI FOR HIM, NO IRON,) 400-40 mcg Cap as directed Orally    naproxen sodium (ANAPROX) 220 MG tablet 1 tablet with food or milk as needed Orally every 12 hrs     Family History       Problem Relation (Age of Onset)    Diabetes Father    Heart failure Father    Hypertension Mother, Father          Tobacco Use    Smoking status: Never     Passive exposure: Never    Smokeless tobacco: Never   Substance and Sexual Activity    Alcohol use: Never    Drug use: Never    Sexual activity: Yes     Partners: Female     Review of Systems   Constitutional: Negative for chills and malaise/fatigue.   Cardiovascular:  Positive for irregular heartbeat and palpitations. Negative for leg swelling, near-syncope and orthopnea.        Chest tightness   Respiratory:  Positive for shortness of breath.    All other systems reviewed and are negative.    Objective:     Vital Signs (Most Recent):  Temp: 98.8 °F (37.1 °C) (08/16/23 1021)  Pulse: 92 (08/16/23 1501)  Resp: (!) 25 (08/16/23 1501)  BP: 113/68 (08/16/23 1501)  SpO2: (!) 92 % (08/16/23 1501) Vital Signs (24h  Range):  Temp:  [98.8 °F (37.1 °C)-99 °F (37.2 °C)] 98.8 °F (37.1 °C)  Pulse:  [] 92  Resp:  [11-29] 25  SpO2:  [91 %-96 %] 92 %  BP: (107-155)/() 113/68     Weight: (!) 148.8 kg (328 lb)  Body mass index is 37.9 kg/m².    SpO2: (!) 92 %         Intake/Output Summary (Last 24 hours) at 8/16/2023 1513  Last data filed at 8/15/2023 2350  Gross per 24 hour   Intake 1000 ml   Output --   Net 1000 ml       Lines/Drains/Airways       Peripheral Intravenous Line  Duration                  Peripheral IV - Single Lumen 08/15/23 2202 20 G Anterior;Distal;Left Upper Arm <1 day                     Physical Exam     Significant Labs: All pertinent lab results from the last 24 hours have been reviewed.    Significant Imaging: Echocardiogram: Transthoracic echo (TTE) complete (Cupid Only): No results found for this or any previous visit.

## 2023-08-16 NOTE — PLAN OF CARE
[facility]  Initial Discharge Assessment       Primary Care Provider: Spike Johnson MD    Admission Diagnosis: Atrial flutter [I48.92]    Admission Date: 8/15/2023  Expected Discharge Date:     Transition of Care Barriers: None    Payor: BLUE CROSS BLUE SHIELD / Plan: [facility] FEDERAL BASIC / Product Type: PPO /     Extended Emergency Contact Information  Primary Emergency Contact: Caitlin Chaudhary  Home Phone: [phone]  Relation: Mother  Preferred language: English    Discharge Plan A: Home with family  Discharge Plan B: Home with family      [facility] [address] [place] [address] [address] [address]  Phone: [phone] Fax: [phone]    [facility] [address] [facility]  [address] [facility] [address]  Phone: [phone] Fax: [phone]      Initial Assessment (most recent)       Adult Discharge Assessment - 08/16/23 1005          Discharge Assessment    Assessment Type Discharge Planning Assessment     Confirmed/corrected address, phone number and insurance Yes     Confirmed Demographics Correct on Facesheet     Source of Information patient     Communicated LOLI with patient/caregiver Date not available/Unable to determine     People in Home parent(s)     Do you expect to return to your current living situation? Yes     Do you have help at home or someone to help you manage your care at home? Yes     Who are your caregiver(s) and their phone number(s)? Parents     Prior to hospitilization cognitive status: Alert/Oriented     Current cognitive status: Alert/Oriented     Walking or Climbing Stairs --   none    Dressing/Bathing --   none    Home Accessibility wheelchair accessible     Home Layout Able to live on 1st floor     Equipment Currently Used at Home CPAP     Readmission within 30 days? No     Patient currently being followed by outpatient case management? No     Do you currently  have service(s) that help you manage your care at home? No     Do you take prescription medications? Yes     Do you have prescription coverage? Yes     Coverage [employer] Federal     Do you have any problems affording any of your prescribed medications? No     Is the patient taking medications as prescribed? yes     Who is going to help you get home at discharge? Family     How do you get to doctors appointments? car, drives self     Are you on dialysis? No     Do you take coumadin? No     DME Needed Upon Discharge  none     Discharge Plan discussed with: Patient     Transition of Care Barriers None     Discharge Plan A Home with family     Discharge Plan B Home with family        Physical Activity    On average, how many days per week do you engage in moderate to strenuous exercise (like a brisk walk)? 0 days     On average, how many minutes do you engage in exercise at this level? 0 min        Financial Resource Strain    How hard is it for you to pay for the very basics like food, housing, medical care, and heating? Not hard at all        Housing Stability    In the last 12 months, was there a time when you were not able to pay the mortgage or rent on time? No     In the last 12 months, how many places have you lived? 1     In the last 12 months, was there a time when you did not have a steady place to sleep or slept in a shelter (including now)? No        Transportation Needs    In the past 12 months, has lack of transportation kept you from medical appointments or from getting medications? No     In the past 12 months, has lack of transportation kept you from meetings, work, or from getting things needed for daily living? No        Food Insecurity    Within the past 12 months, you worried that your food would run out before you got the money to buy more. Never true     Within the past 12 months, the food you bought just didn't last and you didn't have money to get more. Never true        Stress    Do you feel stress  - tense, restless, nervous, or anxious, or unable to sleep at night because your mind is troubled all the time - these days? Not at all        Social Connections    In a typical week, how many times do you talk on the phone with family, friends, or neighbors? More than three times a week     How often do you get together with friends or relatives? More than three times a week     Do you belong to any clubs or organizations such as Jewish groups, unions, fraternal or athletic groups, or school groups? No                   SS spoke with pt in ER. Pt lives at home with his parents. Pt is not current with home health, no dme. Pt is [facility] [employer], will enter pt information when available in the portal. Discharge plan is to return home with no anticipated needs.

## 2023-08-16 NOTE — ED NOTES
CAMILLA Vigil, Ana,CAMILLA & Dr. Stock left from room after procedure completion. Last dose of sedating medication given @ 1431. Will monitor until 1501.    I reviewed the patient's medical history, the resident's findings on physical examination, the patient's diagnoses, and treatment plan as documented in the resident note. I concur with the treatment plan as documented. Additional suggestions noted. 2 weeks old baby boy for weight check. Weight change is +25%. Breast feeding with some formula supplementation. RTC in 4 weeks for 2 months C.

## 2023-08-16 NOTE — HPI
46 y/o male who is seen in consult today for PAF after admission overnight. He presented to the ED c/o of chest tightness, dyspnea and palpitations. Symptoms orginally started about one week ago and he was seen at [facility] ED were he was rate controlled and placed on Eliquis. He was scheduled for a pending cardiology appointment. After discharge he had several episodes of of the above mentioned symptoms. Yesterday he had a follow up with his PCP and again had chest tightness, dyspnea, flushing and was sent to the ED for evaluation. Patient denies alcohol or tobacco use.     Workup: CBC unremarkable, slightly elevated ALT of 72. p-BNP 1245. Troponin 7.5 --> 8. EKG: Atrial Flutter with . IV Cardizem in ED repeated EKG showed Atrial flutter 4:1. Echo done last Wednesday at [facility] with preliminary report showing EF>50%. Chest xray showing cardiomegaly but no signs of acute infiltrates or edema    Past Medical History: NEGRITA (Compliant with Cpap), HTN, psoriasis.

## 2023-08-16 NOTE — H&P
"[facility] - Emergency Department  [facility] Medicine  History & Physical    Patient Name: Jason Chaudhary  MRN: [id number]  Patient Class: IP- Inpatient  Admission Date: 8/15/2023  Attending Physician: Sena Chaudhary MD   Primary Care Provider: Spike Johnson MD         Patient information was obtained from patient, past medical records and ER records.     Subjective:     Principal Problem:Atrial flutter    Chief Complaint:   Chief Complaint   Patient presents with    Tachycardia     Pt states he was d/c fron [facility] 8-9-23 for high heart rate - pt was seen today by cwebb for f/u - pt states he has been dealing with high heart rate for a week        HPI: Patient is a 46 y/o male who presents to the ED with complaint of tachycardia, chest tightness and SOB that started 1 week ago. Patient states that one day after the symptoms started he saw his PCP, Dr. Johnson and patient was recommended go to the hospital due to EKG changes. Patient presented to [facility] and based on the hospital discharge summary, he was discharged on Eliquis and BB and an appointment was set up with cardiology. Patient states that since he was discharged from the hospital he had few other episodes of chest tightness that last for about 30 minutes. Patient reports that yesterday he went to see his PCP for his f/u appointment and had another episode that he describes as chest tightness, "hot flashes" or pressure inside his chest with SOB. Patient with PMHx of HTN, psoriasis and NEGRITA currently on CPAP at home. 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 Patient denies nausea, vomiting, diaphoresis, fever, chills, urinary or bowel habit changes.    In the ED vital signs showed /101, , RR 20, SpO2 95%, afebrile. Blood work showed CBC unremarkable, slightly elevated ALT of 72. p-BNP 1245. Troponin 7.5, repeated pending. Initial " EKG showed Atrial Flutter with . After patient was given IV Cardizem repeated EKG showed Atrial flutter 4:1. Echo done last Wednesday at [facility] with preliminary report showing EF>50%. Chest xray showing cardiomegaly but no signs of acute infiltrates or edema. Patient will be admitted to the hospital for further management.      Past Medical History:   Diagnosis Date    Allergy     Hypertension     NEGRITA (obstructive sleep apnea)     Psoriasis        Past Surgical History:   Procedure Laterality Date    RECONSTRUCTION OF CHEST WALL Bilateral     WRIST SURGERY Left     cyst removal       Review of patient's allergies indicates:   Allergen Reactions    Pcn [penicillins]     Penicillin g potassium Rash and Nausea And Vomiting       No current facility-administered medications on file prior to encounter.     Current Outpatient Medications on File Prior to Encounter   Medication Sig    ELIQUIS 5 mg Tab Take 5 mg by mouth 2 (two) times daily.    fexofenadine (ALLEGRA) 180 MG tablet Take 180 mg by mouth once daily.    guselkumab (TREMFYA) 100 mg/mL AtIn 100mg SC x1 on wk 0, 4 then q8wks    metoprolol tartrate (LOPRESSOR) 25 MG tablet Take 25 mg by mouth 2 (two) times daily.    multivit-min-folic acid-vit K (MULTI FOR HIM, NO IRON,) 400-40 mcg Cap as directed Orally    naproxen sodium (ANAPROX) 220 MG tablet 1 tablet with food or milk as needed Orally every 12 hrs     Family History       Problem Relation (Age of Onset)    Diabetes Father    Heart failure Father    Hypertension Mother, Father          Tobacco Use    Smoking status: Never     Passive exposure: Never    Smokeless tobacco: Never   Substance and Sexual Activity    Alcohol use: Never    Drug use: Never    Sexual activity: Yes     Partners: Female     Review of Systems   Constitutional:  Negative for chills and fever.   HENT:  Negative for postnasal drip, rhinorrhea, sinus pressure, sneezing, sore throat and trouble swallowing.    Eyes:  Negative for  photophobia and visual disturbance.   Respiratory:  Positive for chest tightness and SOB. Negative for cough and wheezing.    Cardiovascular:  Negative for chest pain, palpitations and leg swelling.   Gastrointestinal:  Negative for abdominal distention, abdominal pain, constipation, diarrhea, nausea and vomiting.   Endocrine: Negative for polydipsia, polyphagia and polyuria.   Genitourinary:  Negative for decreased urine volume, difficulty urinating, frequency and urgency.   Skin:  Negative for wound.   Neurological:  Positive for headaches. Negative for tremors, seizures, speech difficulty, weakness and numbness.   Psychiatric/Behavioral:  Negative for agitation and confusion. The patient is not nervous/anxious and is not hyperactive.      Objective:     Vital Signs (Most Recent):  Temp: 99 °F (37.2 °C) (08/15/23 2150)  Pulse: 87 (08/16/23 0120)  Resp: (!) 29 (08/16/23 0120)  BP: 107/71 (08/16/23 0050)  SpO2: (!) 93 % (08/16/23 0120) Vital Signs (24h Range):  Temp:  [97.8 °F (36.6 °C)-99 °F (37.2 °C)] 99 °F (37.2 °C)  Pulse:  [] 87  Resp:  [11-29] 29  SpO2:  [93 %-96 %] 93 %  BP: (107-152)/() 107/71     Weight: (!) 148.8 kg (328 lb)  Body mass index is 37.9 kg/m².     Physical Exam  Vitals and nursing note reviewed.   Constitutional:       General: He is not in acute distress.     Appearance: Normal appearance. He is obese. He is not ill-appearing, toxic-appearing or diaphoretic.   HENT:      Head: Normocephalic and atraumatic.      Right Ear: External ear normal.      Left Ear: External ear normal.      Nose: Nose normal. No congestion or rhinorrhea.      Mouth/Throat:      Mouth: Mucous membranes are moist.      Pharynx: Oropharynx is clear. No oropharyngeal exudate or posterior oropharyngeal erythema.   Eyes:      General: No scleral icterus.     Extraocular Movements: Extraocular movements intact.      Conjunctiva/sclera: Conjunctivae normal.      Pupils: Pupils are equal, round, and reactive to  light.   Cardiovascular:      Rate and Rhythm: Tachycardia present. Rhythm irregular.      Pulses: Normal pulses.      Heart sounds: Normal heart sounds. No murmur heard.  Pulmonary:      Effort: Pulmonary effort is normal. No respiratory distress.      Breath sounds: No wheezing, rhonchi or rales.   Abdominal:      General: Abdomen is flat. Bowel sounds are normal. There is no distension.      Palpations: Abdomen is soft.      Tenderness: There is no abdominal tenderness. There is no right CVA tenderness, left CVA tenderness, guarding or rebound.   Musculoskeletal:         General: Normal range of motion.      Cervical back: Neck supple. No rigidity or tenderness.      Right lower leg: Edema present.      Left lower leg: Edema present.   Skin:     General: Skin is warm and dry.      Capillary Refill: Capillary refill takes less than 2 seconds.      Coloration: Skin is not jaundiced.      Findings: No lesion or rash.   Neurological:      General: No focal deficit present.      Mental Status: He is alert and oriented to person, place, and time.      Cranial Nerves: No cranial nerve deficit.      Sensory: No sensory deficit.      Motor: No weakness.   Psychiatric:         Mood and Affect: Mood normal.         Behavior: Behavior normal.         Thought Content: Thought content normal.         Judgment: Judgment normal.              CRANIAL NERVES     CN III, IV, VI   Pupils are equal, round, and reactive to light.       Significant Labs: All pertinent labs within the past 24 hours have been reviewed.    Significant Imaging: I have reviewed all pertinent imaging results/findings within the past 24 hours.    Assessment/Plan:     * Atrial flutter  Patient with new onset of Atrial Flutter seen at [facility] last week  Recently started on home BB and Eliquis  Initial EKG showed Atrial Flutter   IV Cardizem was given in ED resulting in rate control  Repeated EKG showed Atrial flutter 4:1  Echo done last Wednesday  at [facility] with preliminary EF>50%  Started on Cardizem drip  Continue BB and Eliquis  Cardiology consulted  Monitor vital signs and physical exam      NEGRITA (obstructive sleep apnea)  Continue home CPAP QHS      Essential hypertension  Home HCTZ was stopped about 1 week ago due to low BP  Monitor BP    VTE Risk Mitigation (From admission, onward)           Ordered     apixaban tablet 5 mg  2 times daily         08/16/23 0057                               Larry Durbin MD  Department of Hospital Medicine  [facility] - Emergency Department

## 2023-08-16 NOTE — ASSESSMENT & PLAN NOTE
Patient with new onset of Atrial Flutter seen at [facility] last week  Recently started on home BB and Eliquis  Initial EKG showed Atrial Flutter   IV Cardizem was given in ED resulting in rate control  Repeated EKG showed Atrial flutter 4:1  Echo done last Wednesday at [facility] with preliminary EF>50%  Started on Cardizem drip  Continue BB and Eliquis  Cardiology consulted  Monitor vital signs and physical exam

## 2023-08-16 NOTE — ASSESSMENT & PLAN NOTE
8/16/23  Patient with Paroxysmal (<7 days) atrial flutter which is uncontrolled currently with Beta Blocker. [id number] Score: 1. HASBLED Score: 1.  Anticoagulation done with Eliquis 5 mg bid    - Patient seen and evaluated by Dr. Stock   - Troponin 7.5 --> 8 - EKG: Aflutter  W/o ischemic changes   - Echo: EF 50-55% - Lipid panel (low HDL) - TSH (wnl)  - Lopressor 50 mg bid   - Educate on medication adherence, blood pressure control, stress reduction, cholesterol, smoking cessation, tobacco use, weight management, diet, diabetes, physical activity risk factor modifications    - JUVENAL/Cardioversion in today. risk benefit discussed and consent signed   - No electrolyte derangements - monitor and replace as needed.

## 2023-08-16 NOTE — HPI
"Patient is a 48 y/o male who presents to the ED with complaint of tachycardia, chest tightness and SOB that started 1 week ago. Patient states that one day after the symptoms started he saw his PCP, Dr. Johnson and patient was recommended go to the hospital due to EKG changes. Patient presented to [facility] and based on the hospital discharge summary, he was discharged on Eliquis and BB and an appointment was set up with cardiology. Patient states that since he was discharged from the hospital he had few other episodes of chest tightness that last for about 30 minutes. Patient reports that yesterday he went to see his PCP for his f/u appointment and had another episode that he describes as chest tightness, "hot flashes" or pressure inside his chest with SOB. Patient with PMHx of HTN, psoriasis and NEGRITA currently on CPAP at home. 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 Patient denies nausea, vomiting, diaphoresis, fever, chills, urinary or bowel habit changes.    In the ED vital signs showed /101, , RR 20, SpO2 95%, afebrile. Blood work showed CBC unremarkable, slightly elevated ALT of 72. p-BNP 1245. Troponin 7.5, repeated pending. Initial EKG showed Atrial Flutter with . After patient was given IV Cardizem repeated EKG showed Atrial flutter 4:1. Echo done last Wednesday at [facility] with preliminary report showing EF>50%. Chest xray showing cardiomegaly but no signs of acute infiltrates or edema. Patient will be admitted to the hospital for further management.    "

## 2023-08-16 NOTE — ED NOTES
Patient lying in bed. Denies pain or any needs at this time. Updated on room and will transfer once clean. Pt resting with equal unlabored breathing and NSR/ST

## 2023-08-16 NOTE — ED NOTES
Pt awake. Denies pain or any complaints at this time. NSR and no jpressure to chest noted. Family at bedside.

## 2023-08-16 NOTE — ED NOTES
Patient sitting up in chair due to his back hurting from lying in bed. Family at bedside. States his pain is 3/10 and chronic. Denies any needs at this time. Ice water provided. Call light within reach. Vss.

## 2023-08-16 NOTE — ED PROVIDER NOTES
Encounter Date: 8/15/2023    SCRIBE #1 NOTE: I, Isabella Parmar, am scribing for, and in the presence of,  Jacek Espitia MD. I have scribed the entire note.       History     Chief Complaint   Patient presents with    Tachycardia     Pt states he was d/c fron [facility] 8-9-23 for high heart rate - pt was seen today by cwebb for f/u - pt states he has been dealing with high heart rate for a week     47 y.o. male presents to the ED with c/o of tachycardia x 1 week, chest tightness, and intermittent SOB. Patient stated symptoms are without exertion. No other symptoms were reported.     The history is provided by the patient. No  was used.     Review of patient's allergies indicates:   Allergen Reactions    Pcn [penicillins]     Penicillin g potassium Rash and Nausea And Vomiting     Past Medical History:   Diagnosis Date    Allergy     Hypertension     NEGRITA (obstructive sleep apnea)     Paroxysmal atrial fibrillation     Psoriasis      Past Surgical History:   Procedure Laterality Date    ECHOCARDIOGRAM,TRANSESOPHAGEAL N/A 8/16/2023    Procedure: Transesophageal echo (JUVENAL) intra-procedure log documentation;  Surgeon: Cal Stock MD;  Location: [facility] CATH LAB;  Service: Cardiology;  Laterality: N/A;    RECONSTRUCTION OF CHEST WALL Bilateral     TREATMENT OF CARDIAC ARRHYTHMIA N/A 8/16/2023    Procedure: Cardioversion or Defibrillation;  Surgeon: Cal Stock MD;  Location: [facility] CATH LAB;  Service: Cardiology;  Laterality: N/A;    WRIST SURGERY Left     cyst removal     Family History   Problem Relation Age of Onset    Hypertension Mother     Hypertension Father     Diabetes Father     Heart failure Father      Social History     Tobacco Use    Smoking status: Never     Passive exposure: Never    Smokeless tobacco: Never   Substance Use Topics    Alcohol use: Never    Drug use: Never     Review of Systems   Constitutional: Negative.    HENT: Negative.     Eyes: Negative.     Respiratory:  Positive for chest tightness and shortness of breath.    Cardiovascular:         Tachycardia    Gastrointestinal: Negative.    Endocrine: Negative.    Genitourinary: Negative.    Musculoskeletal: Negative.    Skin: Negative.    Allergic/Immunologic: Negative.    Neurological: Negative.    Hematological: Negative.    Psychiatric/Behavioral: Negative.     All other systems reviewed and are negative.      Physical Exam     Initial Vitals [08/15/23 2150]   BP Pulse Resp Temp SpO2   (!) 152/101 (!) 147 20 99 °F (37.2 °C) 95 %      MAP       --         Physical Exam    Nursing note and vitals reviewed.  Constitutional:   BMI>30.    Neck:   Normal range of motion.  Cardiovascular:  Regular rhythm and normal heart sounds.   Tachycardia present.         Pulmonary/Chest: Breath sounds normal.   Abdominal: Abdomen is soft.   Musculoskeletal:         General: Normal range of motion.      Cervical back: Normal range of motion.     Neurological: He is alert and oriented to person, place, and time.   Skin: Skin is warm.         ED Course   Procedures  Labs Reviewed   COMPREHENSIVE METABOLIC PANEL - Abnormal; Notable for the following components:       Result Value    Chloride 110 (*)     ALT 72 (*)     All other components within normal limits   NT-PRO NATRIURETIC PEPTIDE - Abnormal; Notable for the following components:    ProBNP 1,245 (*)     All other components within normal limits   PROTIME-INR - Abnormal; Notable for the following components:    PT 14.8 (*)     All other components within normal limits   MAGNESIUM - Abnormal; Notable for the following components:    Magnesium 2.6 (*)     All other components within normal limits   CBC WITH DIFFERENTIAL - Abnormal; Notable for the following components:    Neutrophils % 69.5 (*)     Lymphocytes % 20.8 (*)     Monocytes % 6.6 (*)     All other components within normal limits   APTT - Normal   TROPONIN I - Normal   SARS-COV-2 RNA AMPLIFICATION, QUAL - Normal     Narrative:     Negative SARS-CoV results should not be used as the sole basis for treatment or patient management decisions; negative results should be considered in the context of a patient's recent exposures, history and the presene of clinical signs and symptoms consistent with COVID-19.  Negative results should be treated as presumptive and confirmed by molecular assay, if necessary for patient management.   TSH - Normal   TROPONIN I - Normal   CBC W/ AUTO DIFFERENTIAL    Narrative:     The following orders were created for panel order CBC auto differential.  Procedure                               Abnormality         Status                     ---------                               -----------         ------                     CBC with Differential[[id number]]        Abnormal            Final result                 Please view results for these tests on the individual orders.   HEMOGLOBIN A1C        ECG Results              EKG 12-lead (Final result)  Result time 08/22/23 04:26:50      Final result by Interface, Lab In [facility] (08/22/23 04:26:50)                   Narrative:    Test Reason : R00.0,    Vent. Rate : 074 BPM     Atrial Rate : 000 BPM     P-R Int : 000 ms          QRS Dur : 090 ms      QT Int : 392 ms       P-R-T Axes : 000 103 090 degrees     QTc Int : 417 ms    Atrial flutter with 4:1 A-V block  Rightward axis  Cannot rule out septal infarct - age undetermined  Abnormal ECG    Confirmed by Christina CERVANTES, Jas HENDERSON ([id number]) on 8/22/2023 4:26:45 AM    Referred By: [email or web address]   SELF           Confirmed By:Jas Vasquez MD                                     EKG 12-lead (Final result)  Result time 08/22/23 04:27:01      Final result by Interface, Lab In [facility] (08/22/23 04:27:01)                   Narrative:    Test Reason : R00.0,    Vent. Rate : 147 BPM     Atrial Rate : 000 BPM     P-R Int : 138 ms          QRS Dur : 092 ms      QT Int : 262 ms       P-R-T Axes : 076 118 062 degrees     QTc Int : 414  ms    Probable sinus tachycardia  Right axis deviation  Possible right ventricular hypertrophy  Septal T wave abnormality may be due to the hypertrophy and/or ischemia  Abnormal ECG    Confirmed by Jas Vasquez MD ([id number]) on 8/22/2023 4:26:52 AM    Referred By: [email or web address]   SELF           Confirmed By:Jas Vasquez MD                                  Imaging Results              X-Ray Chest AP Portable (Final result)  Result time 08/16/23 07:41:03      Final result by Figueroa Cote DO (08/16/23 07:41:03)                   Impression:      As above.    Point of Service: [facility]      Electronically signed by: Figueroa Cote  Date:    08/16/2023  Time:    07:41               Narrative:    EXAMINATION:  XR CHEST AP PORTABLE    CLINICAL HISTORY:  Tachycardia, unspecified    COMPARISON:  Chest x-ray November 3, 2017    TECHNIQUE:  Frontal view/views of the chest.    FINDINGS:  Mild-to-moderate cardiomegaly.  Hyperinflation of the lungs suspicious for COPD.  No markus pulmonary edema or focal consolidating pneumonia.  Visualized osseous and surrounding soft tissue structures appear grossly unchanged.                                       Medications   sodium chloride 0.9% infusion (has no administration in time range)   diltiaZEM injection 20 mg (20 mg Intravenous Given 8/15/23 2205)   sodium chloride 0.9% bolus 1,000 mL 1,000 mL (0 mLs Intravenous Stopped 8/15/23 2350)   diltiaZEM injection 20 mg (20 mg Intravenous Given 8/16/23 0119)   potassium chloride SA CR tablet 40 mEq (40 mEq Oral Given 8/17/23 0932)     Medical Decision Making:   Initial Assessment:   TACHYCARDIA.  INTERMITTENT CHEST TIGHTNESS.    Differential Diagnosis:   DDX:  ARRHYTHMIA VS SINUS TACHY VS OTHER  Clinical Tests:   Lab Tests: Ordered and Reviewed  Radiological Study: Ordered and Reviewed  Medical Tests: Ordered and Reviewed  ED Management:  DX:  ATRIAL FLUTTER.  ADMIT            Attending Attestation:           Physician Attestation for  Scribe:  Physician Attestation Statement for Scribe #1: I, Jacek Espitia MD, reviewed documentation, as scribed by Isabella Parmar in my presence, and it is both accurate and complete.                          Clinical Impression:   Final diagnoses:  [R00.0] Tachycardia  [I48.92] Atrial flutter  [I48.3] Typical atrial flutter [I48.3] (Primary)  [G47.33] NEGRITA (obstructive sleep apnea) [G47.33]  [I10] Essential hypertension [I10] (Chronic)  [R07.9] Chest pain, unspecified type [R07.9]        ED Disposition Condition    Admit                 Jacek Espitia MD  09/13/23 1488

## 2023-08-16 NOTE — SUBJECTIVE & OBJECTIVE
Past Medical History:   Diagnosis Date    Allergy     Hypertension     NEGRITA (obstructive sleep apnea)     Psoriasis        Past Surgical History:   Procedure Laterality Date    RECONSTRUCTION OF CHEST WALL Bilateral     WRIST SURGERY Left     cyst removal       Review of patient's allergies indicates:   Allergen Reactions    Pcn [penicillins]     Penicillin g potassium Rash and Nausea And Vomiting       No current facility-administered medications on file prior to encounter.     Current Outpatient Medications on File Prior to Encounter   Medication Sig    ELIQUIS 5 mg Tab Take 5 mg by mouth 2 (two) times daily.    fexofenadine (ALLEGRA) 180 MG tablet Take 180 mg by mouth once daily.    guselkumab (TREMFYA) 100 mg/mL AtIn 100mg SC x1 on wk 0, 4 then q8wks    metoprolol tartrate (LOPRESSOR) 25 MG tablet Take 25 mg by mouth 2 (two) times daily.    multivit-min-folic acid-vit K (MULTI FOR HIM, NO IRON,) 400-40 mcg Cap as directed Orally    naproxen sodium (ANAPROX) 220 MG tablet 1 tablet with food or milk as needed Orally every 12 hrs     Family History       Problem Relation (Age of Onset)    Diabetes Father    Heart failure Father    Hypertension Mother, Father          Tobacco Use    Smoking status: Never     Passive exposure: Never    Smokeless tobacco: Never   Substance and Sexual Activity    Alcohol use: Never    Drug use: Never    Sexual activity: Yes     Partners: Female     Review of Systems   Constitutional:  Negative for chills and fever.   HENT:  Negative for postnasal drip, rhinorrhea, sinus pressure, sneezing, sore throat and trouble swallowing.    Eyes:  Negative for photophobia and visual disturbance.   Respiratory:  Positive for chest tightness. Negative for cough, shortness of breath and wheezing.    Cardiovascular:  Negative for chest pain, palpitations and leg swelling.   Gastrointestinal:  Negative for abdominal distention, abdominal pain, constipation, diarrhea, nausea and vomiting.   Endocrine:  Negative for polydipsia, polyphagia and polyuria.   Genitourinary:  Negative for decreased urine volume, difficulty urinating, frequency and urgency.   Skin:  Negative for wound.   Neurological:  Positive for headaches. Negative for tremors, seizures, speech difficulty, weakness and numbness.   Psychiatric/Behavioral:  Negative for agitation and confusion. The patient is not nervous/anxious and is not hyperactive.      Objective:     Vital Signs (Most Recent):  Temp: 99 °F (37.2 °C) (08/15/23 2150)  Pulse: 87 (08/16/23 0120)  Resp: (!) 29 (08/16/23 0120)  BP: 107/71 (08/16/23 0050)  SpO2: (!) 93 % (08/16/23 0120) Vital Signs (24h Range):  Temp:  [97.8 °F (36.6 °C)-99 °F (37.2 °C)] 99 °F (37.2 °C)  Pulse:  [] 87  Resp:  [11-29] 29  SpO2:  [93 %-96 %] 93 %  BP: (107-152)/() 107/71     Weight: (!) 148.8 kg (328 lb)  Body mass index is 37.9 kg/m².     Physical Exam  Vitals and nursing note reviewed.   Constitutional:       General: He is not in acute distress.     Appearance: Normal appearance. He is obese. He is not ill-appearing, toxic-appearing or diaphoretic.   HENT:      Head: Normocephalic and atraumatic.      Right Ear: External ear normal.      Left Ear: External ear normal.      Nose: Nose normal. No congestion or rhinorrhea.      Mouth/Throat:      Mouth: Mucous membranes are moist.      Pharynx: Oropharynx is clear. No oropharyngeal exudate or posterior oropharyngeal erythema.   Eyes:      General: No scleral icterus.     Extraocular Movements: Extraocular movements intact.      Conjunctiva/sclera: Conjunctivae normal.      Pupils: Pupils are equal, round, and reactive to light.   Cardiovascular:      Rate and Rhythm: Tachycardia present. Rhythm irregular.      Pulses: Normal pulses.      Heart sounds: Normal heart sounds. No murmur heard.  Pulmonary:      Effort: Pulmonary effort is normal. No respiratory distress.      Breath sounds: No wheezing, rhonchi or rales.   Abdominal:      General:  Abdomen is flat. Bowel sounds are normal. There is no distension.      Palpations: Abdomen is soft.      Tenderness: There is no abdominal tenderness. There is no right CVA tenderness, left CVA tenderness, guarding or rebound.   Musculoskeletal:         General: Normal range of motion.      Cervical back: Neck supple. No rigidity or tenderness.      Right lower leg: Edema present.      Left lower leg: Edema present.   Skin:     General: Skin is warm and dry.      Capillary Refill: Capillary refill takes less than 2 seconds.      Coloration: Skin is not jaundiced.      Findings: No lesion or rash.   Neurological:      General: No focal deficit present.      Mental Status: He is alert and oriented to person, place, and time.      Cranial Nerves: No cranial nerve deficit.      Sensory: No sensory deficit.      Motor: No weakness.   Psychiatric:         Mood and Affect: Mood normal.         Behavior: Behavior normal.         Thought Content: Thought content normal.         Judgment: Judgment normal.              CRANIAL NERVES     CN III, IV, VI   Pupils are equal, round, and reactive to light.       Significant Labs: All pertinent labs within the past 24 hours have been reviewed.    Significant Imaging: I have reviewed all pertinent imaging results/findings within the past 24 hours.

## 2023-08-16 NOTE — ED NOTES
Pt HR on monitor was noticed at 130 bpm. Dr Durbin at bedside. Dr Espitia informed. New order for cardizem given. See MAR for details. Pt HR after cardizem is 74bpm

## 2023-08-17 VITALS
SYSTOLIC BLOOD PRESSURE: 127 MMHG | HEIGHT: 78 IN | DIASTOLIC BLOOD PRESSURE: 70 MMHG | BODY MASS INDEX: 36.45 KG/M2 | HEART RATE: 73 BPM | TEMPERATURE: 99 F | OXYGEN SATURATION: 94 % | WEIGHT: 315 LBS | RESPIRATION RATE: 20 BRPM

## 2023-08-17 PROBLEM — I48.91 ATRIAL FIBRILLATION WITH RVR: Chronic | Status: ACTIVE | Noted: 2023-08-17

## 2023-08-17 PROBLEM — E87.6 HYPOKALEMIA: Status: ACTIVE | Noted: 2023-08-17

## 2023-08-17 LAB
ALBUMIN SERPL BCP-MCNC: 3.4 G/DL (ref 3.5–5)
ALBUMIN/GLOB SERPL: 1.1 {RATIO}
ALP SERPL-CCNC: 57 U/L (ref 45–115)
ALT SERPL W P-5'-P-CCNC: 53 U/L (ref 16–61)
ANION GAP SERPL CALCULATED.3IONS-SCNC: 10 MMOL/L (ref 7–16)
AST SERPL W P-5'-P-CCNC: 25 U/L (ref 15–37)
BASOPHILS # BLD AUTO: 0.05 K/UL (ref 0–0.2)
BASOPHILS NFR BLD AUTO: 0.7 % (ref 0–1)
BILIRUB SERPL-MCNC: 0.7 MG/DL (ref ?–1.2)
BUN SERPL-MCNC: 13 MG/DL (ref 7–18)
BUN/CREAT SERPL: 14 (ref 6–20)
CALCIUM SERPL-MCNC: 8.5 MG/DL (ref 8.5–10.1)
CHLORIDE SERPL-SCNC: 104 MMOL/L (ref 98–107)
CO2 SERPL-SCNC: 29 MMOL/L (ref 21–32)
CREAT SERPL-MCNC: 0.94 MG/DL (ref 0.7–1.3)
DIFFERENTIAL METHOD BLD: ABNORMAL
EGFR (NO RACE VARIABLE) (RUSH/TITUS): 102 ML/MIN/1.73M2
EOSINOPHIL # BLD AUTO: 0.18 K/UL (ref 0–0.5)
EOSINOPHIL NFR BLD AUTO: 2.4 % (ref 1–4)
ERYTHROCYTE [DISTWIDTH] IN BLOOD BY AUTOMATED COUNT: 13.9 % (ref 11.5–14.5)
GLOBULIN SER-MCNC: 3 G/DL (ref 2–4)
GLUCOSE SERPL-MCNC: 88 MG/DL (ref 74–106)
HCT VFR BLD AUTO: 39.7 % (ref 40–54)
HGB BLD-MCNC: 13.3 G/DL (ref 13.5–18)
IMM GRANULOCYTES # BLD AUTO: 0.02 K/UL (ref 0–0.04)
IMM GRANULOCYTES NFR BLD: 0.3 % (ref 0–0.4)
LYMPHOCYTES # BLD AUTO: 2.01 K/UL (ref 1–4.8)
LYMPHOCYTES NFR BLD AUTO: 26.6 % (ref 27–41)
MAGNESIUM SERPL-MCNC: 2.2 MG/DL (ref 1.7–2.3)
MCH RBC QN AUTO: 30 PG (ref 27–31)
MCHC RBC AUTO-ENTMCNC: 33.5 G/DL (ref 32–36)
MCV RBC AUTO: 89.4 FL (ref 80–96)
MONOCYTES # BLD AUTO: 0.53 K/UL (ref 0–0.8)
MONOCYTES NFR BLD AUTO: 7 % (ref 2–6)
MPC BLD CALC-MCNC: 10 FL (ref 9.4–12.4)
NEUTROPHILS # BLD AUTO: 4.78 K/UL (ref 1.8–7.7)
NEUTROPHILS NFR BLD AUTO: 63 % (ref 53–65)
NRBC # BLD AUTO: 0 X10E3/UL
NRBC, AUTO (.00): 0 %
PLATELET # BLD AUTO: 219 K/UL (ref 150–400)
POTASSIUM SERPL-SCNC: 3.1 MMOL/L (ref 3.5–5.1)
PROT SERPL-MCNC: 6.4 G/DL (ref 6.4–8.2)
RBC # BLD AUTO: 4.44 M/UL (ref 4.6–6.2)
SODIUM SERPL-SCNC: 140 MMOL/L (ref 136–145)
WBC # BLD AUTO: 7.57 K/UL (ref 4.5–11)

## 2023-08-17 PROCEDURE — 25000003 PHARM REV CODE 250: Performed by: FAMILY MEDICINE

## 2023-08-17 PROCEDURE — 83735 ASSAY OF MAGNESIUM: CPT | Performed by: INTERNAL MEDICINE

## 2023-08-17 PROCEDURE — 99239 PR HOSPITAL DISCHARGE DAY,>30 MIN: ICD-10-PCS | Mod: GC,,, | Performed by: FAMILY MEDICINE

## 2023-08-17 PROCEDURE — 99232 SBSQ HOSP IP/OBS MODERATE 35: CPT | Mod: ,,, | Performed by: REGISTERED NURSE

## 2023-08-17 PROCEDURE — 25000003 PHARM REV CODE 250: Performed by: REGISTERED NURSE

## 2023-08-17 PROCEDURE — 99239 HOSP IP/OBS DSCHRG MGMT >30: CPT | Mod: GC,,, | Performed by: FAMILY MEDICINE

## 2023-08-17 PROCEDURE — 99232 PR SUBSEQUENT HOSPITAL CARE,LEVL II: ICD-10-PCS | Mod: ,,, | Performed by: REGISTERED NURSE

## 2023-08-17 PROCEDURE — 25000003 PHARM REV CODE 250: Performed by: GENERAL PRACTICE

## 2023-08-17 PROCEDURE — 85025 COMPLETE CBC W/AUTO DIFF WBC: CPT | Performed by: INTERNAL MEDICINE

## 2023-08-17 PROCEDURE — 80053 COMPREHEN METABOLIC PANEL: CPT | Performed by: INTERNAL MEDICINE

## 2023-08-17 RX ORDER — POTASSIUM CHLORIDE 20 MEQ/1
40 TABLET, EXTENDED RELEASE ORAL ONCE
Status: COMPLETED | OUTPATIENT
Start: 2023-08-17 | End: 2023-08-17

## 2023-08-17 RX ORDER — METOPROLOL TARTRATE 50 MG/1
50 TABLET ORAL 2 TIMES DAILY
Qty: 60 TABLET | Refills: 0 | Status: SHIPPED | OUTPATIENT
Start: 2023-08-17 | End: 2023-09-08 | Stop reason: ALTCHOICE

## 2023-08-17 RX ADMIN — METOPROLOL TARTRATE 50 MG: 50 TABLET, FILM COATED ORAL at 09:08

## 2023-08-17 RX ADMIN — POTASSIUM CHLORIDE 40 MEQ: 1500 TABLET, EXTENDED RELEASE ORAL at 09:08

## 2023-08-17 RX ADMIN — APIXABAN 5 MG: 5 TABLET, FILM COATED ORAL at 09:08

## 2023-08-17 NOTE — DISCHARGE SUMMARY
"[facility] Hospital Medicine  Discharge Summary      Patient Name: Jason Chaudhary  MRN: [id number]  ULICES: [id number]  Patient Class: IP- Inpatient  Admission Date: 8/15/2023  Hospital Length of Stay: 1 days  Discharge Date and Time:  08/17/2023 1:22 PM  Attending Physician: Eloisa Garcia DO   Discharging Provider: Ora Hendrickson MD  Primary Care Provider: Spike Johnson MD    Primary Care Team: Networked reference to record PCT     HPI:   Patient is a 48 y/o male who presents to the ED with complaint of tachycardia, chest tightness and SOB that started 1 week ago. Patient states that one day after the symptoms started he saw his PCP, Dr. Johnson and patient was recommended go to the hospital due to EKG changes. Patient presented to [facility] and based on the hospital discharge summary, he was discharged on Eliquis and BB and an appointment was set up with cardiology. Patient states that since he was discharged from the hospital he had few other episodes of chest tightness that last for about 30 minutes. Patient reports that yesterday he went to see his PCP for his f/u appointment and had another episode that he describes as chest tightness, "hot flashes" or pressure inside his chest with SOB. Patient with PMHx of HTN, psoriasis and NEGRITA currently on CPAP at home. 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 Patient denies nausea, vomiting, diaphoresis, fever, chills, urinary or bowel habit changes.    In the ED vital signs showed /101, , RR 20, SpO2 95%, afebrile. Blood work showed CBC unremarkable, slightly elevated ALT of 72. p-BNP 1245. Troponin 7.5, repeated pending. Initial EKG showed Atrial Flutter with . After patient was given IV Cardizem repeated EKG showed Atrial flutter 4:1. Echo done last Wednesday at [facility] with preliminary report showing EF>50%. " Chest xray showing cardiomegaly but no signs of acute infiltrates or edema. Patient will be admitted to the hospital for further management.        Procedure(s) (LRB):  Transesophageal echo (JUVENAL) intra-procedure log documentation (N/A)  Cardioversion or Defibrillation (N/A)      Hospital Course:   46 yo with new onset afib/flutter with rapid rate.  Patient had normal trops without significant delta change and EKG done at [facility] showed prelim report with EF greater than 50%.   He responded well to cardizem for immediate rate control but did not remain in a controlled rate.  Dami had JUVENAL with Cardioversion  on 8/16 at [facility] and has remained in a Sinus rhythm > 12 hours.     Pt was placed on Eliquis 5 mg BID, Metoprolol tartrate 50 mg BID 2/2 paroxysmal Afib/Aflutter. Pt will need follow up with Cardiology and PCP within 1 week.     Pt has received maximum benefit of acute hospital stay and will be discharged home in stable condition.       Goals of Care Treatment Preferences:  Code Status: Full Code      Consults:   Consults (From admission, onward)        Status Ordering Provider     Inpatient consult to Cardiology  Once        Provider:  Cal Stock MD    Completed DANNY GALAN          Cardiac/Vascular  * Atrial flutter  Patient with new onset of Atrial Flutter seen at [facility] last week  Recently started on home BB and Eliquis  Initial EKG showed Atrial Flutter   IV Cardizem was given in ED resulting in rate control  Repeated EKG showed Atrial flutter 4:1  Echo done last Wednesday at [facility] with preliminary EF>50%  Continue BB and Eliquis    8/16/23 Cardioversion and JUVENAL  Procedure was successful Pt converted to NSR.  8/16/23 Pt will be discharged on BB, Eliquis      Chest pain  reesolved      Essential hypertension  Home HCTZ was stopped about 1 week ago due to low BP  Monitor BP   Patient placed on Metoprolol tartrate 50 mg PO BID will continue for rate control as well    Other  NEGRITA  (obstructive sleep apnea)  Continue home CPAP QHS        Final Active Diagnoses:    Diagnosis Date Noted POA    PRINCIPAL PROBLEM:  Atrial flutter [I48.92] 08/08/2023 Yes    NEGRITA (obstructive sleep apnea) [G47.33] 08/16/2023 Yes    Chest pain [R07.9] 08/16/2023 Unknown    Essential hypertension [I10] 09/16/2021 Yes     Chronic      Problems Resolved During this Admission:       Discharged Condition: good    Disposition: Home or Self Care    Follow Up:   Follow-up Information     Cal Stock MD Follow up in 1 week(s).    Specialty: Cardiology  Contact information:  [address] [facility] [place] [address]  [phone]             Spike Johnson MD Follow up in 2 week(s).    Specialties: Family Medicine, Emergency Medicine  Contact information:  [address]  [facility] [place] [address]  [phone]                       Patient Instructions:   No discharge procedures on file.    Significant Diagnostic Studies: Labs: All labs within the past 24 hours have been reviewed    Pending Diagnostic Studies:     Procedure Component Value Units Date/Time    EKG 12-lead [[id number]]     Order Status: Sent Lab Status: No result     Transesophageal echo (JUVENAL) with possible cardioversion [[id number]] Resulted: 08/16/23 1501    Order Status: Sent Lab Status: In process Updated: 08/16/23 1507     BSA 2.86 m2          Medications:  Reconciled Home Medications:      Medication List      CHANGE how you take these medications    metoprolol tartrate 50 MG tablet  Commonly known as: LOPRESSOR  Take 1 tablet (50 mg total) by mouth 2 (two) times daily.  What changed:   · medication strength  · how much to take        CONTINUE taking these medications    ELIQUIS 5 mg Tab  Generic drug: apixaban  Take 5 mg by mouth 2 (two) times daily.     fexofenadine 180 MG tablet  Commonly known as: ALLEGRA  Take 180 mg by mouth once daily.     MULTI FOR HIM (NO IRON) 400-40 mcg Cap  Generic drug: multivit-min-folic  acid-vit K  as directed Orally     naproxen sodium 220 MG tablet  Commonly known as: ANAPROX  1 tablet with food or milk as needed Orally every 12 hrs     TREMFYA 100 mg/mL Atin  Generic drug: guselkumab  100mg SC x1 on wk 0, 4 then q8wks            Indwelling Lines/Drains at time of discharge:   Lines/Drains/Airways     None                 Time spent on the discharge of patient: 45 minutes         Ora Hendrickson MD  Department of Hospital Medicine  [facility]

## 2023-08-17 NOTE — HOSPITAL COURSE
46 yo with new onset afib/flutter with rapid rate.  Patient had normal trops without significant delta change and EKG done at [facility] showed prelim report with EF greater than 50%.   He responded well to cardizem for immediate rate control but did not remain in a controlled rate.  Dami had JUVENAL with Cardioversion  on 8/16 at [facility] and has remained in a Sinus rhythm > 12 hours.     Pt was placed on Eliquis 5 mg BID, Metoprolol tartrate 50 mg BID 2/2 paroxysmal Afib/Aflutter. Pt will need follow up with Cardiology and PCP within 1 week.     Pt has received maximum benefit of acute hospital stay and will be discharged home in stable condition.

## 2023-08-17 NOTE — PLAN OF CARE
Problem: Adult Inpatient Plan of Care  Goal: Plan of Care Review  Outcome: Ongoing, Progressing  Flowsheets (Taken 8/16/2023 2155)  Plan of Care Reviewed With: patient  Goal: Optimal Comfort and Wellbeing  Outcome: Ongoing, Progressing  Intervention: Monitor Pain and Promote Comfort  Flowsheets (Taken 8/16/2023 2155)  Pain Management Interventions:   position adjusted   pain management plan reviewed with patient/caregiver   pillow support provided   quiet environment facilitated   relaxation techniques promoted  Intervention: Provide Person-Centered Care  Flowsheets (Taken 8/16/2023 2155)  Trust Relationship/Rapport:   care explained   emotional support provided   choices provided   empathic listening provided   questions answered   thoughts/feelings acknowledged   reassurance provided   questions encouraged

## 2023-08-17 NOTE — PROGRESS NOTES
[facility]  Cardiology  Progress Note    Patient Name: Jason Chaudhary  MRN: [id number]  Admission Date: 8/15/2023  Hospital Length of Stay: 1 days  Code Status: Full Code   Attending Physician: Eloisa Garcia DO   Primary Care Physician: Spike Johnson MD  Expected Discharge Date: 8/17/2023  Principal Problem:Atrial flutter    Subjective:     Hospital Course:   No notes on file    Interval History: No acute events overnight. Denies chest pain or dyspnea     Review of Systems   Constitutional: Negative for chills and malaise/fatigue.   Cardiovascular:  Negative for irregular heartbeat, leg swelling, near-syncope, orthopnea and palpitations.        Chest tightness   Respiratory:  Negative for shortness of breath.    All other systems reviewed and are negative.    Objective:     Vital Signs (Most Recent):  Temp: 98.6 °F (37 °C) (08/17/23 1204)  Pulse: 73 (08/17/23 1204)  Resp: 20 (08/17/23 1204)  BP: 127/70 (08/17/23 1204)  SpO2: (!) 94 % (08/17/23 1204) Vital Signs (24h Range):  Temp:  [97.6 °F (36.4 °C)-98.6 °F (37 °C)] 98.6 °F (37 °C)  Pulse:  [] 73  Resp:  [12-28] 20  SpO2:  [94 %-97 %] 94 %  BP: (111-142)/(59-90) 127/70     Weight: (!) 148.8 kg (328 lb)  Body mass index is 37.9 kg/m².     SpO2: (!) 94 %       No intake or output data in the 24 hours ending 08/17/23 1559    Lines/Drains/Airways       None                      Physical Exam  Vitals reviewed.   Constitutional:       General: He is not in acute distress.  HENT:      Head: Normocephalic and atraumatic.      Mouth/Throat:      Mouth: Mucous membranes are moist.   Eyes:      Extraocular Movements: Extraocular movements intact.      Conjunctiva/sclera: Conjunctivae normal.   Cardiovascular:      Rate and Rhythm: Normal rate and regular rhythm.      Pulses: Normal pulses.      Heart sounds: Normal heart sounds.   Pulmonary:      Effort: Pulmonary effort is normal.      Breath sounds: Normal  breath sounds.   Abdominal:      General: Bowel sounds are normal. There is no distension.      Palpations: Abdomen is soft.      Tenderness: There is no abdominal tenderness.   Musculoskeletal:         General: No swelling. Normal range of motion.      Cervical back: Normal range of motion and neck supple.   Skin:     General: Skin is warm and dry.      Capillary Refill: Capillary refill takes less than 2 seconds.   Neurological:      General: No focal deficit present.      Mental Status: He is alert and oriented to person, place, and time.      Cranial Nerves: No cranial nerve deficit.      Sensory: No sensory deficit.   Psychiatric:         Mood and Affect: Mood normal.         Behavior: Behavior normal.            Significant Labs: All pertinent lab results from the last 24 hours have been reviewed.    Significant Imaging: Echocardiogram: Transthoracic echo (TTE) complete (Cupid Only): No results found for this or any previous visit.    Assessment and Plan:     Brief HPI: 46 y/o male who is seen in consult today for PAF after admission overnight. He presented to the ED c/o of chest tightness, dyspnea and palpitations. Symptoms orginally started about one week ago and he was seen at [facility] ED were he was rate controlled and placed on Eliquis. He was scheduled for a pending cardiology appointment. After discharge he had several episodes of of the above mentioned symptoms. Yesterday he had a follow up with his PCP and again had chest tightness, dyspnea, flushing and was sent to the ED for evaluation. Patient denies alcohol or tobacco use.      Workup: CBC unremarkable, slightly elevated ALT of 72. p-BNP 1245. Troponin 7.5 --> 8. EKG: Atrial Flutter with . IV Cardizem in ED repeated EKG showed Atrial flutter 4:1. Echo done last Wednesday at [facility] with preliminary report showing EF>50%. Chest xray showing cardiomegaly but no signs of acute infiltrates or edema     Past Medical History: NEGRITA  (Compliant with Cpap), HTN, psoriasis.       * Atrial flutter  8/16/23  Patient with Paroxysmal (<7 days) atrial flutter which is uncontrolled currently with Beta Blocker. [id number] Score: 1. HASBLED Score: 1.  Anticoagulation done with Eliquis 5 mg bid    - Patient seen and evaluated by Dr. Stock   - Troponin 7.5 --> 8 - EKG: Aflutter  W/o ischemic changes   - Echo: EF 50-55% - Lipid panel (low HDL) - TSH (wnl)  - Lopressor 50 mg bid   - Educate on medication adherence, blood pressure control, stress reduction, cholesterol, smoking cessation, tobacco use, weight management, diet, diabetes, physical activity risk factor modifications    - JUVENAL/Cardioversion in today. risk benefit discussed and consent signed   - No electrolyte derangements - monitor and replace as needed.     8/17/23  - s/p successful JUVENAL/Cardioversion. SR on Tele  - Continue Eliquis and Lopressor 50 mg bid  - Cardiology will sign off at this time. Thank you for consulting. Call with questions or concerns.   - Follow up with Cardiology as scheduled.   - Echo:    Left Ventricle: Not well visualized due to poor sonic window. Normal wall thickness. Normal wall motion. There is low normal systolic function with a visually estimated ejection fraction of 50 - 55%. Unable to assess diastolic functionUnable to assess due to poor image quality.    Left Atrium: Left atrium was not well visualized.    Right Ventricle: Right ventricle was not well visualized due to poor acoustic window.    IVC/SVC: Normal venous pressure at 3 mmHg    NEGRITA (obstructive sleep apnea)  8/16/23  - Compliant with Cpap    Essential hypertension  8/16/23  - Monitor  - HCTZ recently stopped due to hypotension    Hypokalemia  8/17/23  - Potassium 3.1  - replaced with 40 meq po  - MG wnl    VTE Risk Mitigation (From admission, onward)           Ordered     apixaban tablet 5 mg  2 times daily         08/16/23 0057     Place ABRAHAM hose  Until discontinued         08/16/23 1014                     Antony Renteria, [facility]  Cardiology  [facility] - [address] [place] [address]

## 2023-08-17 NOTE — ASSESSMENT & PLAN NOTE
Home HCTZ was stopped about 1 week ago due to low BP  Monitor BP   Patient placed on Metoprolol tartrate 50 mg PO BID will continue for rate control as well

## 2023-08-17 NOTE — ASSESSMENT & PLAN NOTE
Patient with new onset of Atrial Flutter seen at [facility] last week  Recently started on home BB and Eliquis  Initial EKG showed Atrial Flutter   IV Cardizem was given in ED resulting in rate control  Repeated EKG showed Atrial flutter 4:1  Echo done last Wednesday at [facility] with preliminary EF>50%  Continue BB and Eliquis    8/16/23 Cardioversion and JUVENAL  Procedure was successful Pt converted to NSR.  8/16/23 Pt will be discharged on BB, Eliquis

## 2023-08-17 NOTE — ASSESSMENT & PLAN NOTE
8/16/23  Patient with Paroxysmal (<7 days) atrial flutter which is uncontrolled currently with Beta Blocker. [id number] Score: 1. HASBLED Score: 1.  Anticoagulation done with Eliquis 5 mg bid    - Patient seen and evaluated by Dr. Stock   - Troponin 7.5 --> 8 - EKG: Aflutter  W/o ischemic changes   - Echo: EF 50-55% - Lipid panel (low HDL) - TSH (wnl)  - Lopressor 50 mg bid   - Educate on medication adherence, blood pressure control, stress reduction, cholesterol, smoking cessation, tobacco use, weight management, diet, diabetes, physical activity risk factor modifications    - JUVENAL/Cardioversion in today. risk benefit discussed and consent signed   - No electrolyte derangements - monitor and replace as needed.     8/17/23  - s/p successful JUVENAL/Cardioversion. SR on Tele  - Continue Eliquis and Lopressor 50 mg bid  - Cardiology will sign off at this time. Thank you for consulting. Call with questions or concerns.   - Follow up with Cardiology as scheduled.   - Echo:    Left Ventricle: Not well visualized due to poor sonic window. Normal wall thickness. Normal wall motion. There is low normal systolic function with a visually estimated ejection fraction of 50 - 55%. Unable to assess diastolic functionUnable to assess due to poor image quality.    Left Atrium: Left atrium was not well visualized.    Right Ventricle: Right ventricle was not well visualized due to poor acoustic window.    IVC/SVC: Normal venous pressure at 3 mmHg

## 2023-08-17 NOTE — SUBJECTIVE & OBJECTIVE
Interval History: No acute events overnight. Denies chest pain or dyspnea     Review of Systems   Constitutional: Negative for chills and malaise/fatigue.   Cardiovascular:  Negative for irregular heartbeat, leg swelling, near-syncope, orthopnea and palpitations.        Chest tightness   Respiratory:  Negative for shortness of breath.    All other systems reviewed and are negative.    Objective:     Vital Signs (Most Recent):  Temp: 98.6 °F (37 °C) (08/17/23 1204)  Pulse: 73 (08/17/23 1204)  Resp: 20 (08/17/23 1204)  BP: 127/70 (08/17/23 1204)  SpO2: (!) 94 % (08/17/23 1204) Vital Signs (24h Range):  Temp:  [97.6 °F (36.4 °C)-98.6 °F (37 °C)] 98.6 °F (37 °C)  Pulse:  [] 73  Resp:  [12-28] 20  SpO2:  [94 %-97 %] 94 %  BP: (111-142)/(59-90) 127/70     Weight: (!) 148.8 kg (328 lb)  Body mass index is 37.9 kg/m².     SpO2: (!) 94 %       No intake or output data in the 24 hours ending 08/17/23 1559    Lines/Drains/Airways       None                      Physical Exam  Vitals reviewed.   Constitutional:       General: He is not in acute distress.  HENT:      Head: Normocephalic and atraumatic.      Mouth/Throat:      Mouth: Mucous membranes are moist.   Eyes:      Extraocular Movements: Extraocular movements intact.      Conjunctiva/sclera: Conjunctivae normal.   Cardiovascular:      Rate and Rhythm: Normal rate and regular rhythm.      Pulses: Normal pulses.      Heart sounds: Normal heart sounds.   Pulmonary:      Effort: Pulmonary effort is normal.      Breath sounds: Normal breath sounds.   Abdominal:      General: Bowel sounds are normal. There is no distension.      Palpations: Abdomen is soft.      Tenderness: There is no abdominal tenderness.   Musculoskeletal:         General: No swelling. Normal range of motion.      Cervical back: Normal range of motion and neck supple.   Skin:     General: Skin is warm and dry.      Capillary Refill: Capillary refill takes less than 2 seconds.   Neurological:       General: No focal deficit present.      Mental Status: He is alert and oriented to person, place, and time.      Cranial Nerves: No cranial nerve deficit.      Sensory: No sensory deficit.   Psychiatric:         Mood and Affect: Mood normal.         Behavior: Behavior normal.            Significant Labs: All pertinent lab results from the last 24 hours have been reviewed.    Significant Imaging: Echocardiogram: Transthoracic echo (TTE) complete (Cupid Only): No results found for this or any previous visit.

## 2023-08-18 ENCOUNTER — PATIENT MESSAGE (OUTPATIENT)
Dept: CARDIOLOGY | Facility: CLINIC | Age: 48
End: 2023-08-18
Payer: COMMERCIAL

## 2023-08-18 NOTE — PLAN OF CARE
[facility] - [facility] Telemetry  Discharge Final Note    Primary Care Provider: Spike Johnson MD    Expected Discharge Date: 8/17/2023    Final Discharge Note (most recent)       Final Note - 08/18/23 1027          Final Note    Assessment Type Final Discharge Note     Anticipated Discharge Disposition Home or Self Care     What phone number can be called within the next 1-3 days to see how you are doing after discharge? [phone]        Post-Acute Status    Discharge Delays None known at this time                     Contact Cal Marino MD   Specialty: Cardiology    [address] [place] [address] [place] [address]   Phone: [phone]       Next Steps: Follow up on 9/8/2023    Instructions: 9:00 am    Spike Johnson MD   Specialty: Family Medicine, Emergency Medicine   Relationship: PCP - General    [address] [facility] [place] [address]   Phone: [phone]       Next Steps: Follow up in 2 week(s)    Instructions: @9:00AM on 8/31/23          Pt dc home with selfcare. 0 dc needs noted.

## 2023-08-23 ENCOUNTER — HOSPITAL ENCOUNTER (OUTPATIENT)
Dept: GASTROENTEROLOGY | Facility: HOSPITAL | Age: 48
Discharge: HOME | End: 2023-08-23
Attending: INTERNAL MEDICINE
Payer: COMMERCIAL

## 2023-08-23 ENCOUNTER — ANESTHESIA EVENT (OUTPATIENT)
Dept: GASTROENTEROLOGY | Facility: HOSPITAL | Age: 48
End: 2023-08-23
Payer: COMMERCIAL

## 2023-08-23 ENCOUNTER — ANESTHESIA (OUTPATIENT)
Dept: GASTROENTEROLOGY | Facility: HOSPITAL | Age: 48
End: 2023-08-23
Payer: COMMERCIAL

## 2023-08-23 VITALS
HEART RATE: 58 BPM | SYSTOLIC BLOOD PRESSURE: 124 MMHG | OXYGEN SATURATION: 94 % | RESPIRATION RATE: 18 BRPM | DIASTOLIC BLOOD PRESSURE: 71 MMHG

## 2023-08-23 DIAGNOSIS — Z12.11 SCREENING FOR COLON CANCER: ICD-10-CM

## 2023-08-23 PROCEDURE — 37000008 HC ANESTHESIA 1ST 15 MINUTES

## 2023-08-23 PROCEDURE — G0121 COLON CA SCRN NOT HI RSK IND: HCPCS | Performed by: INTERNAL MEDICINE

## 2023-08-23 PROCEDURE — G0121 COLON CA SCRN NOT HI RSK IND: HCPCS | Mod: ,,, | Performed by: INTERNAL MEDICINE

## 2023-08-23 PROCEDURE — 63600175 PHARM REV CODE 636 W HCPCS: Performed by: NURSE ANESTHETIST, CERTIFIED REGISTERED

## 2023-08-23 PROCEDURE — D9220A PRA ANESTHESIA: Mod: ,,, | Performed by: NURSE ANESTHETIST, CERTIFIED REGISTERED

## 2023-08-23 PROCEDURE — 27000284 HC CANNULA NASAL: Performed by: NURSE ANESTHETIST, CERTIFIED REGISTERED

## 2023-08-23 PROCEDURE — 25000003 PHARM REV CODE 250: Performed by: NURSE ANESTHETIST, CERTIFIED REGISTERED

## 2023-08-23 PROCEDURE — G0121 COLON CA SCRN NOT HI RSK IND: ICD-10-PCS | Mod: ,,, | Performed by: INTERNAL MEDICINE

## 2023-08-23 PROCEDURE — D9220A PRA ANESTHESIA: ICD-10-PCS | Mod: ,,, | Performed by: NURSE ANESTHETIST, CERTIFIED REGISTERED

## 2023-08-23 RX ORDER — SODIUM CHLORIDE 0.9 % (FLUSH) 0.9 %
10 SYRINGE (ML) INJECTION
Status: DISCONTINUED | OUTPATIENT
Start: 2023-08-23 | End: 2023-08-24 | Stop reason: HOSPADM

## 2023-08-23 RX ORDER — LIDOCAINE HYDROCHLORIDE 20 MG/ML
INJECTION, SOLUTION EPIDURAL; INFILTRATION; INTRACAUDAL; PERINEURAL
Status: DISCONTINUED | OUTPATIENT
Start: 2023-08-23 | End: 2023-08-23

## 2023-08-23 RX ORDER — PROPOFOL 10 MG/ML
INJECTION, EMULSION INTRAVENOUS
Status: DISCONTINUED | OUTPATIENT
Start: 2023-08-23 | End: 2023-08-23

## 2023-08-23 RX ADMIN — PROPOFOL 100 MG: 10 INJECTION, EMULSION INTRAVENOUS at 11:08

## 2023-08-23 RX ADMIN — LIDOCAINE HYDROCHLORIDE 40 MG: 20 INJECTION, SOLUTION INTRAVENOUS at 11:08

## 2023-08-23 RX ADMIN — SODIUM CHLORIDE: 9 INJECTION, SOLUTION INTRAVENOUS at 11:08

## 2023-08-23 RX ADMIN — PROPOFOL 40 MG: 10 INJECTION, EMULSION INTRAVENOUS at 11:08

## 2023-08-23 NOTE — DISCHARGE INSTRUCTIONS
Procedure Date  8/23/23     Impression  Overall Impression:   No colon polyps were seen. Pre-diverticular spasm was noted in the sigmoid and descending colon.        Recommendation    Repeat screening colonoscopy in 10 years      High fiber diet  Discharge:  Disp: DC to home. Resume diet. No driving x 24 hours. F/U with PCP as scheduled.  Dx: No colon polyps were seen on this exam.    NO DRIVING, OPERATING EQUIPMENT, OR SIGNING LEGAL DOCUMENTS FOR 24 HOURS.

## 2023-08-23 NOTE — ANESTHESIA PREPROCEDURE EVALUATION
"                                                                                                             08/23/2023  Jason Chaudhary is a 47 y.o., male.      Pre-op Assessment    I have reviewed the Patient Summary Reports.     I have reviewed the Nursing Notes. I have reviewed the NPO Status.   I have reviewed the Medications.     Review of Systems  Anesthesia Hx:  No problems with previous Anesthesia    Cardiovascular:   Hypertension Dysrhythmias atrial fibrillation    Pulmonary:   Sleep Apnea    Musculoskeletal:   Arthritis     Endocrine:  Obesity / BMI > 30      Physical Exam  General: Alert and Oriented    Airway:  Mallampati: II   Mouth Opening: Normal  TM Distance: Normal  Tongue: Normal  Neck ROM: Normal ROM    Dental:  Intact        Anesthesia Plan  Type of Anesthesia, risks & benefits discussed:    Anesthesia Type: Gen Natural Airway  Intra-op Monitoring Plan: Standard ASA Monitors  Post Op Pain Control Plan: multimodal analgesia  Induction:  IV  Informed Consent: Informed consent signed with the Patient and all parties understand the risks and agree with anesthesia plan.  All questions answered. Patient consented to blood products? Yes  ASA Score: 3  Day of Surgery Review of History & Physical: H&P Update referred to the surgeon/provider.  Anesthesia Plan Notes: Pt states he came to the Emergency Department 8/15/23 for "fast heart rate."  EKG showed sinus tach, second EKG showed atrial flutter.  Pt states he was "shocked" back into regular rhythm, meds were changed and he was discharged from ED.  Currently denies chest pain/SOB/[place].  Current HR is 67 and appears to be NSR on transport monitor.  Dr. Taylor aware.    Ready For Surgery From Anesthesia Perspective.     .      "

## 2023-08-23 NOTE — H&P
[facility] - Endoscopy  Gastroenterology  H&P    Patient Name: Jason Chaudhary  MRN: [id number]  Admission Date: 8/23/2023  Code Status: Prior    Attending Provider: Luciano Taylor MD   Primary Care Physician: Spike Johnson MD  Principal Problem:<principal problem not specified>    Subjective:     History of Present Illness: Pt presents for screening colonoscopy and no prior colonoscopy report is on the chart.    Past Medical History:   Diagnosis Date    Allergy     Hypertension     NEGRITA (obstructive sleep apnea)     Psoriasis        Past Surgical History:   Procedure Laterality Date    ECHOCARDIOGRAM,TRANSESOPHAGEAL N/A 8/16/2023    Procedure: Transesophageal echo (JUVENAL) intra-procedure log documentation;  Surgeon: Cal Stock MD;  Location: [facility] CATH LAB;  Service: Cardiology;  Laterality: N/A;    RECONSTRUCTION OF CHEST WALL Bilateral     TREATMENT OF CARDIAC ARRHYTHMIA N/A 8/16/2023    Procedure: Cardioversion or Defibrillation;  Surgeon: Cal Stock MD;  Location: [facility] CATH LAB;  Service: Cardiology;  Laterality: N/A;    WRIST SURGERY Left     cyst removal       Review of patient's allergies indicates:   Allergen Reactions    Pcn [penicillins]     Penicillin g potassium Rash and Nausea And Vomiting     Family History       Problem Relation (Age of Onset)    Diabetes Father    Heart failure Father    Hypertension Mother, Father          Tobacco Use    Smoking status: Never     Passive exposure: Never    Smokeless tobacco: Never   Substance and Sexual Activity    Alcohol use: Never    Drug use: Never    Sexual activity: Yes     Partners: Female     Review of Systems   Respiratory: Negative.     Cardiovascular: Negative.    Gastrointestinal: Negative.      Objective:     Vital Signs (Most Recent):  Pulse: 60 (08/23/23 1055)  Resp: 11 (08/23/23 1055)  BP: 132/79 (08/23/23 1055)  SpO2: (!) 94 % (08/23/23 1055) Vital Signs (24h Range):  Pulse:  [60] 60  Resp:  [11] 11  SpO2:  [94 %] 94  "%  BP: (132)/(79) 132/79        There is no height or weight on file to calculate BMI.    No intake or output data in the 24 hours ending 08/23/23 1138    Lines/Drains/Airways       Peripheral Intravenous Line  Duration                  Peripheral IV - Single Lumen 08/23/23 1054 22 G Anterior;Proximal;Right Forearm <1 day                    Physical Exam  Vitals reviewed.   Constitutional:       General: He is not in acute distress.     Appearance: Normal appearance. He is well-developed. He is obese. He is not ill-appearing.   HENT:      Head: Normocephalic and atraumatic.      Nose: Nose normal.   Eyes:      Pupils: Pupils are equal, round, and reactive to light.   Cardiovascular:      Rate and Rhythm: Normal rate and regular rhythm.   Pulmonary:      Effort: Pulmonary effort is normal.      Breath sounds: Normal breath sounds. No wheezing.   Abdominal:      General: Abdomen is flat. Bowel sounds are normal. There is no distension.      Palpations: Abdomen is soft.      Tenderness: There is no abdominal tenderness. There is no guarding.   Skin:     General: Skin is warm and dry.      Coloration: Skin is not jaundiced.   Neurological:      Mental Status: He is alert.   Psychiatric:         Attention and Perception: Attention normal.         Mood and Affect: Affect normal.         Speech: Speech normal.         Behavior: Behavior is cooperative.      Comments: Pt was calm while speaking.         Significant Labs:  CBC: No results for input(s): "WBC", "HGB", "HCT", "PLT" in the last 48 hours.  CMP: No results for input(s): "GLU", "CALCIUM", "ALBUMIN", "PROT", "NA", "K", "CO2", "CL", "BUN", "CREATININE", "ALKPHOS", "ALT", "AST", "BILITOT" in the last 48 hours.    Significant Imaging:  Imaging results within the past 24 hours have been reviewed.    Assessment/Plan:     There are no hospital problems to display for this patient.        Imp: screening for colon neoplasm  Plan: colonoscopy    Luciano Taylor, " MD  Gastroenterology  [facility] - Endoscopy

## 2023-08-23 NOTE — ANESTHESIA POSTPROCEDURE EVALUATION
Anesthesia Post Evaluation    Patient: Jason Chaudhary    Procedure(s) Performed: * No procedures listed *    Final Anesthesia Type: general      Patient location during evaluation: PACU  Patient participation: Yes- Able to Participate  Level of consciousness: awake and alert and oriented  Post-procedure vital signs: reviewed and stable  Pain management: adequate  Airway patency: patent    PONV status at discharge: No PONV  Anesthetic complications: no      Cardiovascular status: blood pressure returned to baseline and hemodynamically stable  Respiratory status: unassisted  Hydration status: euvolemic  Follow-up not needed.  Comments: Refer to nursing note for pain/milagros score upon discharge from recovery.          Vitals Value Taken Time   /65 08/23/23 1207   Temp  08/23/23 1208   Pulse 64 08/23/23 1207   Resp 12 08/23/23 1207   SpO2 91 % 08/23/23 1207   Vitals shown include unvalidated device data.      No case tracking events are documented in the log.      Pain/Milagros Score: Milagros Score: 8 (8/23/2023 12:03 PM)

## 2023-08-23 NOTE — TRANSFER OF CARE
Anesthesia Transfer of Care Note    Patient: Jason Chaudhary    Procedure(s) Performed: * No procedures listed *    Patient location: PACU    Anesthesia Type: general    Transport from OR: Transported from OR on room air with adequate spontaneous ventilation    Post pain: adequate analgesia    Post assessment: no apparent anesthetic complications and tolerated procedure well    Post vital signs: stable    Level of consciousness: alert and responds to stimulation    Nausea/Vomiting: no nausea/vomiting    Complications: none    Transfer of care protocol was followed      Last vitals:   Visit Vitals  /71   Pulse 68   Resp 16   SpO2 (!) 94%

## 2023-08-28 LAB — BSA FOR ECHO PROCEDURE: 2.86 M2

## 2023-08-29 ENCOUNTER — OFFICE VISIT (OUTPATIENT)
Dept: FAMILY MEDICINE | Facility: CLINIC | Age: 48
End: 2023-08-29
Payer: COMMERCIAL

## 2023-08-29 VITALS
HEIGHT: 78 IN | RESPIRATION RATE: 20 BRPM | WEIGHT: 315 LBS | TEMPERATURE: 98 F | BODY MASS INDEX: 36.45 KG/M2 | SYSTOLIC BLOOD PRESSURE: 137 MMHG | HEART RATE: 56 BPM | DIASTOLIC BLOOD PRESSURE: 81 MMHG | OXYGEN SATURATION: 95 %

## 2023-08-29 DIAGNOSIS — I48.91 A-FIB: ICD-10-CM

## 2023-08-29 DIAGNOSIS — I48.91 ATRIAL FIBRILLATION WITH RVR: Primary | Chronic | ICD-10-CM

## 2023-08-29 DIAGNOSIS — G47.33 OSA (OBSTRUCTIVE SLEEP APNEA): Chronic | ICD-10-CM

## 2023-08-29 DIAGNOSIS — I10 ESSENTIAL HYPERTENSION: Chronic | ICD-10-CM

## 2023-08-29 PROCEDURE — 1159F PR MEDICATION LIST DOCUMENTED IN MEDICAL RECORD: ICD-10-PCS | Mod: ,,, | Performed by: FAMILY MEDICINE

## 2023-08-29 PROCEDURE — 1159F MED LIST DOCD IN RCRD: CPT | Mod: ,,, | Performed by: FAMILY MEDICINE

## 2023-08-29 PROCEDURE — 1160F RVW MEDS BY RX/DR IN RCRD: CPT | Mod: ,,, | Performed by: FAMILY MEDICINE

## 2023-08-29 PROCEDURE — 99214 PR OFFICE/OUTPT VISIT, EST, LEVL IV, 30-39 MIN: ICD-10-PCS | Mod: ,,, | Performed by: FAMILY MEDICINE

## 2023-08-29 PROCEDURE — 3075F PR MOST RECENT SYSTOLIC BLOOD PRESS GE 130-139MM HG: ICD-10-PCS | Mod: ,,, | Performed by: FAMILY MEDICINE

## 2023-08-29 PROCEDURE — 3075F SYST BP GE 130 - 139MM HG: CPT | Mod: ,,, | Performed by: FAMILY MEDICINE

## 2023-08-29 PROCEDURE — 1111F DSCHRG MED/CURRENT MED MERGE: CPT | Mod: ,,, | Performed by: FAMILY MEDICINE

## 2023-08-29 PROCEDURE — 3079F PR MOST RECENT DIASTOLIC BLOOD PRESSURE 80-89 MM HG: ICD-10-PCS | Mod: ,,, | Performed by: FAMILY MEDICINE

## 2023-08-29 PROCEDURE — 3044F HG A1C LEVEL LT 7.0%: CPT | Mod: ,,, | Performed by: FAMILY MEDICINE

## 2023-08-29 PROCEDURE — 3044F PR MOST RECENT HEMOGLOBIN A1C LEVEL <7.0%: ICD-10-PCS | Mod: ,,, | Performed by: FAMILY MEDICINE

## 2023-08-29 PROCEDURE — 3008F PR BODY MASS INDEX (BMI) DOCUMENTED: ICD-10-PCS | Mod: ,,, | Performed by: FAMILY MEDICINE

## 2023-08-29 PROCEDURE — 1160F PR REVIEW ALL MEDS BY PRESCRIBER/CLIN PHARMACIST DOCUMENTED: ICD-10-PCS | Mod: ,,, | Performed by: FAMILY MEDICINE

## 2023-08-29 PROCEDURE — 1111F PR DISCHARGE MEDS RECONCILED W/ CURRENT OUTPATIENT MED LIST: ICD-10-PCS | Mod: ,,, | Performed by: FAMILY MEDICINE

## 2023-08-29 PROCEDURE — 99214 OFFICE O/P EST MOD 30 MIN: CPT | Mod: ,,, | Performed by: FAMILY MEDICINE

## 2023-08-29 PROCEDURE — 3079F DIAST BP 80-89 MM HG: CPT | Mod: ,,, | Performed by: FAMILY MEDICINE

## 2023-08-29 PROCEDURE — 3008F BODY MASS INDEX DOCD: CPT | Mod: ,,, | Performed by: FAMILY MEDICINE

## 2023-08-29 NOTE — PROGRESS NOTES
Spike Johnson MD    [address] [place] [address]     PATIENT NAME: Jason Chaudhary  : 1975  DATE: 23  MRN: [id number]      Billing Provider: Spike Johnson MD  Level of Service: NE OFFICE/OUTPT VISIT, EST, LEVL IV, 30-39 MIN  Patient PCP Information       Provider PCP Type    Spike Johnson MD General            Reason for Visit / Chief Complaint: Follow-up (Here today for hospital follow up. Admitted on 08/15/2023 to [facility] for [facility]-fib, followed by Dr. Stock. Reports shocking his heart to NSR. States he feels much better. Denies any flutter, no rapid heart rate or any chest pain. )       Update PCP  Update Chief Complaint         History of Present Illness / Problem Focused Workflow     Jason Chaudhary presents to the clinic with Follow-up (Here today for hospital follow up. Admitted on 08/15/2023 to [facility] for [facility]fib, followed by Dr. Stock. Reports shocking his heart to NSR. States he feels much better. Denies any flutter, no rapid heart rate or any chest pain. )     He was cardioverted back into normal sinus rhythm, has felt well since. Pulse is actually a little slow today in clinic. He is seeing Dr. Stock at [facility]        HPI    Review of Systems     Review of Systems   Constitutional:  Negative for activity change, appetite change, chills, fatigue and fever.   HENT:  Negative for nasal congestion, ear pain, hearing loss, postnasal drip and sore throat.    Respiratory:  Negative for cough, chest tightness, shortness of breath and wheezing.    Cardiovascular:  Negative for chest pain, palpitations, leg swelling and claudication.   Gastrointestinal:  Negative for abdominal pain, change in bowel habit, constipation, diarrhea, nausea, vomiting and change in bowel habit.   Genitourinary:  Negative for dysuria.   Musculoskeletal:  Negative for arthralgias, back pain, gait problem and myalgias.   Integumentary:   Negative for rash.   Neurological:  Negative for weakness and headaches.   Psychiatric/Behavioral:  Negative for suicidal ideas. The patient is not nervous/anxious.         Medical / Social / Family History     Past Medical History:   Diagnosis Date    Allergy     Hypertension     NEGRITA (obstructive sleep apnea)     Paroxysmal atrial fibrillation     Psoriasis        Past Surgical History:   Procedure Laterality Date    ECHOCARDIOGRAM,TRANSESOPHAGEAL N/A 8/16/2023    Procedure: Transesophageal echo (JUVENAL) intra-procedure log documentation;  Surgeon: Cal Stock MD;  Location: [facility] CATH LAB;  Service: Cardiology;  Laterality: N/A;    RECONSTRUCTION OF CHEST WALL Bilateral     TREATMENT OF CARDIAC ARRHYTHMIA N/A 8/16/2023    Procedure: Cardioversion or Defibrillation;  Surgeon: Cal Stock MD;  Location: [facility] CATH [facility];  Service: Cardiology;  Laterality: N/A;    WRIST SURGERY Left     cyst removal       Social History    reports that he has never smoked. He has never been exposed to tobacco smoke. He has never used smokeless tobacco. He reports that he does not drink alcohol and does not use drugs.    Family History  's family history includes Diabetes in his father; Heart failure in his father; Hypertension in his father and mother.    Medications and Allergies     Medications  Outpatient Medications Marked as Taking for the 8/29/23 encounter (Office Visit) with Spike Johnson MD   Medication Sig Dispense Refill    ELIQUIS 5 mg Tab Take 5 mg by mouth 2 (two) times daily.      fexofenadine (ALLEGRA) 180 MG tablet Take 180 mg by mouth once daily.      guselkumab (TREMFYA) 100 mg/mL AtIn 100mg SC x1 on wk 0, 4 then q8wks 2 each 2    metoprolol tartrate (LOPRESSOR) 50 MG tablet Take 1 tablet (50 mg total) by mouth 2 (two) times daily. 60 tablet 0    multivit-min-folic acid-vit K (MULTI FOR HIM, NO IRON,) 400-40 mcg Cap as directed Orally      naproxen sodium (ANAPROX) 220 MG tablet 1 tablet with food  or milk as needed Orally every 12 hrs         Allergies  Review of patient's allergies indicates:   Allergen Reactions    Pcn [penicillins]     Penicillin g potassium Rash and Nausea And Vomiting       Physical Examination     Vitals:    08/29/23 1253   BP: 137/81   Pulse: (!) 56   Resp: 20   Temp: 97.9 °F (36.6 °C)     Physical Exam  Vitals and nursing note reviewed.   Constitutional:       General: He is not in acute distress.     Appearance: Normal appearance. He is not ill-appearing.   Eyes:      Extraocular Movements: Extraocular movements intact.      Pupils: Pupils are equal, round, and reactive to light.   Cardiovascular:      Rate and Rhythm: Normal rate and regular rhythm.      Heart sounds: Normal heart sounds.   Pulmonary:      Effort: Pulmonary effort is normal.      Breath sounds: Normal breath sounds.   Abdominal:      General: Bowel sounds are normal.      Palpations: Abdomen is soft.   Musculoskeletal:         General: Normal range of motion.   Skin:     Findings: No rash.   Neurological:      General: No focal deficit present.      Mental Status: He is alert and oriented to person, place, and time. Mental status is at baseline.   Psychiatric:         Mood and Affect: Mood normal.         Behavior: Behavior normal.            Assessment and Plan (including Health Maintenance)      Problem List  Smart Sets  Document Outside HM   :    Plan:     No changes in treatment today. Continue to follow with Cardiology and report any concerns     Health Maintenance Due   Topic Date Due    TETANUS VACCINE  Never done    COVID-19 Vaccine (3 - Pfizer series) 06/03/2021       Problem List Items Addressed This Visit          Cardiac/Vascular    Essential hypertension (Chronic)    Overview      - Goal blood pressure for most patients is less than 130/85. Both numbers are important. If you have questions about your specific goal blood pressure, please ask at your clinic visits.   - Check blood pressure outside of  clinic, record the numbers, and bring the log to all your office visits.   - If you have any chest pain, SOB, or other concerning symptoms, report these immediately, or go to the nearest ER.    - Recommend cardiovascular exercise, at least 3 times per week, for at least 15 minutes.   - Eat a heart healthy diet.            Atrial fibrillation with RVR - Primary (Chronic)       Other    NEGRITA (obstructive sleep apnea) (Chronic)       Health Maintenance Topics with due status: Not Due       Topic Last Completion Date    Influenza Vaccine 11/01/2022    Lipid Panel 04/27/2023    Hemoglobin A1c (Diabetic Prevention Screening) 08/16/2023    Colorectal Cancer Screening 08/23/2023       Procedures     Future Appointments   Date Time Provider Department Center   9/8/2023  9:00 AM Cal Stock MD [facility]   10/27/2023  1:00 PM Spike Johnson MD [facility]   11/7/2023  1:00 PM Shelly Tracey FNP [facility]   2/27/2024 12:30 PM Spike Johnson MD [facility]   5/3/2024 12:45 PM Spike Johnson MD [facility]        Follow up in about 6 months (around 2/29/2024) for chronic health problems, hypertension.     Signature:  Spike Johnson MD  [address] [place] [address]  Phone #: [phone]  Fax #: [phone]    Date of encounter: 8/29/23    There are no Patient Instructions on file for this visit.

## 2023-09-07 NOTE — PROGRESS NOTES
PCP: Spike Johnson MD    Referring Provider:     Subjective:   Jason Chaudhary is a 47 y.o. male with hx of  recent diagnosis of atrial flutter status post JUVENAL cardioversion who presents for  inpatient follow-up visit.    Patient seen in consult on 08/16/2023 at [facility] where he presented for chest tightness, dyspnea and palpitations.  Symptoms originally started 1 week ago and he was seen initial but he was found to have rate controlled atrial flutter and started on Eliquis and cardiology referral made.  He then presented to the hospital with above complaints.  Found to have atrial flutter.  Underwent JUVENAL cardioversion and was successfully cardioverted to normal sinus rhythm.    9/8/23:   Presents for inpatient follow-up.  Complaints of shortness of breath with exertion and orthopnea.  Appears euvolemic on examination.    Occasional palpitations noted. NSR on EKG today.  Compliant with CPAP.    Fhx:  Mother ( hypertension), father (hypertension, diabetes, heart failure)  Shx:  never smoker    EKG  09/08/2023:  Normal sinus rhythm with first-degree AV block    ECHO No results found for this or any previous visit.     CATH: Results for orders placed during the hospital encounter of 08/15/23    JUVENAL    Left Ventricle: The left ventricle is normal in size. Normal wall thickness. Normal wall motion. There is normal systolic function with a visually estimated ejection fraction of 55 - 60%.    Left Atrium: The left atrial appendage appears normal. Appendage velocity is normal at greater than 40 cm/sec. There is no thrombus in the left atrial appendage.    Right Ventricle: Normal right ventricular cavity size.    Mitral Valve: There is no mass/vegetation present. There is mild regurgitation.     Lab Results   Component Value Date     08/17/2023    K 3.1 (L) 08/17/2023     08/17/2023    CO2 29 08/17/2023    BUN 13 08/17/2023    CREATININE 0.94 08/17/2023    CALCIUM 8.5 08/17/2023    ANIONGAP 10 08/17/2023     EGFRNONAA 100 04/26/2022       Lab Results   Component Value Date    CHOL 129 04/27/2023    CHOL 131 04/26/2022    CHOL 133 09/16/2021     Lab Results   Component Value Date    HDL 30 (L) 04/27/2023    HDL 30 (L) 04/26/2022    HDL 34 (L) 09/16/2021     Lab Results   Component Value Date    LDLCALC 80 04/27/2023    LDLCALC 77 04/26/2022    LDLCALC 77 09/16/2021     Lab Results   Component Value Date    TRIG 95 04/27/2023    TRIG 120 04/26/2022    TRIG 111 09/16/2021     Lab Results   Component Value Date    CHOLHDL 4.3 04/27/2023    CHOLHDL 4.4 04/26/2022    CHOLHDL 3.9 09/16/2021       Lab Results   Component Value Date    WBC 7.57 08/17/2023    HGB 13.3 (L) 08/17/2023    HCT 39.7 (L) 08/17/2023    MCV 89.4 08/17/2023     08/17/2023           Current Outpatient Medications:     fexofenadine (ALLEGRA) 180 MG tablet, Take 180 mg by mouth once daily., Disp: , Rfl:     guselkumab (TREMFYA) 100 mg/mL AtIn, 100mg SC x1 on wk 0, 4 then q8wks, Disp: 2 each, Rfl: 2    multivit-min-folic acid-vit K (MULTI FOR HIM, NO IRON,) 400-40 mcg Cap, as directed Orally, Disp: , Rfl:     naproxen sodium (ANAPROX) 220 MG tablet, 1 tablet with food or milk as needed Orally every 12 hrs, Disp: , Rfl:     apixaban (ELIQUIS) 5 mg Tab, Take 1 tablet (5 mg total) by mouth 2 (two) times daily., Disp: 90 tablet, Rfl: 3    furosemide (LASIX) 20 MG tablet, Take 1 tablet (20 mg total) by mouth once daily., Disp: 30 tablet, Rfl: 11    metoprolol succinate (TOPROL-XL) 50 MG 24 hr tablet, Take 1 tablet (50 mg total) by mouth once daily., Disp: 90 tablet, Rfl: 3    Review of Systems   Constitutional:  Negative for chills, diaphoresis, fever and malaise/fatigue.   Respiratory:  Negative for cough and shortness of breath.    Cardiovascular:  Positive for orthopnea. Negative for chest pain, palpitations, claudication, leg swelling and PND.   Gastrointestinal:  Negative for abdominal pain, heartburn, nausea and vomiting.   Neurological:  Negative for  "dizziness.       Objective:   BP (!) 140/90 (BP Location: Left arm, Patient Position: Sitting)   Pulse 64   Ht 6' 6" (1.981 m)   Wt (!) 147 kg (324 lb)   SpO2 97%   BMI 37.44 kg/m²     Physical Exam  Constitutional:       General: He is not in acute distress.     Appearance: Normal appearance.   Cardiovascular:      Rate and Rhythm: Normal rate and regular rhythm.      Pulses: Normal pulses.      Heart sounds: Normal heart sounds. No murmur heard.     No friction rub. No gallop.   Pulmonary:      Effort: Pulmonary effort is normal.      Breath sounds: Normal breath sounds. No wheezing or rales.   Musculoskeletal:      Right lower leg: No edema.      Left lower leg: No edema.   Skin:     General: Skin is warm and dry.   Neurological:      Mental Status: He is alert.           Assessment:     1. Atrial fibrillation with RVR  EKG 12-lead    EKG 12-lead      2. Typical atrial flutter  Basic Metabolic Panel            Plan:   No problem-specific Assessment & Plan notes found for this encounter.      Typical atrial flutter status post JUVENAL cardioversion  NSR on EKG today   Continue Eliquis 5 mg b.I.d.   Change metoprolol IR to metoprolol XL 50 mg daily   Start Lasix 20 mg daily, repeat BMP in 2 weeks  Referred to EP (Dr. Haq)  for atrial flutter ablation    Hypertension   Repeat blood pressure 136/90  Instructed to call office blood pressure remains  above goal at home  Maintain blood pressure log     Follow up in 3 months  "

## 2023-09-08 ENCOUNTER — OFFICE VISIT (OUTPATIENT)
Dept: CARDIOLOGY | Facility: CLINIC | Age: 48
End: 2023-09-08
Payer: COMMERCIAL

## 2023-09-08 VITALS
HEART RATE: 64 BPM | OXYGEN SATURATION: 97 % | WEIGHT: 315 LBS | BODY MASS INDEX: 36.45 KG/M2 | SYSTOLIC BLOOD PRESSURE: 140 MMHG | DIASTOLIC BLOOD PRESSURE: 90 MMHG | HEIGHT: 78 IN

## 2023-09-08 DIAGNOSIS — I48.91 ATRIAL FIBRILLATION WITH RVR: Primary | Chronic | ICD-10-CM

## 2023-09-08 DIAGNOSIS — I48.3 TYPICAL ATRIAL FLUTTER: ICD-10-CM

## 2023-09-08 PROCEDURE — 3044F HG A1C LEVEL LT 7.0%: CPT | Mod: ,,, | Performed by: STUDENT IN AN ORGANIZED HEALTH CARE EDUCATION/TRAINING PROGRAM

## 2023-09-08 PROCEDURE — 93010 EKG 12-LEAD: ICD-10-PCS | Mod: S$PBB,,, | Performed by: STUDENT IN AN ORGANIZED HEALTH CARE EDUCATION/TRAINING PROGRAM

## 2023-09-08 PROCEDURE — 93010 ELECTROCARDIOGRAM REPORT: CPT | Mod: S$PBB,,, | Performed by: STUDENT IN AN ORGANIZED HEALTH CARE EDUCATION/TRAINING PROGRAM

## 2023-09-08 PROCEDURE — 99214 OFFICE O/P EST MOD 30 MIN: CPT | Mod: PBBFAC | Performed by: STUDENT IN AN ORGANIZED HEALTH CARE EDUCATION/TRAINING PROGRAM

## 2023-09-08 PROCEDURE — 1159F PR MEDICATION LIST DOCUMENTED IN MEDICAL RECORD: ICD-10-PCS | Mod: ,,, | Performed by: STUDENT IN AN ORGANIZED HEALTH CARE EDUCATION/TRAINING PROGRAM

## 2023-09-08 PROCEDURE — 99214 PR OFFICE/OUTPT VISIT, EST, LEVL IV, 30-39 MIN: ICD-10-PCS | Mod: S$PBB,,, | Performed by: STUDENT IN AN ORGANIZED HEALTH CARE EDUCATION/TRAINING PROGRAM

## 2023-09-08 PROCEDURE — 1111F DSCHRG MED/CURRENT MED MERGE: CPT | Mod: ,,, | Performed by: STUDENT IN AN ORGANIZED HEALTH CARE EDUCATION/TRAINING PROGRAM

## 2023-09-08 PROCEDURE — 99214 OFFICE O/P EST MOD 30 MIN: CPT | Mod: S$PBB,,, | Performed by: STUDENT IN AN ORGANIZED HEALTH CARE EDUCATION/TRAINING PROGRAM

## 2023-09-08 PROCEDURE — 1160F RVW MEDS BY RX/DR IN RCRD: CPT | Mod: ,,, | Performed by: STUDENT IN AN ORGANIZED HEALTH CARE EDUCATION/TRAINING PROGRAM

## 2023-09-08 PROCEDURE — 3080F DIAST BP >= 90 MM HG: CPT | Mod: ,,, | Performed by: STUDENT IN AN ORGANIZED HEALTH CARE EDUCATION/TRAINING PROGRAM

## 2023-09-08 PROCEDURE — 93005 ELECTROCARDIOGRAM TRACING: CPT | Mod: PBBFAC | Performed by: STUDENT IN AN ORGANIZED HEALTH CARE EDUCATION/TRAINING PROGRAM

## 2023-09-08 PROCEDURE — 1111F PR DISCHARGE MEDS RECONCILED W/ CURRENT OUTPATIENT MED LIST: ICD-10-PCS | Mod: ,,, | Performed by: STUDENT IN AN ORGANIZED HEALTH CARE EDUCATION/TRAINING PROGRAM

## 2023-09-08 PROCEDURE — 1160F PR REVIEW ALL MEDS BY PRESCRIBER/CLIN PHARMACIST DOCUMENTED: ICD-10-PCS | Mod: ,,, | Performed by: STUDENT IN AN ORGANIZED HEALTH CARE EDUCATION/TRAINING PROGRAM

## 2023-09-08 PROCEDURE — 1159F MED LIST DOCD IN RCRD: CPT | Mod: ,,, | Performed by: STUDENT IN AN ORGANIZED HEALTH CARE EDUCATION/TRAINING PROGRAM

## 2023-09-08 PROCEDURE — 3077F SYST BP >= 140 MM HG: CPT | Mod: ,,, | Performed by: STUDENT IN AN ORGANIZED HEALTH CARE EDUCATION/TRAINING PROGRAM

## 2023-09-08 PROCEDURE — 3044F PR MOST RECENT HEMOGLOBIN A1C LEVEL <7.0%: ICD-10-PCS | Mod: ,,, | Performed by: STUDENT IN AN ORGANIZED HEALTH CARE EDUCATION/TRAINING PROGRAM

## 2023-09-08 PROCEDURE — 3008F PR BODY MASS INDEX (BMI) DOCUMENTED: ICD-10-PCS | Mod: ,,, | Performed by: STUDENT IN AN ORGANIZED HEALTH CARE EDUCATION/TRAINING PROGRAM

## 2023-09-08 PROCEDURE — 3077F PR MOST RECENT SYSTOLIC BLOOD PRESSURE >= 140 MM HG: ICD-10-PCS | Mod: ,,, | Performed by: STUDENT IN AN ORGANIZED HEALTH CARE EDUCATION/TRAINING PROGRAM

## 2023-09-08 PROCEDURE — 3080F PR MOST RECENT DIASTOLIC BLOOD PRESSURE >= 90 MM HG: ICD-10-PCS | Mod: ,,, | Performed by: STUDENT IN AN ORGANIZED HEALTH CARE EDUCATION/TRAINING PROGRAM

## 2023-09-08 PROCEDURE — 3008F BODY MASS INDEX DOCD: CPT | Mod: ,,, | Performed by: STUDENT IN AN ORGANIZED HEALTH CARE EDUCATION/TRAINING PROGRAM

## 2023-09-08 RX ORDER — METOPROLOL SUCCINATE 50 MG/1
50 TABLET, EXTENDED RELEASE ORAL DAILY
Qty: 90 TABLET | Refills: 3 | Status: SHIPPED | OUTPATIENT
Start: 2023-09-08 | End: 2024-09-07

## 2023-09-08 RX ORDER — FUROSEMIDE 20 MG/1
20 TABLET ORAL DAILY
Qty: 30 TABLET | Refills: 11 | Status: SHIPPED | OUTPATIENT
Start: 2023-09-08 | End: 2024-09-07

## 2023-09-08 NOTE — PATIENT INSTRUCTIONS
Start lasix 20 mg daily   Stop lopressor   Start toprol 50 mg daily   Labs in 2 weeks   Referral to EP will be made  Follow-up in 3 months

## 2023-10-02 ENCOUNTER — PATIENT OUTREACH (OUTPATIENT)
Dept: ADMINISTRATIVE | Facility: HOSPITAL | Age: 48
End: 2023-10-02

## 2023-10-02 NOTE — PROGRESS NOTES
...Population Health Review...  [employer] Provider Activity Report  0 CM[facility] completed during CMH benefit period  .CMH scheduled for 10/27/2023

## 2023-10-12 ENCOUNTER — OFFICE VISIT (OUTPATIENT)
Dept: CARDIOLOGY | Facility: CLINIC | Age: 48
End: 2023-10-12
Payer: COMMERCIAL

## 2023-10-12 VITALS
HEIGHT: 78 IN | OXYGEN SATURATION: 95 % | HEART RATE: 71 BPM | BODY MASS INDEX: 36.45 KG/M2 | SYSTOLIC BLOOD PRESSURE: 130 MMHG | WEIGHT: 315 LBS | DIASTOLIC BLOOD PRESSURE: 70 MMHG

## 2023-10-12 DIAGNOSIS — I48.3 TYPICAL ATRIAL FLUTTER: Primary | ICD-10-CM

## 2023-10-12 PROCEDURE — 3075F PR MOST RECENT SYSTOLIC BLOOD PRESS GE 130-139MM HG: ICD-10-PCS | Mod: ,,, | Performed by: STUDENT IN AN ORGANIZED HEALTH CARE EDUCATION/TRAINING PROGRAM

## 2023-10-12 PROCEDURE — 3044F PR MOST RECENT HEMOGLOBIN A1C LEVEL <7.0%: ICD-10-PCS | Mod: ,,, | Performed by: STUDENT IN AN ORGANIZED HEALTH CARE EDUCATION/TRAINING PROGRAM

## 2023-10-12 PROCEDURE — 99214 OFFICE O/P EST MOD 30 MIN: CPT | Mod: PBBFAC | Performed by: STUDENT IN AN ORGANIZED HEALTH CARE EDUCATION/TRAINING PROGRAM

## 2023-10-12 PROCEDURE — 3078F DIAST BP <80 MM HG: CPT | Mod: ,,, | Performed by: STUDENT IN AN ORGANIZED HEALTH CARE EDUCATION/TRAINING PROGRAM

## 2023-10-12 PROCEDURE — 1159F MED LIST DOCD IN RCRD: CPT | Mod: ,,, | Performed by: STUDENT IN AN ORGANIZED HEALTH CARE EDUCATION/TRAINING PROGRAM

## 2023-10-12 PROCEDURE — 1159F PR MEDICATION LIST DOCUMENTED IN MEDICAL RECORD: ICD-10-PCS | Mod: ,,, | Performed by: STUDENT IN AN ORGANIZED HEALTH CARE EDUCATION/TRAINING PROGRAM

## 2023-10-12 PROCEDURE — 3008F PR BODY MASS INDEX (BMI) DOCUMENTED: ICD-10-PCS | Mod: ,,, | Performed by: STUDENT IN AN ORGANIZED HEALTH CARE EDUCATION/TRAINING PROGRAM

## 2023-10-12 PROCEDURE — 99214 OFFICE O/P EST MOD 30 MIN: CPT | Mod: S$PBB,,, | Performed by: STUDENT IN AN ORGANIZED HEALTH CARE EDUCATION/TRAINING PROGRAM

## 2023-10-12 PROCEDURE — 99214 PR OFFICE/OUTPT VISIT, EST, LEVL IV, 30-39 MIN: ICD-10-PCS | Mod: S$PBB,,, | Performed by: STUDENT IN AN ORGANIZED HEALTH CARE EDUCATION/TRAINING PROGRAM

## 2023-10-12 PROCEDURE — 3008F BODY MASS INDEX DOCD: CPT | Mod: ,,, | Performed by: STUDENT IN AN ORGANIZED HEALTH CARE EDUCATION/TRAINING PROGRAM

## 2023-10-12 PROCEDURE — 3078F PR MOST RECENT DIASTOLIC BLOOD PRESSURE < 80 MM HG: ICD-10-PCS | Mod: ,,, | Performed by: STUDENT IN AN ORGANIZED HEALTH CARE EDUCATION/TRAINING PROGRAM

## 2023-10-12 PROCEDURE — 3044F HG A1C LEVEL LT 7.0%: CPT | Mod: ,,, | Performed by: STUDENT IN AN ORGANIZED HEALTH CARE EDUCATION/TRAINING PROGRAM

## 2023-10-12 PROCEDURE — 3075F SYST BP GE 130 - 139MM HG: CPT | Mod: ,,, | Performed by: STUDENT IN AN ORGANIZED HEALTH CARE EDUCATION/TRAINING PROGRAM

## 2023-10-12 NOTE — PROGRESS NOTES
PCP: Spike Johnson MD    Referring Provider:     Subjective:   Jason Chaudhary is a 47 y.o. male with hx of atrial flutter status post JUVENAL cardioversion who presents for a follow-up visit.    Patient seen in consult on 08/16/2023 at [facility] where he presented for chest tightness, dyspnea and palpitations.  Symptoms originally started 1 week ago and he was seen initial but he was found to have rate controlled atrial flutter and started on Eliquis and cardiology referral made.  He then presented to the hospital with above complaints.  Found to have atrial flutter.  Underwent JUVENAL cardioversion and was successfully cardioverted to normal sinus rhythm.    9/8/23:   Presents for inpatient follow-up.  Complaints of shortness of breath with exertion and orthopnea.  Appears euvolemic on examination.    Occasional palpitations noted. NSR on EKG today.  Compliant with CPAP.    10/15/23: Presents for follow-up Doing well.  Denies any chest pain/tightness, dyspnea, lightheadedness or syncope. Has rare palpitations. EP appt coming up.       Fhx:  Mother ( hypertension), father (hypertension, diabetes, heart failure)  Shx:  never smoker    EKG  09/08/2023:  Normal sinus rhythm with first-degree AV block    ECHO No results found for this or any previous visit.     CATH: Results for orders placed during the hospital encounter of 08/15/23    JUVENAL    Left Ventricle: The left ventricle is normal in size. Normal wall thickness. Normal wall motion. There is normal systolic function with a visually estimated ejection fraction of 55 - 60%.    Left Atrium: The left atrial appendage appears normal. Appendage velocity is normal at greater than 40 cm/sec. There is no thrombus in the left atrial appendage.    Right Ventricle: Normal right ventricular cavity size.    Mitral Valve: There is no mass/vegetation present. There is mild regurgitation.     Lab Results   Component Value Date     10/12/2023    K 3.4 (L) 10/12/2023      10/12/2023    CO2 32 10/12/2023    BUN 10 10/12/2023    CREATININE 0.95 10/12/2023    CALCIUM 9.3 10/12/2023    ANIONGAP 6 (L) 10/12/2023    EGFRNONAA 100 04/26/2022       Lab Results   Component Value Date    CHOL 129 04/27/2023    CHOL 131 04/26/2022    CHOL 133 09/16/2021     Lab Results   Component Value Date    HDL 30 (L) 04/27/2023    HDL 30 (L) 04/26/2022    HDL 34 (L) 09/16/2021     Lab Results   Component Value Date    LDLCALC 80 04/27/2023    LDLCALC 77 04/26/2022    LDLCALC 77 09/16/2021     Lab Results   Component Value Date    TRIG 95 04/27/2023    TRIG 120 04/26/2022    TRIG 111 09/16/2021     Lab Results   Component Value Date    CHOLHDL 4.3 04/27/2023    CHOLHDL 4.4 04/26/2022    CHOLHDL 3.9 09/16/2021       Lab Results   Component Value Date    WBC 7.57 08/17/2023    HGB 13.3 (L) 08/17/2023    HCT 39.7 (L) 08/17/2023    MCV 89.4 08/17/2023     08/17/2023           Current Outpatient Medications:     apixaban (ELIQUIS) 5 mg Tab, Take 1 tablet (5 mg total) by mouth 2 (two) times daily., Disp: 90 tablet, Rfl: 3    fexofenadine (ALLEGRA) 180 MG tablet, Take 180 mg by mouth once daily., Disp: , Rfl:     furosemide (LASIX) 20 MG tablet, Take 1 tablet (20 mg total) by mouth once daily., Disp: 30 tablet, Rfl: 11    guselkumab (TREMFYA) 100 mg/mL AtIn, 100mg SC x1 on wk 0, 4 then q8wks, Disp: 2 each, Rfl: 2    metoprolol succinate (TOPROL-XL) 50 MG 24 hr tablet, Take 1 tablet (50 mg total) by mouth once daily., Disp: 90 tablet, Rfl: 3    multivit-min-folic acid-vit K (MULTI FOR HIM, NO IRON,) 400-40 mcg Cap, as directed Orally, Disp: , Rfl:     naproxen sodium (ANAPROX) 220 MG tablet, 1 tablet with food or milk as needed Orally every 12 hrs, Disp: , Rfl:     Review of Systems   Constitutional:  Negative for chills, diaphoresis, fever and malaise/fatigue.   Respiratory:  Negative for cough and shortness of breath.    Cardiovascular:  Positive for orthopnea. Negative for chest pain, palpitations,  "claudication, leg swelling and PND.   Gastrointestinal:  Negative for abdominal pain, heartburn, nausea and vomiting.   Neurological:  Negative for dizziness.       Objective:   /70 (BP Location: Left arm, Patient Position: Sitting)   Pulse 71   Ht 6' 6" (1.981 m)   Wt (!) 151.3 kg (333 lb 8 oz)   SpO2 95%   BMI 38.54 kg/m²     Physical Exam  Constitutional:       General: He is not in acute distress.     Appearance: Normal appearance.   Cardiovascular:      Rate and Rhythm: Normal rate and regular rhythm.      Pulses: Normal pulses.      Heart sounds: Normal heart sounds. No murmur heard.     No friction rub. No gallop.   Pulmonary:      Effort: Pulmonary effort is normal.      Breath sounds: Normal breath sounds. No wheezing or rales.   Musculoskeletal:      Right lower leg: No edema.      Left lower leg: No edema.   Skin:     General: Skin is warm and dry.   Neurological:      Mental Status: He is alert.           Assessment:     1. Typical atrial flutter  Basic Metabolic Panel              Plan:   No problem-specific Assessment & Plan notes found for this encounter.      Typical atrial flutter status post JUVENAL cardioversion  NSR on EKG today   Continue Eliquis 5 mg b.I.d.   Continue metoprolol XL 50 mg daily   Continue Lasix 20 mg daily  Repeat BMP today  Referred to EP (Dr. Haq)  for atrial flutter ablation. Appt coming up     Hypertension   Controlled on current regimen    Follow up in 3 months    "

## 2023-11-07 ENCOUNTER — OFFICE VISIT (OUTPATIENT)
Dept: RHEUMATOLOGY | Facility: CLINIC | Age: 48
End: 2023-11-07
Payer: COMMERCIAL

## 2023-11-07 VITALS
HEIGHT: 78 IN | SYSTOLIC BLOOD PRESSURE: 160 MMHG | BODY MASS INDEX: 36.45 KG/M2 | WEIGHT: 315 LBS | RESPIRATION RATE: 16 BRPM | DIASTOLIC BLOOD PRESSURE: 82 MMHG | HEART RATE: 89 BPM | OXYGEN SATURATION: 96 %

## 2023-11-07 DIAGNOSIS — L40.9 PSORIASIS: ICD-10-CM

## 2023-11-07 DIAGNOSIS — Z79.899 HIGH RISK MEDICATION USE: ICD-10-CM

## 2023-11-07 DIAGNOSIS — L40.53 PSORIATIC SPONDYLITIS: Primary | ICD-10-CM

## 2023-11-07 PROBLEM — D84.821 IMMUNODEFICIENCY DUE TO LONG TERM DRUG THERAPY: Status: ACTIVE | Noted: 2023-11-07

## 2023-11-07 PROCEDURE — 3008F BODY MASS INDEX DOCD: CPT | Mod: ,,, | Performed by: NURSE PRACTITIONER

## 2023-11-07 PROCEDURE — 99215 OFFICE O/P EST HI 40 MIN: CPT | Mod: PBBFAC | Performed by: NURSE PRACTITIONER

## 2023-11-07 PROCEDURE — 1159F MED LIST DOCD IN RCRD: CPT | Mod: ,,, | Performed by: NURSE PRACTITIONER

## 2023-11-07 PROCEDURE — 3079F DIAST BP 80-89 MM HG: CPT | Mod: ,,, | Performed by: NURSE PRACTITIONER

## 2023-11-07 PROCEDURE — 99214 PR OFFICE/OUTPT VISIT, EST, LEVL IV, 30-39 MIN: ICD-10-PCS | Mod: S$PBB,,, | Performed by: NURSE PRACTITIONER

## 2023-11-07 PROCEDURE — 3077F PR MOST RECENT SYSTOLIC BLOOD PRESSURE >= 140 MM HG: ICD-10-PCS | Mod: ,,, | Performed by: NURSE PRACTITIONER

## 2023-11-07 PROCEDURE — 3079F PR MOST RECENT DIASTOLIC BLOOD PRESSURE 80-89 MM HG: ICD-10-PCS | Mod: ,,, | Performed by: NURSE PRACTITIONER

## 2023-11-07 PROCEDURE — 3044F PR MOST RECENT HEMOGLOBIN A1C LEVEL <7.0%: ICD-10-PCS | Mod: ,,, | Performed by: NURSE PRACTITIONER

## 2023-11-07 PROCEDURE — 99214 OFFICE O/P EST MOD 30 MIN: CPT | Mod: S$PBB,,, | Performed by: NURSE PRACTITIONER

## 2023-11-07 PROCEDURE — 3077F SYST BP >= 140 MM HG: CPT | Mod: ,,, | Performed by: NURSE PRACTITIONER

## 2023-11-07 PROCEDURE — 1159F PR MEDICATION LIST DOCUMENTED IN MEDICAL RECORD: ICD-10-PCS | Mod: ,,, | Performed by: NURSE PRACTITIONER

## 2023-11-07 PROCEDURE — 3044F HG A1C LEVEL LT 7.0%: CPT | Mod: ,,, | Performed by: NURSE PRACTITIONER

## 2023-11-07 PROCEDURE — 3008F PR BODY MASS INDEX (BMI) DOCUMENTED: ICD-10-PCS | Mod: ,,, | Performed by: NURSE PRACTITIONER

## 2023-11-07 RX ORDER — POTASSIUM CHLORIDE 1500 MG/1
20 TABLET, EXTENDED RELEASE ORAL 2 TIMES DAILY
COMMUNITY
Start: 2023-10-23 | End: 2024-02-27

## 2023-11-07 NOTE — PROGRESS NOTES
"    RHEUMATOLOGY OUTPATIENT CLINIC NOTE    Subjective:       Patient ID: Jason Chaudhary is a 47 y.o. male.    Chief Complaint: Follow-up (4mo follow up on Tremfya. States that the rash is now spreading on his L leg. It is worse than before)       History of Present Illness: 48 y/o male presents to the clinic today to follow up on monitoring labs while taking Tremfya. No labs drawn prior to visit. States that overall patient cannot tell any difference with joint pain or discomfort with Tremfya. Endorses right shoulder pain and right knee pain. States that Mobic was helping with joint pain but stopped Mobic after A-Flutter episode with successful cardioversion to NSR and being started on Eliquis 5 mg BID. States that he has a cardiac ablation procedure scheduled for 1/2/2023 with Dr. Alvarado in [place], [place]. See picture below for psoriasis area to left calf. States that this area started at golf ball size in 2001 and has doubled in size every year and endorses itching and redness. Denies any exacerbating factors. Previously tried and failed Humira but states that he never had joint pain/stiffness relief or skin relief from psoriasis. Denies any other places where he has psoriasis. Previously saw Dr. Ramirez with Dermatology in 2021 for other problem unrelated to psoriasis.    Past Medical History:   Diagnosis Date    Allergy     Hypertension     NEGRITA (obstructive sleep apnea)     Paroxysmal atrial fibrillation     Psoriasis      Social History     Tobacco Use    Smoking status: Never     Passive exposure: Never    Smokeless tobacco: Never   Substance Use Topics    Alcohol use: Never     Review of patient's allergies indicates:   Allergen Reactions    Pcn [penicillins]     Penicillin g potassium Rash and Nausea And Vomiting       Objective:   BP (!) 160/82 (BP Location: Left arm, Patient Position: Sitting, BP Method: Large (Manual))   Pulse 89   Resp 16   Ht 6' 6" (1.981 m)   Wt (!) 152 kg (335 lb)   SpO2 " 96%   BMI 38.71 kg/m²     Immunization History   Administered Date(s) Administered    COVID-19, MRNA, LN-S, PF (Pfizer) (Purple Cap) 03/18/2021, 04/08/2021    Influenza - Quadrivalent - PF *Preferred* (6 months and older) 11/24/2021, 11/01/2022     Current Outpatient Medications   Medication Instructions    apixaban (ELIQUIS) 5 mg, Oral, 2 times daily    fexofenadine (ALLEGRA) 180 mg, Oral, Daily    furosemide (LASIX) 20 mg, Oral, Daily    guselkumab (TREMFYA) 100 mg/mL AtIn 100mg SC x1 on wk 0, 4 then q8wks    metoprolol succinate (TOPROL-XL) 50 mg, Oral, Daily    multivit-min-folic acid-vit K (MULTI FOR HIM, NO IRON,) 400-40 mcg Cap as directed Orally    naproxen sodium (ANAPROX) 220 MG tablet 1 tablet with food or milk as needed Orally every 12 hrs    potassium chloride (K-TAB) 20 mEq 20 mEq, Oral, 2 times daily, for three days      BIOLOGIC LABS  Lab Results   Component Value Date    HEPCAB Non-Reactive 08/25/2022      RHEUM LABS  Lab Results   Component Value Date    CRP 0.56 08/25/2022     PREVENTATIVE MEDICINE  Cancer screenings/skin cancer screen: Colonoscopy 08/23/2023  Contraception: n/a    Answers submitted by the patient for this visit:  Rheumatology Questionnaire (Submitted on 11/4/2023)  mouth sores: No  trouble swallowing: No  unexpected weight change: No  genital sore: No    Assessment:     Review of Systems   Constitutional:  Negative for fever.   Eyes:  Negative for redness.   Respiratory:  Negative for shortness of breath.    Cardiovascular:  Negative for chest pain.   Gastrointestinal:  Negative for constipation and diarrhea.   Musculoskeletal:  Positive for joint pain. Negative for falls.   Skin:  Negative for rash.   Neurological:  Positive for headaches.   Endo/Heme/Allergies:  Does not bruise/bleed easily.        Physical Exam   Constitutional: No distress.   Cardiovascular: Normal rate.   Pulmonary/Chest: Effort normal and breath sounds normal. He has no wheezes.   Abdominal: Soft.    Musculoskeletal:         General: Normal range of motion.      Cervical back: No rigidity.   Neurological: He is alert.   Skin: Rash noted.   Psoriasis to left calf area (see picture)   Psychiatric: His behavior is normal. Mood normal.   Vitals reviewed.       1. Psoriatic spondylitis    2. High risk medication use    3. Psoriasis        I have reviewed the following:     Details / Date    [x]   Labs BMP     []   Micro     []   Pathology     []   Imaging     []   Cardiology Procedures     [x]   Other Chart Review        States that psoriasis to left calf area initially started as small golf ball size that doubles in size every year and is now to this point shown above in the picture. States that so far Humira nor Tremfya has helped it.     Plan:     Dermatology referral  CBC and AST/ALT today for routine monitoring while taking Tremfya  Continue Tremfya 100 mg SC every 8 weeks as prescribed  Follow up with Dr. Isabelle aDy virtually prior to cardiac ablation.      35 minutes of total time spent on the encounter, which includes face to face time and non-face to face time preparing to see the patient (eg, review of tests), Obtaining and/or reviewing separately obtained history, Documenting clinical information in the electronic or other health record, Independently interpreting results (not separately reported) and communicating results to the patient/family/caregiver, or Care coordination (not separately reported).            Shelly Tracey, YOLA  [facility]  [id number] [facility] [place] [address]  [phone]

## 2023-11-09 ENCOUNTER — OFFICE VISIT (OUTPATIENT)
Dept: FAMILY MEDICINE | Facility: CLINIC | Age: 48
End: 2023-11-09
Payer: COMMERCIAL

## 2023-11-09 VITALS
BODY MASS INDEX: 36.45 KG/M2 | DIASTOLIC BLOOD PRESSURE: 83 MMHG | WEIGHT: 315 LBS | OXYGEN SATURATION: 96 % | HEIGHT: 78 IN | TEMPERATURE: 98 F | SYSTOLIC BLOOD PRESSURE: 139 MMHG | HEART RATE: 63 BPM | RESPIRATION RATE: 20 BRPM

## 2023-11-09 DIAGNOSIS — I10 ESSENTIAL HYPERTENSION: Primary | Chronic | ICD-10-CM

## 2023-11-09 DIAGNOSIS — M25.50 MULTIPLE JOINT PAIN: Chronic | ICD-10-CM

## 2023-11-09 DIAGNOSIS — L40.53 PSORIATIC SPONDYLITIS: Chronic | ICD-10-CM

## 2023-11-09 DIAGNOSIS — G47.33 OSA (OBSTRUCTIVE SLEEP APNEA): Chronic | ICD-10-CM

## 2023-11-09 DIAGNOSIS — I48.91 ATRIAL FIBRILLATION WITH RVR: Chronic | ICD-10-CM

## 2023-11-09 PROBLEM — M15.0 PRIMARY OSTEOARTHRITIS INVOLVING MULTIPLE JOINTS: Chronic | Status: ACTIVE | Noted: 2021-09-16

## 2023-11-09 LAB
CREAT UR-MCNC: 48 MG/DL (ref 39–259)
MICROALBUMIN UR-MCNC: <0.5 MG/DL (ref 0–2.8)
MICROALBUMIN/CREAT RATIO PNL UR: <10.4 MG/G (ref 0–30)

## 2023-11-09 PROCEDURE — 82043 MICROALBUMIN / CREATININE RATIO URINE: ICD-10-PCS | Mod: ,,, | Performed by: CLINICAL MEDICAL LABORATORY

## 2023-11-09 PROCEDURE — 3079F DIAST BP 80-89 MM HG: CPT | Mod: ,,, | Performed by: FAMILY MEDICINE

## 2023-11-09 PROCEDURE — 3008F PR BODY MASS INDEX (BMI) DOCUMENTED: ICD-10-PCS | Mod: ,,, | Performed by: FAMILY MEDICINE

## 2023-11-09 PROCEDURE — 1160F PR REVIEW ALL MEDS BY PRESCRIBER/CLIN PHARMACIST DOCUMENTED: ICD-10-PCS | Mod: ,,, | Performed by: FAMILY MEDICINE

## 2023-11-09 PROCEDURE — 3044F HG A1C LEVEL LT 7.0%: CPT | Mod: ,,, | Performed by: FAMILY MEDICINE

## 2023-11-09 PROCEDURE — 99214 PR OFFICE/OUTPT VISIT, EST, LEVL IV, 30-39 MIN: ICD-10-PCS | Mod: ,,, | Performed by: FAMILY MEDICINE

## 2023-11-09 PROCEDURE — 3075F PR MOST RECENT SYSTOLIC BLOOD PRESS GE 130-139MM HG: ICD-10-PCS | Mod: ,,, | Performed by: FAMILY MEDICINE

## 2023-11-09 PROCEDURE — 3079F PR MOST RECENT DIASTOLIC BLOOD PRESSURE 80-89 MM HG: ICD-10-PCS | Mod: ,,, | Performed by: FAMILY MEDICINE

## 2023-11-09 PROCEDURE — 99214 OFFICE O/P EST MOD 30 MIN: CPT | Mod: ,,, | Performed by: FAMILY MEDICINE

## 2023-11-09 PROCEDURE — 3044F PR MOST RECENT HEMOGLOBIN A1C LEVEL <7.0%: ICD-10-PCS | Mod: ,,, | Performed by: FAMILY MEDICINE

## 2023-11-09 PROCEDURE — 82570 MICROALBUMIN / CREATININE RATIO URINE: ICD-10-PCS | Mod: ,,, | Performed by: CLINICAL MEDICAL LABORATORY

## 2023-11-09 PROCEDURE — 82570 ASSAY OF URINE CREATININE: CPT | Mod: ,,, | Performed by: CLINICAL MEDICAL LABORATORY

## 2023-11-09 PROCEDURE — 82043 UR ALBUMIN QUANTITATIVE: CPT | Mod: ,,, | Performed by: CLINICAL MEDICAL LABORATORY

## 2023-11-09 PROCEDURE — 1159F PR MEDICATION LIST DOCUMENTED IN MEDICAL RECORD: ICD-10-PCS | Mod: ,,, | Performed by: FAMILY MEDICINE

## 2023-11-09 PROCEDURE — 1160F RVW MEDS BY RX/DR IN RCRD: CPT | Mod: ,,, | Performed by: FAMILY MEDICINE

## 2023-11-09 PROCEDURE — 3075F SYST BP GE 130 - 139MM HG: CPT | Mod: ,,, | Performed by: FAMILY MEDICINE

## 2023-11-09 PROCEDURE — 3008F BODY MASS INDEX DOCD: CPT | Mod: ,,, | Performed by: FAMILY MEDICINE

## 2023-11-09 PROCEDURE — 1159F MED LIST DOCD IN RCRD: CPT | Mod: ,,, | Performed by: FAMILY MEDICINE

## 2023-11-09 NOTE — PROGRESS NOTES
Subjective     Patient ID: Jason Chaudhary is a 47 y.o. male.    Chief Complaint: Color Me Healthy (Here today for color me healthy appointment. ) and Health Maintenance (Patient would like to get flu vaccine today. )    HPI  Review of Systems   Constitutional:  Negative for activity change, appetite change, chills, fatigue and fever.   HENT:  Negative for nasal congestion, ear pain, hearing loss, postnasal drip and sore throat.    Respiratory:  Negative for cough, chest tightness, shortness of breath and wheezing.    Cardiovascular:  Negative for chest pain, palpitations, leg swelling and claudication.   Gastrointestinal:  Negative for abdominal pain, change in bowel habit, constipation, diarrhea, nausea and vomiting.   Genitourinary:  Negative for dysuria.   Musculoskeletal:  Negative for arthralgias, back pain, gait problem and myalgias.   Integumentary:  Negative for rash.   Neurological:  Negative for weakness and headaches.   Psychiatric/Behavioral:  Negative for suicidal ideas. The patient is not nervous/anxious.        Tobacco Use: Low Risk  (11/9/2023)    Patient History     Smoking Tobacco Use: Never     Smokeless Tobacco Use: Never     Passive Exposure: Never     Review of patient's allergies indicates:   Allergen Reactions    Pcn [penicillins]     Penicillin g potassium Rash and Nausea And Vomiting     Current Outpatient Medications   Medication Instructions    apixaban (ELIQUIS) 5 mg, Oral, 2 times daily    fexofenadine (ALLEGRA) 180 mg, Oral, Daily    furosemide (LASIX) 20 mg, Oral, Daily    guselkumab (TREMFYA) 100 mg/mL AtIn 100mg SC x1 on wk 0, 4 then q8wks    metoprolol succinate (TOPROL-XL) 50 mg, Oral, Daily    multivit-min-folic acid-vit K (MULTI FOR HIM, NO IRON,) 400-40 mcg Cap as directed Orally    naproxen sodium (ANAPROX) 220 MG tablet 1 tablet with food or milk as needed Orally every 12 hrs    potassium chloride (K-TAB) 20 mEq 20 mEq, Oral, 2 times daily, for three days  "    There are no discontinued medications.    Past Medical History:   Diagnosis Date    Allergy     Hypertension     NEGRITA (obstructive sleep apnea)     Paroxysmal atrial fibrillation     Psoriasis      Health Maintenance Topics with due status: Not Due       Topic Last Completion Date    Lipid Panel 04/27/2023    Hemoglobin A1c (Diabetic Prevention Screening) 08/16/2023    Colorectal Cancer Screening 08/23/2023     Immunization History   Administered Date(s) Administered    COVID-19, MRNA, LN-S, PF (Pfizer) (Purple Cap) 03/18/2021, 04/08/2021    Influenza - Quadrivalent - PF *Preferred* (6 months and older) 11/24/2021, 11/01/2022       Objective     Body mass index is 38.94 kg/m².  Wt Readings from Last 3 Encounters:   11/09/23 (!) 152.9 kg (337 lb)   11/07/23 (!) 152 kg (335 lb)   10/12/23 (!) 151.3 kg (333 lb 8 oz)     Ht Readings from Last 3 Encounters:   11/09/23 6' 6" (1.981 m)   11/07/23 6' 6" (1.981 m)   10/12/23 6' 6" (1.981 m)     BP Readings from Last 3 Encounters:   11/09/23 139/83   11/07/23 (!) 160/82   10/12/23 130/70     Temp Readings from Last 3 Encounters:   11/09/23 98 °F (36.7 °C) (Oral)   08/29/23 97.9 °F (36.6 °C) (Oral)   08/17/23 98.6 °F (37 °C)     Pulse Readings from Last 3 Encounters:   11/09/23 63   11/07/23 89   10/12/23 71     Resp Readings from Last 3 Encounters:   11/09/23 20   11/07/23 16   08/29/23 20     PF Readings from Last 3 Encounters:   No data found for PF       Physical Exam  Vitals and nursing note reviewed.   Constitutional:       General: He is not in acute distress.     Appearance: Normal appearance. He is not ill-appearing.   Eyes:      Extraocular Movements: Extraocular movements intact.      Pupils: Pupils are equal, round, and reactive to light.   Cardiovascular:      Rate and Rhythm: Normal rate and regular rhythm.      Heart sounds: Normal heart sounds.   Pulmonary:      Effort: Pulmonary effort is normal.      Breath sounds: Normal breath sounds.   Abdominal:      " General: Bowel sounds are normal.      Palpations: Abdomen is soft.   Musculoskeletal:         General: Normal range of motion.   Skin:     Findings: No rash.   Neurological:      General: No focal deficit present.      Mental Status: He is alert and oriented to person, place, and time. Mental status is at baseline.   Psychiatric:         Mood and Affect: Mood normal.         Behavior: Behavior normal.         Assessment and Plan     Problem List Items Addressed This Visit          Cardiac/Vascular    Essential hypertension - Primary (Chronic)    Relevant Orders    Microalbumin/Creatinine Ratio, Urine    Atrial fibrillation with RVR (Chronic)       Orthopedic    Multiple joint pain (Chronic)    Psoriatic spondylitis (Chronic)       Other    NEGRITA (obstructive sleep apnea) (Chronic)       Plan:       I  reviewed the medications, allergies, and problem list.     Goal Actions:    What type of visit is the patient here for today?: Color Me Healthy  Which Color Me Healthy program is the patient enrolling in?: Blood Pressure (Hypertension)  Is this a Wellness Follow Up?: Yes  What is your overall wellness goal? (select at least one): Lifestyle modifications, Lose weight, Understand disease process, Improve overall health  Choose 3: Biometric, Nutrition  Biometric Actions: Attend regularly scheduled office visits  Nurtrition Actions: Avoid adding table salt, Decrease intake of fast food, Avoid carbonated beverages, Read nutrition labels, Recommend weight loss, Eat a well-balanced diet

## 2023-11-10 ENCOUNTER — PATIENT OUTREACH (OUTPATIENT)
Dept: ADMINISTRATIVE | Facility: HOSPITAL | Age: 48
End: 2023-11-10

## 2023-11-10 NOTE — PROGRESS NOTES
Population Health Chart Review & Patient Outreach Details      Further Action Needed If Patient Returns Outreach:            Updates Requested / Reviewed:     []  Care Everywhere    []     []  External Sources ([facility], [facility], DIS, etc.)    [] [facility]   [] [facility]   [] Other:    []  Care Team Updated   []  Removed  or Duplicate Orders   []  Immunization Reconciliation Completed / Queried    [] [place]   [] [place]   [] [place]   [] [place]      Health Maintenance Topics Addressed and Outreach Outcomes / Actions Taken:             Breast Cancer Screening []  Mammogram Order Placed    []  Mammogram Screening Scheduled    []  External Records Requested & Care Team Updated if Applicable    []  External Records Uploaded & Care Team Updated if Applicable    []  Pt Declined Scheduling Mammogram    []  Pt Will Schedule with External Provider / Order Routed & Care Team Updated if Applicable              Cervical Cancer Screening []  Pap Smear Scheduled in Primary Care or OBGYN    []  External Records Requested & Care Team Updated if Applicable       []  External Records Uploaded, Care Team Updated, & History Updated if Applicable    []  Patient Declined Scheduling Pap Smear    []  Patient Will Schedule with External Provider & Care Team Updated if Applicable                  Colorectal Cancer Screening []  Colonoscopy Case Request / Referral / Home Test Order Placed    []  External Records Requested & Care Team Updated if Applicable    []  External Records Uploaded, Care Team Updated, & History Updated if Applicable    []  Patient Declined Completing Colon Cancer Screening    []  Patient Will Schedule with External Provider & Care Team Updated if Applicable    []  Fit Kit Mailed (add the SmartPhrase under additional notes)    []  Reminded Patient to Complete Home Test                Diabetic Eye Exam []  Eye Exam Screening Order Placed    []  Eye Camera Scheduled or Optometry/Ophthalmology Referral  Placed    []  External Records Requested & Care Team Updated if Applicable    []  External Records Uploaded, Care Team Updated, & History Updated if Applicable    []  Patient Declined Scheduling Eye Exam    []  Patient Will Schedule with External Provider & Care Team Updated if Applicable             Blood Pressure Control []  Primary Care Follow Up Visit Scheduled     []  Remote Blood Pressure Reading Captured    []  Patient Declined Remote Reading or Scheduling Appt - Escalated to PCP    []  Patient Will Call Back or Send Portal Message with Reading                 HbA1c & Other Labs []  Overdue Lab(s) Ordered    []  Overdue Lab(s) Scheduled    []  External Records Uploaded & Care Team Updated if Applicable    []  Primary Care Follow Up Visit Scheduled     []  Reminded Patient to Complete A1c Home Test    []  Patient Declined Scheduling Labs or Will Call Back to Schedule    []  Patient Will Schedule with External Provider / Order Routed, & Care Team Updated if Applicable           Primary Care Appointment []  Primary Care Appt Scheduled    []  Patient Declined Scheduling or Will Call Back to Schedule    []  Pt Established with External Provider, Updated Care Team, & Informed Pt to Notify Payor if Applicable           Medication Adherence /    Statin Use []  Primary Care Appointment Scheduled    []  Patient Reminded to  Prescription    []  Patient Declined, Provider Notified if Needed    []  Sent Provider Message to Review to Evaluate Pt for Statin, Add Exclusion Dx Codes, Document   Exclusion in Problem List, Change Statin Intensity Level to Moderate or High Intensity if Applicable                Osteoporosis Screening []  Dexa Order Placed    []  Dexa Appointment Scheduled    []  External Records Requested & Care Team Updated    []  External Records Uploaded, Care Team Updated, & History Updated if Applicable    []  Patient Declined Scheduling Dexa or Will Call Back to Schedule    []  Patient Will Schedule  with External Provider / Order Routed & Care Team Updated if Applicable       Additional Notes:.  Post visit Population Health review of encounter with date of service  11/9/23 with Getachew. Not  All required CMH components in encounter.  Needs wellness plan completed message sent to provider's staff via staff message. Yue  Followup appt for: 5/3/24 HY

## 2023-11-28 ENCOUNTER — OFFICE VISIT (OUTPATIENT)
Dept: FAMILY MEDICINE | Facility: CLINIC | Age: 48
End: 2023-11-28
Payer: COMMERCIAL

## 2023-11-28 VITALS
RESPIRATION RATE: 16 BRPM | DIASTOLIC BLOOD PRESSURE: 88 MMHG | HEART RATE: 105 BPM | WEIGHT: 315 LBS | SYSTOLIC BLOOD PRESSURE: 138 MMHG | BODY MASS INDEX: 36.45 KG/M2 | TEMPERATURE: 99 F | OXYGEN SATURATION: 96 % | HEIGHT: 78 IN

## 2023-11-28 DIAGNOSIS — J06.9 UPPER RESPIRATORY TRACT INFECTION, UNSPECIFIED TYPE: Primary | ICD-10-CM

## 2023-11-28 DIAGNOSIS — Z20.822 ENCOUNTER FOR LABORATORY TESTING FOR COVID-19 VIRUS: ICD-10-CM

## 2023-11-28 DIAGNOSIS — R05.9 COUGH, UNSPECIFIED TYPE: ICD-10-CM

## 2023-11-28 DIAGNOSIS — I10 ESSENTIAL HYPERTENSION: Chronic | ICD-10-CM

## 2023-11-28 DIAGNOSIS — G47.33 OSA (OBSTRUCTIVE SLEEP APNEA): Chronic | ICD-10-CM

## 2023-11-28 LAB
CTP QC/QA: YES
CTP QC/QA: YES
FLUAV AG NPH QL: NEGATIVE
FLUBV AG NPH QL: NEGATIVE
SARS-COV-2 RDRP RESP QL NAA+PROBE: NEGATIVE

## 2023-11-28 PROCEDURE — 3075F PR MOST RECENT SYSTOLIC BLOOD PRESS GE 130-139MM HG: ICD-10-PCS | Mod: ,,, | Performed by: FAMILY MEDICINE

## 2023-11-28 PROCEDURE — 3061F NEG MICROALBUMINURIA REV: CPT | Mod: ,,, | Performed by: FAMILY MEDICINE

## 2023-11-28 PROCEDURE — 99213 OFFICE O/P EST LOW 20 MIN: CPT | Mod: 25,,, | Performed by: FAMILY MEDICINE

## 2023-11-28 PROCEDURE — 3079F DIAST BP 80-89 MM HG: CPT | Mod: ,,, | Performed by: FAMILY MEDICINE

## 2023-11-28 PROCEDURE — 3008F BODY MASS INDEX DOCD: CPT | Mod: ,,, | Performed by: FAMILY MEDICINE

## 2023-11-28 PROCEDURE — 96372 PR INJECTION,THERAP/PROPH/DIAG2ST, IM OR SUBCUT: ICD-10-PCS | Mod: ,,, | Performed by: FAMILY MEDICINE

## 2023-11-28 PROCEDURE — 3044F PR MOST RECENT HEMOGLOBIN A1C LEVEL <7.0%: ICD-10-PCS | Mod: ,,, | Performed by: FAMILY MEDICINE

## 2023-11-28 PROCEDURE — 3066F PR DOCUMENTATION OF TREATMENT FOR NEPHROPATHY: ICD-10-PCS | Mod: ,,, | Performed by: FAMILY MEDICINE

## 2023-11-28 PROCEDURE — 3066F NEPHROPATHY DOC TX: CPT | Mod: ,,, | Performed by: FAMILY MEDICINE

## 2023-11-28 PROCEDURE — 1160F PR REVIEW ALL MEDS BY PRESCRIBER/CLIN PHARMACIST DOCUMENTED: ICD-10-PCS | Mod: ,,, | Performed by: FAMILY MEDICINE

## 2023-11-28 PROCEDURE — 1159F PR MEDICATION LIST DOCUMENTED IN MEDICAL RECORD: ICD-10-PCS | Mod: ,,, | Performed by: FAMILY MEDICINE

## 2023-11-28 PROCEDURE — 3079F PR MOST RECENT DIASTOLIC BLOOD PRESSURE 80-89 MM HG: ICD-10-PCS | Mod: ,,, | Performed by: FAMILY MEDICINE

## 2023-11-28 PROCEDURE — 3075F SYST BP GE 130 - 139MM HG: CPT | Mod: ,,, | Performed by: FAMILY MEDICINE

## 2023-11-28 PROCEDURE — 96372 THER/PROPH/DIAG INJ SC/IM: CPT | Mod: ,,, | Performed by: FAMILY MEDICINE

## 2023-11-28 PROCEDURE — 87635: ICD-10-PCS | Mod: QW,,, | Performed by: FAMILY MEDICINE

## 2023-11-28 PROCEDURE — 1159F MED LIST DOCD IN RCRD: CPT | Mod: ,,, | Performed by: FAMILY MEDICINE

## 2023-11-28 PROCEDURE — 99213 PR OFFICE/OUTPT VISIT, EST, LEVL III, 20-29 MIN: ICD-10-PCS | Mod: 25,,, | Performed by: FAMILY MEDICINE

## 2023-11-28 PROCEDURE — 3061F PR NEG MICROALBUMINURIA RESULT DOCUMENTED/REVIEW: ICD-10-PCS | Mod: ,,, | Performed by: FAMILY MEDICINE

## 2023-11-28 PROCEDURE — 87804 POCT INFLUENZA A/B: ICD-10-PCS | Mod: 59,QW,, | Performed by: FAMILY MEDICINE

## 2023-11-28 PROCEDURE — 1160F RVW MEDS BY RX/DR IN RCRD: CPT | Mod: ,,, | Performed by: FAMILY MEDICINE

## 2023-11-28 PROCEDURE — 3008F PR BODY MASS INDEX (BMI) DOCUMENTED: ICD-10-PCS | Mod: ,,, | Performed by: FAMILY MEDICINE

## 2023-11-28 PROCEDURE — 87804 INFLUENZA ASSAY W/OPTIC: CPT | Mod: 59,QW,, | Performed by: FAMILY MEDICINE

## 2023-11-28 PROCEDURE — 87635 SARS-COV-2 COVID-19 AMP PRB: CPT | Mod: QW,,, | Performed by: FAMILY MEDICINE

## 2023-11-28 PROCEDURE — 3044F HG A1C LEVEL LT 7.0%: CPT | Mod: ,,, | Performed by: FAMILY MEDICINE

## 2023-11-28 RX ORDER — CEFTRIAXONE 1 G/1
1 INJECTION, POWDER, FOR SOLUTION INTRAMUSCULAR; INTRAVENOUS
Status: COMPLETED | OUTPATIENT
Start: 2023-11-28 | End: 2023-11-28

## 2023-11-28 RX ORDER — AZITHROMYCIN 250 MG/1
TABLET, FILM COATED ORAL
Qty: 6 TABLET | Refills: 0 | Status: SHIPPED | OUTPATIENT
Start: 2023-11-28 | End: 2023-12-03

## 2023-11-28 RX ORDER — DEXAMETHASONE SODIUM PHOSPHATE 4 MG/ML
4 INJECTION, SOLUTION INTRA-ARTICULAR; INTRALESIONAL; INTRAMUSCULAR; INTRAVENOUS; SOFT TISSUE
Status: COMPLETED | OUTPATIENT
Start: 2023-11-28 | End: 2023-11-28

## 2023-11-28 RX ADMIN — DEXAMETHASONE SODIUM PHOSPHATE 4 MG: 4 INJECTION, SOLUTION INTRA-ARTICULAR; INTRALESIONAL; INTRAMUSCULAR; INTRAVENOUS; SOFT TISSUE at 01:11

## 2023-11-28 RX ADMIN — CEFTRIAXONE 1 G: 1 INJECTION, POWDER, FOR SOLUTION INTRAMUSCULAR; INTRAVENOUS at 01:11

## 2023-11-28 NOTE — PROGRESS NOTES
Spike Johnson MD    [address] [place] [address]     PATIENT NAME: Jason Chaudhary  : 1975  DATE: 23  MRN: [id number]      Billing Provider: Spike Johnson MD  Level of Service: [place] OFFICE/OUTPT VISIT, EST, LEVL III, 20-29 MIN  Patient PCP Information       Provider PCP Type    Spike Johnson MD General            Reason for Visit / Chief Complaint: Cough, Nasal Congestion, Sinus Problem, and Chest Congestion (Started yesterday morning)       Update PCP  Update Chief Complaint         History of Present Illness / Problem Focused Workflow     Jason Chaudhary presents to the clinic with Cough, Nasal Congestion, Sinus Problem, and Chest Congestion (Started yesterday morning)     HPI    Review of Systems     Review of Systems   Constitutional:  Positive for fatigue. Negative for activity change, appetite change, chills and fever.   HENT:  Positive for nasal congestion, sinus pressure/congestion and sore throat. Negative for ear pain, hearing loss and postnasal drip.    Respiratory:  Positive for cough. Negative for chest tightness, shortness of breath and wheezing.    Cardiovascular:  Negative for chest pain, palpitations, leg swelling and claudication.   Gastrointestinal:  Negative for abdominal pain, change in bowel habit, constipation, diarrhea, nausea and vomiting.   Genitourinary:  Negative for dysuria.   Musculoskeletal:  Negative for arthralgias, back pain, gait problem and myalgias.   Integumentary:  Negative for rash.   Neurological:  Negative for weakness and headaches.   Psychiatric/Behavioral:  Negative for suicidal ideas. The patient is not nervous/anxious.         Medical / Social / Family History     Past Medical History:   Diagnosis Date    Allergy     Hypertension     NEGRITA (obstructive sleep apnea)     Paroxysmal atrial fibrillation     Psoriasis        Past Surgical History:   Procedure Laterality Date     ECHOCARDIOGRAM,TRANSESOPHAGEAL N/A 8/16/2023    Procedure: Transesophageal echo (JUVENAL) intra-procedure log documentation;  Surgeon: Cal Stock MD;  Location: [facility] CATH LAB;  Service: Cardiology;  Laterality: N/A;    RECONSTRUCTION OF CHEST WALL Bilateral     TREATMENT OF CARDIAC ARRHYTHMIA N/A 8/16/2023    Procedure: Cardioversion or Defibrillation;  Surgeon: Cal Stock MD;  Location: [facility] CATH LAB;  Service: Cardiology;  Laterality: N/A;    WRIST SURGERY Left     cyst removal       Social History    reports that he has never smoked. He has never been exposed to tobacco smoke. He has never used smokeless tobacco. He reports that he does not drink alcohol and does not use drugs.    Family History  's family history includes Diabetes in his father; Heart failure in his father; Hypertension in his father and mother.    Medications and Allergies     Medications  Outpatient Medications Marked as Taking for the 11/28/23 encounter (Office Visit) with Spike Johnson MD   Medication Sig Dispense Refill    apixaban (ELIQUIS) 5 mg Tab Take 1 tablet (5 mg total) by mouth 2 (two) times daily. 90 tablet 3    fexofenadine (ALLEGRA) 180 MG tablet Take 180 mg by mouth once daily.      furosemide (LASIX) 20 MG tablet Take 1 tablet (20 mg total) by mouth once daily. 30 tablet 11    guselkumab (TREMFYA) 100 mg/mL AtIn 100mg SC x1 on wk 0, 4 then q8wks 2 each 2    metoprolol succinate (TOPROL-XL) 50 MG 24 hr tablet Take 1 tablet (50 mg total) by mouth once daily. 90 tablet 3    multivit-min-folic acid-vit K (MULTI FOR HIM, NO IRON,) 400-40 mcg Cap as directed Orally      naproxen sodium (ANAPROX) 220 MG tablet 1 tablet with food or milk as needed Orally every 12 hrs       Current Facility-Administered Medications for the 11/28/23 encounter (Office Visit) with Spike Johnson MD   Medication Dose Route Frequency Provider Last Rate Last Admin    cefTRIAXone injection 1 g  1 g Intramuscular 1 time in  Clinic/HOD Spike Johnson MD        dexAMETHasone injection 4 mg  4 mg Intramuscular 1 time in Clinic/HOD Spike Johnson MD           Allergies  Review of patient's allergies indicates:   Allergen Reactions    Pcn [penicillins]     Penicillin g potassium Rash and Nausea And Vomiting       Physical Examination     Vitals:    11/28/23 1225   BP: 138/88   Pulse: 105   Resp: 16   Temp: 99.4 °F (37.4 °C)     Physical Exam  Vitals and nursing note reviewed.   Constitutional:       General: He is not in acute distress.     Appearance: Normal appearance. He is not ill-appearing.   HENT:      Right Ear: Tympanic membrane, ear canal and external ear normal.      Left Ear: Tympanic membrane, ear canal and external ear normal.      Nose: Congestion and rhinorrhea present.      Mouth/Throat:      Mouth: Mucous membranes are moist.      Pharynx: Posterior oropharyngeal erythema present. No oropharyngeal exudate.   Eyes:      Extraocular Movements: Extraocular movements intact.      Pupils: Pupils are equal, round, and reactive to light.   Cardiovascular:      Rate and Rhythm: Normal rate and regular rhythm.      Heart sounds: Normal heart sounds.   Pulmonary:      Effort: Pulmonary effort is normal.      Breath sounds: Normal breath sounds.   Abdominal:      General: Bowel sounds are normal.      Palpations: Abdomen is soft.   Musculoskeletal:         General: Normal range of motion.   Lymphadenopathy:      Cervical: No cervical adenopathy.   Skin:     Findings: No rash.   Neurological:      General: No focal deficit present.      Mental Status: He is alert and oriented to person, place, and time. Mental status is at baseline.   Psychiatric:         Mood and Affect: Mood normal.         Behavior: Behavior normal.            Assessment and Plan (including Health Maintenance)      Problem List  Smart Sets  Document Outside HM   :    Plan:     Negative for Flu and COVID, possible for a false negative    Symptoms are more  suggestive of bacterial sinus infection     Health Maintenance Due   Topic Date Due    Pneumococcal Vaccines (Age 0-64) (1 - PCV) Never done    TETANUS VACCINE  Never done    Influenza Vaccine (1) 09/01/2023    COVID-19 Vaccine (3 - 2023-24 season) 09/01/2023       Problem List Items Addressed This Visit          Cardiac/Vascular    Essential hypertension (Chronic)    Overview      - Goal blood pressure for most patients is less than 130/85. Both numbers are important. If you have questions about your specific goal blood pressure, please ask at your clinic visits.   - Check blood pressure outside of clinic, record the numbers, and bring the log to all your office visits.   - If you have any chest pain, SOB, or other concerning symptoms, report these immediately, or go to the nearest ER.    - Recommend cardiovascular exercise, at least 3 times per week, for at least 15 minutes.   - Eat a heart healthy diet.               Other    NEGRITA (obstructive sleep apnea) (Chronic)     Other Visit Diagnoses       Upper respiratory tract infection, unspecified type    -  Primary    Relevant Medications    cefTRIAXone injection 1 g (Start on 11/28/2023  1:30 PM)    dexAMETHasone injection 4 mg (Start on 11/28/2023  1:30 PM)    azithromycin (Z-UBALDO) 250 MG tablet    chlorpheniramine-phenylephrine 4-10 mg per tablet    Encounter for laboratory testing for COVID-19 virus        Relevant Orders    POCT COVID-19 Rapid Screening (Completed)    Cough, unspecified type        Relevant Orders    POCT Influenza A/B Rapid Antigen (Completed)            Health Maintenance Topics with due status: Not Due       Topic Last Completion Date    Lipid Panel 04/27/2023    Hemoglobin A1c (Diabetic Prevention Screening) 08/16/2023    Colorectal Cancer Screening 08/23/2023       Procedures     Future Appointments   Date Time Provider Department Center   12/6/2023  1:30 PM Isabelle Day MD [facility]   2[facility]27[facility]2024 12:30 [facility]   4/17/2024 10:15 AM Cal Stock MD [facility]   5/3/2024 12:45 PM Spike Johnson MD [facility]        Follow up if symptoms worsen or fail to improve.     Signature:  Spike Johnson MD  [address] [place] [address]  Phone #: [phone]  Fax #: [phone]    Date of encounter: 11/28/23    There are no Patient Instructions on file for this visit.

## 2023-11-28 NOTE — PROGRESS NOTES
Rocephin 1 gram IM/Dexamethasone 4mg IM to left dorsogluteal. Tolerated well. Instructed to wait 15 mintues to monitor for any adverse reaction. Verbal understanding given. No s/s of adverse reaction noted.

## 2023-11-29 ENCOUNTER — PATIENT OUTREACH (OUTPATIENT)
Dept: ADMINISTRATIVE | Facility: HOSPITAL | Age: 48
End: 2023-11-29

## 2023-11-29 NOTE — PROGRESS NOTES
Population Health Chart Review & Patient Outreach Details    Per [facility] website, insurance is active and pt is listed on the attributed list needing a healthy you performed in   HY scheduled for 5/3/2024    Updates Requested / Reviewed:     []  Care Everywhere    []     []  External Sources ([facility], [facility], DIS, etc.)    [] [facility]   [] [facility]   [] Other:    []  Care Team Updated   []  Removed  or Duplicate Orders   []  Immunization Reconciliation Completed / Queried    [] [place]   [] [place]   [] [place]   [] [place]      Health Maintenance Topics Addressed and Outreach Outcomes / Actions Taken:             Breast Cancer Screening []  Mammogram Order Placed    []  Mammogram Screening Scheduled    []  External Records Requested & Care Team Updated if Applicable    []  External Records Uploaded & Care Team Updated if Applicable    []  Pt Declined Scheduling Mammogram    []  Pt Will Schedule with External Provider / Order Routed & Care Team Updated if Applicable              Cervical Cancer Screening []  Pap Smear Scheduled in Primary Care or OBGYN    []  External Records Requested & Care Team Updated if Applicable       []  External Records Uploaded, Care Team Updated, & History Updated if Applicable    []  Patient Declined Scheduling Pap Smear    []  Patient Will Schedule with External Provider & Care Team Updated if Applicable                  Colorectal Cancer Screening []  Colonoscopy Case Request / Referral / Home Test Order Placed    []  External Records Requested & Care Team Updated if Applicable    []  External Records Uploaded, Care Team Updated, & History Updated if Applicable    []  Patient Declined Completing Colon Cancer Screening    []  Patient Will Schedule with External Provider & Care Team Updated if Applicable    []  Fit Kit Mailed (add the SmartPhrase under additional notes)    []  Reminded Patient to Complete Home Test                Diabetic Eye Exam []  Eye Exam  Screening Order Placed    []  Eye Camera Scheduled or Optometry/Ophthalmology Referral Placed    []  External Records Requested & Care Team Updated if Applicable    []  External Records Uploaded, Care Team Updated, & History Updated if Applicable    []  Patient Declined Scheduling Eye Exam    []  Patient Will Schedule with External Provider & Care Team Updated if Applicable             Blood Pressure Control []  Primary Care Follow Up Visit Scheduled     []  Remote Blood Pressure Reading Captured    []  Patient Declined Remote Reading or Scheduling Appt - Escalated to PCP    []  Patient Will Call Back or Send Portal Message with Reading                 HbA1c & Other Labs []  Overdue Lab(s) Ordered    []  Overdue Lab(s) Scheduled    []  External Records Uploaded & Care Team Updated if Applicable    []  Primary Care Follow Up Visit Scheduled     []  Reminded Patient to Complete A1c Home Test    []  Patient Declined Scheduling Labs or Will Call Back to Schedule    []  Patient Will Schedule with External Provider / Order Routed, & Care Team Updated if Applicable           Primary Care Appointment []  Primary Care Appt Scheduled    []  Patient Declined Scheduling or Will Call Back to Schedule    []  Pt Established with External Provider, Updated Care Team, & Informed Pt to Notify Payor if Applicable           Medication Adherence /    Statin Use []  Primary Care Appointment Scheduled    []  Patient Reminded to  Prescription    []  Patient Declined, Provider Notified if Needed    []  Sent Provider Message to Review to Evaluate Pt for Statin, Add Exclusion Dx Codes, Document   Exclusion in Problem List, Change Statin Intensity Level to Moderate or High Intensity if Applicable                Osteoporosis Screening []  Dexa Order Placed    []  Dexa Appointment Scheduled    []  External Records Requested & Care Team Updated    []  External Records Uploaded, Care Team Updated, & History Updated if Applicable    []   Patient Declined Scheduling Dexa or Will Call Back to Schedule    []  Patient Will Schedule with External Provider / Order Routed & Care Team Updated if Applicable       Additional Notes:

## 2023-12-06 ENCOUNTER — PATIENT MESSAGE (OUTPATIENT)
Dept: RHEUMATOLOGY | Facility: CLINIC | Age: 48
End: 2023-12-06
Payer: COMMERCIAL

## 2023-12-06 ENCOUNTER — OFFICE VISIT (OUTPATIENT)
Dept: RHEUMATOLOGY | Facility: CLINIC | Age: 48
End: 2023-12-06
Payer: COMMERCIAL

## 2023-12-06 DIAGNOSIS — Z79.899 HIGH RISK MEDICATION USE: ICD-10-CM

## 2023-12-06 DIAGNOSIS — L40.53 PSORIATIC SPONDYLITIS: Primary | ICD-10-CM

## 2023-12-06 PROCEDURE — 3044F HG A1C LEVEL LT 7.0%: CPT | Mod: 95,,, | Performed by: INTERNAL MEDICINE

## 2023-12-06 PROCEDURE — 99215 PR OFFICE/OUTPT VISIT, EST, LEVL V, 40-54 MIN: ICD-10-PCS | Mod: 95,,, | Performed by: INTERNAL MEDICINE

## 2023-12-06 PROCEDURE — 3044F PR MOST RECENT HEMOGLOBIN A1C LEVEL <7.0%: ICD-10-PCS | Mod: 95,,, | Performed by: INTERNAL MEDICINE

## 2023-12-06 PROCEDURE — 3066F PR DOCUMENTATION OF TREATMENT FOR NEPHROPATHY: ICD-10-PCS | Mod: 95,,, | Performed by: INTERNAL MEDICINE

## 2023-12-06 PROCEDURE — 3066F NEPHROPATHY DOC TX: CPT | Mod: 95,,, | Performed by: INTERNAL MEDICINE

## 2023-12-06 PROCEDURE — 3061F NEG MICROALBUMINURIA REV: CPT | Mod: 95,,, | Performed by: INTERNAL MEDICINE

## 2023-12-06 PROCEDURE — 99215 OFFICE O/P EST HI 40 MIN: CPT | Mod: 95,,, | Performed by: INTERNAL MEDICINE

## 2023-12-06 PROCEDURE — 3061F PR NEG MICROALBUMINURIA RESULT DOCUMENTED/REVIEW: ICD-10-PCS | Mod: 95,,, | Performed by: INTERNAL MEDICINE

## 2023-12-06 NOTE — PROGRESS NOTES
Isabelle Day MD   [facility]  [address] [facility] [place] [address]  [phone]      PATIENT NAME: Jason Chaudhary  : 1975  DATE: 23  MRN: [id number]      Billing Provider: Isabelle Day MD  Level of Service:   Patient PCP Information       Provider PCP Type    Spike Johnson MD General            Reason for Visit / Chief Complaint: No chief complaint on file.     The patient location is: home  The chief complaint leading to consultation is:    Visit type: audio-visual    Face to Face time with patient:    30  minutes of total time spent on the encounter, which includes face to face time and non-face to face time preparing to see the patient (eg, review of tests), Obtaining and/or reviewing separately obtained history, Documenting clinical information in the electronic or other health record, Independently interpreting results (not separately reported) and communicating results to the patient/family/caregiver, or Care coordination (not separately reported).         Each patient to whom he or she provides medical services by telemedicine is:  (1) informed of the relationship between the physician and patient and the respective role of any other health care provider with respect to management of the patient; and (2) notified that he or she may decline to receive medical services by telemedicine and may withdraw from such care at any time.    Notes:         Assessment and Plan (including Health Maintenance)      Problem List  Smart Sets  Document Outside HM   :    Plan:     Chronic psoriasis but newly dx psoriatic spondylitis.   Started bDMARD today.     2/15/2023;   Had a slight response to Humira but skin patch over left lower extremity is still pruritic and not healed. Did not notice a significant response to joint pain with Humira.   Willing to switch biologic routes. Can try Tremfya. Had stopped Humira last month already.         07/05/2023:  Started Tremfya. S/p 3 shots [ 2 loading and one maintenance]   The psoriasis on the left lower extremity is improving. Not as itchy anymore.   Would recommend starting PT for low back discomfort. Denies paresthesia or lower extremity sharp shooting pains.   Plan is to continue with Tremfya.   FUP with edmundo mayorga in 4 months . Labs prior to her appt.     Dec 6 2023;   Skin has cleared up a little bit but not much. States joints are ok  Non smoker. 'May need to switch Rinvoq.   FUP with NP in January for Rx on Rinvoq  Will need to get labs every 3 months.   I will defer rinvoq order to NP since its end of 2023 and we will likely need to start a new drug PA early next year. Patient agrees to continue Tremfya.   Since last visit has been dx with A-flutter and is on Eliquis.                      History of Present Illness / Problem Focused Workflow     Jason Chaudhary presents to the clinic with No chief complaint on file.     Has had psoriasis for atleast a decade. Currently has a patch on the left side of the leg.   Has used primarily topical agents.   Affirms global stiffness affecting neck , shoulder and lower back. Takes Aleve once a week. States he has to stretch his ankles every morning before weight bearing.       Review of Systems     Answers for HPI/ROS submitted by the patient on 8/23/2022  fever: No  eye redness: No  mouth sores: No  headaches: No  shortness of breath: No  chest pain: No  trouble swallowing: No  diarrhea: No  constipation: No  unexpected weight change: No  genital sore: No  During the last 3 days, have you had a skin rash?: No  Bruises or bleeds easily: No  cough: No        Medical / Social / Family History     Past Medical History:   Diagnosis Date    Allergy     Hypertension     NEGRITA (obstructive sleep apnea)     Paroxysmal atrial fibrillation     Psoriasis        Past Surgical History:   Procedure Laterality Date    ECHOCARDIOGRAM,TRANSESOPHAGEAL N/A 8/16/2023     Procedure: Transesophageal echo (JUVENAL) intra-procedure log documentation;  Surgeon: Cal Stock MD;  Location: [facility] CATH LAB;  Service: Cardiology;  Laterality: N/A;    RECONSTRUCTION OF CHEST WALL Bilateral     TREATMENT OF CARDIAC ARRHYTHMIA N/A 8/16/2023    Procedure: Cardioversion or Defibrillation;  Surgeon: Cal Stock MD;  Location: [facility] CATH LAB;  Service: Cardiology;  Laterality: N/A;    WRIST SURGERY Left     cyst removal       Social History  Mr. Jason Chaudhary  reports that he has never smoked. He has never been exposed to tobacco smoke. He has never used smokeless tobacco. He reports that he does not drink alcohol and does not use drugs.    Family History  'david Chaudhary family history includes Diabetes in his father; Heart failure in his father; Hypertension in his father and mother.    Medications and Allergies     Medications  No outpatient medications have been marked as taking for the 12/6/23 encounter (Appointment) with Isabelle Day MD.       Allergies  Review of patient's allergies indicates:   Allergen Reactions    Pcn [penicillins]     Penicillin g potassium Rash and Nausea And Vomiting       Physical Examination     There were no vitals filed for this visit.    Physical Exam  Constitutional:       Appearance: He is obese.   Musculoskeletal:         General: Tenderness present.      Comments: Scar on left wrist from prior ganglion cyst removal.                 Signature:  Isabelle Day MD  [facility]  [address] [place] [id number]  [phone]    Date of encounter: 12/6/23        Answers submitted by the patient for this visit:  Rheumatology Questionnaire (Submitted on 12/4/2023)  fever: No  eye redness: No  mouth sores: Yes  headaches: No  shortness of breath: No  chest pain: No  trouble swallowing: No  diarrhea: No  constipation: No  unexpected weight change: No  genital sore: No  During  the last 3 days, have you had a skin rash?: No  Bruises or bleeds easily: No  cough: Yes

## 2024-01-30 ENCOUNTER — OFFICE VISIT (OUTPATIENT)
Dept: RHEUMATOLOGY | Facility: CLINIC | Age: 49
End: 2024-01-30
Payer: COMMERCIAL

## 2024-01-30 VITALS
HEART RATE: 77 BPM | WEIGHT: 315 LBS | SYSTOLIC BLOOD PRESSURE: 148 MMHG | HEIGHT: 78 IN | BODY MASS INDEX: 36.45 KG/M2 | DIASTOLIC BLOOD PRESSURE: 92 MMHG | RESPIRATION RATE: 14 BRPM | OXYGEN SATURATION: 94 %

## 2024-01-30 DIAGNOSIS — L40.53 PSORIATIC SPONDYLITIS: Primary | ICD-10-CM

## 2024-01-30 DIAGNOSIS — Z79.899 HIGH RISK MEDICATION USE: ICD-10-CM

## 2024-01-30 DIAGNOSIS — L40.9 PSORIASIS: ICD-10-CM

## 2024-01-30 PROCEDURE — 3077F SYST BP >= 140 MM HG: CPT | Mod: ,,, | Performed by: NURSE PRACTITIONER

## 2024-01-30 PROCEDURE — 99213 OFFICE O/P EST LOW 20 MIN: CPT | Mod: PBBFAC | Performed by: NURSE PRACTITIONER

## 2024-01-30 PROCEDURE — 3008F BODY MASS INDEX DOCD: CPT | Mod: ,,, | Performed by: NURSE PRACTITIONER

## 2024-01-30 PROCEDURE — 99214 OFFICE O/P EST MOD 30 MIN: CPT | Mod: S$PBB,,, | Performed by: NURSE PRACTITIONER

## 2024-01-30 PROCEDURE — 3080F DIAST BP >= 90 MM HG: CPT | Mod: ,,, | Performed by: NURSE PRACTITIONER

## 2024-01-31 NOTE — PROGRESS NOTES
"    RHEUMATOLOGY OUTPATIENT CLINIC NOTE    Subjective:       Patient ID: Jason Chaudhary is a 48 y.o. male.    Chief Complaint: Disease Management (Here for fu had cardiac ablation last week )       History of Present Illness: 46 y/o male presents to the clinic today to follow up on monitoring labs while taking Tremfya. No labs drawn prior to visit. States that overall patient cannot tell any difference with joint pain or discomfort with Tremfya. Endorses right shoulder pain and right knee pain. States that Mobic was helping with joint pain but stopped Mobic after A-Flutter episode with successful cardioversion to NSR and being started on Eliquis 5 mg BID. States that he has a cardiac ablation procedure scheduled for 1/2/2023 with Dr. Alvarado in [place], [place]. See picture below for psoriasis area to left calf. States that this area started at golf ball size in 2001 and has doubled in size every year and endorses itching and redness. Denies any exacerbating factors. Previously tried and failed Humira but states that he never had joint pain/stiffness relief or skin relief from psoriasis. Denies any other places where he has psoriasis. Previously saw Dr. Ramirez with Dermatology in 2021 for other problem unrelated to psoriasis.    01/30/2024: Presents to the clinic today for 3 month follow up. Is still taking Tremfya as prescribed. States that he underwent cardiac ablation in [place], [place] two weeks ago. Currently, no problems post op. BP is elevated today. States that it has been running slightly elevated since procedure. Follows with Dr. Cal Hu at [facility] for management. Psoriasis to left calf area remains the same with no improvement. States that joints are "ok".     Past Medical History:   Diagnosis Date    Allergy     Hypertension     NEGRITA (obstructive sleep apnea)     Paroxysmal atrial fibrillation     Psoriasis      Social History     Tobacco Use    Smoking status: Never     Passive " "exposure: Never    Smokeless tobacco: Never   Substance Use Topics    Alcohol use: Never     Review of patient's allergies indicates:   Allergen Reactions    Pcn [penicillins]     Penicillin g potassium Rash and Nausea And Vomiting       Objective:   BP (!) 148/92   Pulse 77   Resp 14   Ht 6' 6" (1.981 m)   Wt (!) 154.8 kg (341 lb 3.2 oz)   SpO2 (!) 94%   BMI 39.43 kg/m²     Immunization History   Administered Date(s) Administered    COVID-19, MRNA, LN-S, PF (Pfizer) (Purple Cap) 03/18/2021, 04/08/2021, 12/13/2021    COVID-19, mRNA, LNP-S, bivalent booster, PF (Moderna Omicron)12 + YEARS 11/30/2022    Influenza - Quadrivalent - PF *Preferred* (6 months and older) 11/24/2021, 11/01/2022     Current Outpatient Medications   Medication Instructions    apixaban (ELIQUIS) 5 mg, Oral, 2 times daily    chlorpheniramine-phenylephrine 4-10 mg per tablet 1 tablet, Oral, Every 4 hours PRN    fexofenadine (ALLEGRA) 180 mg, Oral, Daily    furosemide (LASIX) 20 mg, Oral, Daily    guselkumab (TREMFYA) 100 mg/mL AtIn 100mg SC x1 on wk 0, 4 then q8wks    metoprolol succinate (TOPROL-XL) 50 mg, Oral, Daily    multivit-min-folic acid-vit K (MULTI FOR HIM, NO IRON,) 400-40 mcg Cap as directed Orally    naproxen sodium (ANAPROX) 220 MG tablet 1 tablet with food or milk as needed Orally every 12 hrs    potassium chloride (K-TAB) 20 mEq 20 mEq, Oral, 2 times daily, for three days      BIOLOGIC LABS  Lab Results   Component Value Date    HEPCAB Non-Reactive 08/25/2022      RHEUM LABS  Lab Results   Component Value Date    CRP 0.56 08/25/2022     PREVENTATIVE MEDICINE  Cancer screenings/skin cancer screen: Colonoscopy 08/23/2023  Contraception: n/a    Assessment:     Review of Systems   Constitutional:  Negative for fever.   Eyes:  Negative for redness.   Respiratory:  Negative for shortness of breath.    Cardiovascular:  Negative for chest pain.   Gastrointestinal:  Negative for constipation and diarrhea.   Musculoskeletal:  Negative " for falls.   Skin:  Negative for rash.   Neurological:  Positive for headaches.   Endo/Heme/Allergies:  Does not bruise/bleed easily.        Physical Exam   Constitutional: No distress.   Cardiovascular: Normal rate.   Pulmonary/Chest: Effort normal.   Musculoskeletal:         General: Normal range of motion.      Cervical back: No rigidity.   Neurological: He is alert.   Skin: Rash noted.   Psoriasis to left calf area (see picture)   Psychiatric: His behavior is normal. Mood normal.   Vitals reviewed.       No diagnosis found.      I have reviewed the following:     Details / Date    [x]   Labs CBC & CMP reviewed    []   Micro     []   Pathology     []   Imaging     []   Cardiology Procedures     [x]   Other Chart Review        States that psoriasis to left calf area initially started as small golf ball size that doubles in size every year and is now to this point shown above in the picture. States that so far Humira nor Tremfya has helped it.     Plan:     Continue Tremfya for now  Follow up with Dr. Isabelle Day (discuss switching to Rinvoq-- will be s/p 2 month cardiac ablation at that time)      30 minutes of total time spent on the encounter, which includes face to face time and non-face to face time preparing to see the patient (eg, review of tests), Obtaining and/or reviewing separately obtained history, Documenting clinical information in the electronic or other health record, Independently interpreting results (not separately reported) and communicating results to the patient/family/caregiver, or Care coordination (not separately reported).            Shelly Tracey, YOLA  [facility]  [address] [facility] [address]  [phone]

## 2024-02-27 ENCOUNTER — OFFICE VISIT (OUTPATIENT)
Dept: FAMILY MEDICINE | Facility: CLINIC | Age: 49
End: 2024-02-27
Payer: COMMERCIAL

## 2024-02-27 VITALS
OXYGEN SATURATION: 96 % | HEIGHT: 78 IN | TEMPERATURE: 98 F | HEART RATE: 74 BPM | WEIGHT: 315 LBS | BODY MASS INDEX: 36.45 KG/M2 | DIASTOLIC BLOOD PRESSURE: 80 MMHG | SYSTOLIC BLOOD PRESSURE: 139 MMHG

## 2024-02-27 DIAGNOSIS — D84.821 IMMUNODEFICIENCY DUE TO LONG TERM DRUG THERAPY: Chronic | ICD-10-CM

## 2024-02-27 DIAGNOSIS — L40.53 PSORIATIC SPONDYLITIS: Chronic | ICD-10-CM

## 2024-02-27 DIAGNOSIS — I10 ESSENTIAL HYPERTENSION: Primary | Chronic | ICD-10-CM

## 2024-02-27 DIAGNOSIS — Z79.899 IMMUNODEFICIENCY DUE TO LONG TERM DRUG THERAPY: Chronic | ICD-10-CM

## 2024-02-27 DIAGNOSIS — I48.91 ATRIAL FIBRILLATION WITH RVR: Chronic | ICD-10-CM

## 2024-02-27 PROCEDURE — 82570 ASSAY OF URINE CREATININE: CPT | Mod: ,,, | Performed by: CLINICAL MEDICAL LABORATORY

## 2024-02-27 PROCEDURE — 1160F RVW MEDS BY RX/DR IN RCRD: CPT | Mod: ,,, | Performed by: FAMILY MEDICINE

## 2024-02-27 PROCEDURE — 3061F NEG MICROALBUMINURIA REV: CPT | Mod: ,,, | Performed by: FAMILY MEDICINE

## 2024-02-27 PROCEDURE — 3075F SYST BP GE 130 - 139MM HG: CPT | Mod: ,,, | Performed by: FAMILY MEDICINE

## 2024-02-27 PROCEDURE — 3079F DIAST BP 80-89 MM HG: CPT | Mod: ,,, | Performed by: FAMILY MEDICINE

## 2024-02-27 PROCEDURE — 3066F NEPHROPATHY DOC TX: CPT | Mod: ,,, | Performed by: FAMILY MEDICINE

## 2024-02-27 PROCEDURE — 82043 UR ALBUMIN QUANTITATIVE: CPT | Mod: ,,, | Performed by: CLINICAL MEDICAL LABORATORY

## 2024-02-27 PROCEDURE — 1159F MED LIST DOCD IN RCRD: CPT | Mod: ,,, | Performed by: FAMILY MEDICINE

## 2024-02-27 PROCEDURE — 99214 OFFICE O/P EST MOD 30 MIN: CPT | Mod: ,,, | Performed by: FAMILY MEDICINE

## 2024-02-27 PROCEDURE — 3008F BODY MASS INDEX DOCD: CPT | Mod: ,,, | Performed by: FAMILY MEDICINE

## 2024-02-27 NOTE — PROGRESS NOTES
Subjective     Patient ID: Jason Chaudhary is a 48 y.o. male.    Chief Complaint: Color Me Healthy (2 of 2 [facility] for Hypertension. )    HPI  Review of Systems   Constitutional:  Negative for activity change, appetite change, chills, fatigue and fever.   HENT:  Negative for nasal congestion, ear pain, hearing loss, postnasal drip and sore throat.    Respiratory:  Negative for cough, chest tightness, shortness of breath and wheezing.    Cardiovascular:  Negative for chest pain, palpitations, leg swelling and claudication.   Gastrointestinal:  Negative for abdominal pain, change in bowel habit, constipation, diarrhea, nausea and vomiting.   Genitourinary:  Negative for dysuria.   Musculoskeletal:  Negative for arthralgias, back pain, gait problem and myalgias.   Integumentary:  Negative for rash.   Neurological:  Negative for weakness and headaches.   Psychiatric/Behavioral:  Negative for suicidal ideas. The patient is not nervous/anxious.        Tobacco Use: Low Risk  (2/29/2024)    Patient History     Smoking Tobacco Use: Never     Smokeless Tobacco Use: Never     Passive Exposure: Never     Review of patient's allergies indicates:   Allergen Reactions    Penicillins Hives    Penicillin g potassium Rash and Nausea And Vomiting     Current Outpatient Medications   Medication Instructions    apixaban (ELIQUIS) 5 mg, Oral, 2 times daily    fexofenadine (ALLEGRA) 180 mg, Oral, Daily    furosemide (LASIX) 20 mg, Oral, Daily    metoprolol succinate (TOPROL-XL) 50 mg, Oral, Daily    multivit-min-folic acid-vit K (MULTI FOR HIM, NO IRON,) 400-40 mcg Cap as directed Orally     Medications Discontinued During This Encounter   Medication Reason    guselkumab (TREMFYA) 100 mg/mL AtIn Patient no longer taking    naproxen sodium (ANAPROX) 220 MG tablet Patient no longer taking    chlorpheniramine-phenylephrine 4-10 mg per tablet Patient no longer taking    potassium chloride (K-TAB) 20 mEq Patient no longer taking       Past  "Medical History:   Diagnosis Date    Allergy     Hypertension     NEGRITA (obstructive sleep apnea)     Paroxysmal atrial fibrillation     Psoriasis      Health Maintenance Topics with due status: Not Due       Topic Last Completion Date    Lipid Panel 04/27/2023    Hemoglobin A1c (Diabetic Prevention Screening) 08/16/2023    Colorectal Cancer Screening 08/23/2023     Immunization History   Administered Date(s) Administered    COVID-19, MRNA, LN-S, PF (Pfizer) (Purple Cap) 03/18/2021, 04/08/2021, 12/13/2021    COVID-19, mRNA, LNP-S, bivalent booster, PF (Moderna Omicron)12 + YEARS 11/30/2022    Influenza - Quadrivalent - PF *Preferred* (6 months and older) 11/24/2021, 11/01/2022       Objective     Body mass index is 39.75 kg/m².  Wt Readings from Last 3 Encounters:   02/27/24 (!) 156 kg (344 lb)   01/30/24 (!) 154.8 kg (341 lb 3.2 oz)   11/28/23 (!) 154.5 kg (340 lb 9.6 oz)     Ht Readings from Last 3 Encounters:   02/27/24 6' 6" (1.981 m)   01/30/24 6' 6" (1.981 m)   11/28/23 6' 6" (1.981 m)     BP Readings from Last 3 Encounters:   02/27/24 139/80   01/30/24 (!) 148/92   11/28/23 138/88     Temp Readings from Last 3 Encounters:   02/27/24 98 °F (36.7 °C) (Oral)   11/28/23 99.4 °F (37.4 °C) (Oral)   11/09/23 98 °F (36.7 °C) (Oral)     Pulse Readings from Last 3 Encounters:   02/27/24 74   01/30/24 77   11/28/23 105     Resp Readings from Last 3 Encounters:   01/30/24 14   11/28/23 16   11/09/23 20     PF Readings from Last 3 Encounters:   No data found for PF       Physical Exam  Vitals and nursing note reviewed.   Constitutional:       General: He is not in acute distress.     Appearance: Normal appearance. He is not ill-appearing.   Eyes:      Extraocular Movements: Extraocular movements intact.      Pupils: Pupils are equal, round, and reactive to light.   Cardiovascular:      Rate and Rhythm: Normal rate and regular rhythm.      Heart sounds: Normal heart sounds.   Pulmonary:      Effort: Pulmonary effort is " normal.      Breath sounds: Normal breath sounds.   Abdominal:      General: Bowel sounds are normal.      Palpations: Abdomen is soft.   Musculoskeletal:         General: Normal range of motion.   Skin:     Findings: No rash.   Neurological:      General: No focal deficit present.      Mental Status: He is alert and oriented to person, place, and time. Mental status is at baseline.   Psychiatric:         Mood and Affect: Mood normal.         Behavior: Behavior normal.         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     Goal Actions:    What type of visit is the patient here for today?: Color Me Healthy  Which Color Me Healthy program is the patient enrolling in?: Blood Pressure (Hypertension)  Is this a Wellness Follow Up?: No  What is your overall wellness goal? (select at least one): Improve overall health  Choose 3: Lifestyle, Nutrition, Exercise  Lifestyle Actions : Take medications as prescribed  Nurtrition Actions: Eat a well-balanced diet  Exercise Actions: Recommend physical activity 30 minutes per day 3-5 times/week

## 2024-02-27 NOTE — PATIENT INSTRUCTIONS
"Patient Education       High Blood Pressure in Adults   The Basics   Written by the doctors and editors at [facility]   What is high blood pressure? -- High blood pressure is a condition that puts you at risk for heart attack, stroke, and kidney disease. It does not usually cause symptoms. But it can be serious.  When your doctor or nurse tells you your blood pressure, they will say 2 numbers. For instance, your doctor or nurse might say that your blood pressure is "130 over 80." The top number is the pressure inside your arteries when your heart is ale. The bottom number is the pressure inside your arteries when your heart is relaxed.  "Elevated blood pressure" is a term doctors or nurses use as a warning. People with elevated blood pressure do not yet have high blood pressure. But their blood pressure is not as low as it should be for good health.  Many experts define high, elevated, and normal blood pressure as follows:  High - Top number of 130 or above and/or bottom number of 80 or above  Elevated - Top number between 120 and 129 and bottom number of 79 or below  Normal - Top number of 119 or below and bottom number of 79 or below  This information is also in the table (table 1).   How can I lower my blood pressure? -- If your doctor or nurse has prescribed blood pressure medicine, the most important thing you can do is to take it. If it causes side effects, do not just stop taking it. Instead, talk to your doctor or nurse about the problems it causes. They might be able to lower your dose or switch you to another medicine. If cost is a problem, mention that too. They might be able to put you on a less expensive medicine. Taking your blood pressure medicine can keep you from having a heart attack or stroke, and it can save your life!  Can I do anything on my own? -- You have a lot of control over your blood pressure. To lower it:  Lose weight (if you are overweight)  Choose a diet low in fat and rich in " "fruits, vegetables, and low-fat dairy products  Reduce the amount of salt you eat  Do something active for at least 30 minutes a day on most days of the week  Cut down on alcohol (if you drink more than 2 alcoholic drinks per day)  It's also a good idea to get a home blood pressure meter. People who check their own blood pressure at home do better at keeping it low and can sometimes even reduce the amount of medicine they take.  All topics are updated as new evidence becomes available and our peer review process is complete.  This topic retrieved from [employer] on: Sep 21, 2021.  Topic 07446 Version 15.0  Release: 29.4.2 - C29.263  © 2021 UpToDate, Inc. and/or its affiliates. All rights reserved.  table 1: Definition of normal and high blood pressure  Level  Top number  Bottom number    High 130 or above 80 or above   Elevated 120 to 129 79 or below   Normal 119 or below 79 or below   These definitions are from the American College of Cardiology/American Heart Association. Other expert groups might use slightly different definitions.  "Elevated blood pressure" is a term doctor or nurses use as a warning. It means you do not yet have high blood pressure, but your blood pressure is not as low as it should be for good health.  Graphic 95404 Version 6.0  Consumer Information Use and Disclaimer   This information is not specific medical advice and does not replace information you receive from your health care provider. This is only a brief summary of general information. It does NOT include all information about conditions, illnesses, injuries, tests, procedures, treatments, therapies, discharge instructions or life-style choices that may apply to you. You must talk with your health care provider for complete information about your health and treatment options. This information should not be used to decide whether or not to accept your health care provider's advice, instructions or recommendations. Only your health care " provider has the knowledge and training to provide advice that is right for you. The use of this information is governed by the [employer] End User License Agreement, available at https://www.IsoPlexis.Mappyfriends/en/solutions/Proximex/about/asim.The use of Drive content is governed by the Drive Terms of Use. ©2021 UpToDate, Inc. All rights reserved.  Copyright   © 2021 UpToDate, Inc. and/or its affiliates. All rights reserved.    Patient Education       Diet and Health   The Basics   Written by the doctors and editors at [facility]   Why is it important to eat a healthy diet? -- It's important to eat a healthy diet because eating the right foods can keep you healthy now and later on in life.  Which foods are especially healthy? -- Foods that are especially healthy include:  Fruits and vegetables - Eating a diet with lots of fruits and vegetables can help prevent heart disease and strokes. It might also help prevent certain types of cancers. Try to eat fruits and vegetables at each meal and also for snacks. If you don't have fresh fruits and vegetables available, you can eat frozen or canned ones instead. Doctors recommend eating at least 2 1/2 servings of vegetables and 2 servings of fruits each day.  Foods with fiber - Eating foods with a lot of fiber can help prevent heart disease and strokes. If you have type 2 diabetes, it can also help control your blood sugar. Foods that have a lot of fiber include vegetables, fruits, beans, nuts, oatmeal, and whole grain breads and cereals. You can tell how much fiber is in a food by reading the nutrition label (figure 1). Doctors recommend eating 25 to 36 grams of fiber each day.  Foods with folate (also called folic acid) - Folate is a vitamin that is important for pregnant people, since it helps prevent certain birth defects. Anyone who could get pregnant should get at least 400 micrograms of folic acid daily, whether or not they are actively trying to get pregnant. Folate  "is found in many breakfast cereals, oranges, orange juice, and green leafy vegetables.  Foods with calcium and vitamin D - Babies, children, and adults need calcium and vitamin D to help keep their bones strong. Adults also need calcium and vitamin D to help prevent osteoporosis. Osteoporosis is a condition that causes bones to get "thin" and break more easily than usual. Different foods and drinks have calcium and vitamin D in them (figure 2). People who don't get enough calcium and vitamin D in their diet might need to take a supplement.  Foods with protein - Protein helps your muscles stay strong. Healthy foods with a lot of protein include chicken, fish, eggs, beans, nuts, and soy products. Red meat also has a lot of protein, but it also contains fats, which can be unhealthy.  Some experts recommend a "Mediterranean diet." This involves eating a lot of fruits, vegetables, nuts, whole grains, and olive oil. It also includes some fish, poultry, and dairy products, but not a lot of red meat. Eating this way can help your overall health, and might even lower your risk of having a stroke.  What foods should I avoid or limit? -- To eat a healthy diet, there are some things you should avoid or limit. They include:   Fats - There are different types of fats. Some types of fats are better for your body than others.  Trans fats are especially unhealthy. They are found in margarines, many fast foods, and some store-bought baked goods. Trans fats can raise your cholesterol level and your increase your chance of getting heart disease. You should avoid eating foods with these types of fats.  The type of "polyunsaturated" fats found in fish seems to be healthy and can reduce your chance of getting heart disease. Other polyunsaturated fats might also be good for your health. When you cook, it's best to use oils with healthier fats, such as olive oil and canola oil.  Sugar - To have a healthy diet, it's important to limit or " "avoid sugar, sweets, and refined grains. Refined grains are found in white bread, white rice, most forms of pasta, and most packaged "snack" foods. Whole grains, such as whole-wheat bread and brown rice, have more fiber and are better for your health.  Avoiding sugar-sweetened beverages, like soda and sports drinks, can also help improve your health.  Red meat - Studies have shown that eating a lot of red meat can increase your risk of certain health problems, including heart disease and cancer. You should limit the amount of red meat that you eat.  Can I drink alcohol as part of a healthy diet? -- People who drink a small amount of alcohol each day might have a lower chance of getting heart disease. But drinking alcohol can lead to problems. For example, it can raise a person's chances of getting liver disease and certain types of cancers. In women, even 1 drink a day can increase the risk of getting breast cancer.  Most doctors recommend that adult women not have more than 1 drink a day and that adult men not have more than 2 drinks a day. The limits are different because most women's bodies take longer to break down alcohol.  How many calories do I need each day? -- The number of calories you need each day depends on your weight, height, age, sex, and how active you are.  Your doctor or nurse can tell you how many calories you should eat each day. If you are trying to lose weight, you should eat fewer calories each day.  What if I have questions? -- If you have questions about which foods you should or should not eat, ask your doctor or nurse. The right diet for you will depend, in part, on your health and any medical conditions you have.  All topics are updated as new evidence becomes available and our peer review process is complete.  This topic retrieved from [employer] on: Sep 21, 2021.  Topic 68755 Version 20.0  Release: 29.4.2 - C29.263  © 2021 UpToDate, Inc. and/or its affiliates. All rights " "reserved.  figure 1: Nutrition label - fiber     This is an example of a nutrition label. To figure out how much fiber is in a food, look for the line that says "Dietary Fiber." It's also important to look at the serving size. This food has 7 grams of fiber in each serving, and each serving is 1 cup.  Graphic 53481 Version 7.0    figure 2: Foods and drinks with calcium and vitamin D     Foods rich in calcium include ice cream, soy milk, breads, kale, broccoli, milk, cheese, cottage cheese, almonds, yogurt, ready-to-eat cereals, beans, and tofu. Foods rich in vitamin D include milk, fortified plant-based "milks" (soy, almond), canned tuna fish, cod liver oil, yogurt, ready-to-eat-cereals, cooked salmon, canned sardines, mackerel, and eggs. Some of these foods are rich in both.  Graphic 81305 Version 4.0    Consumer Information Use and Disclaimer   This information is not specific medical advice and does not replace information you receive from your health care provider. This is only a brief summary of general information. It does NOT include all information about conditions, illnesses, injuries, tests, procedures, treatments, therapies, discharge instructions or life-style choices that may apply to you. You must talk with your health care provider for complete information about your health and treatment options. This information should not be used to decide whether or not to accept your health care provider's advice, instructions or recommendations. Only your health care provider has the knowledge and training to provide advice that is right for you. The use of this information is governed by the [employer] End User License Agreement, available at https://www.scanR.moka5/en/solutions/LMN-1/about/asim.The use of menuvox content is governed by the menu[employer]ox Terms of Use. ©2021 UpToDate, Inc. All rights reserved.  Copyright   © 2021 UpToDate, Inc. and/or its affiliates. All rights reserved.    "

## 2024-02-28 ENCOUNTER — PATIENT OUTREACH (OUTPATIENT)
Dept: ADMINISTRATIVE | Facility: HOSPITAL | Age: 49
End: 2024-02-28

## 2024-02-28 LAB
CREAT UR-MCNC: 227 MG/DL (ref 39–259)
MICROALBUMIN UR-MCNC: 2.4 MG/DL (ref 0–2.8)
MICROALBUMIN/CREAT RATIO PNL UR: 10.6 MG/G (ref 0–30)

## 2024-02-28 NOTE — PROGRESS NOTES
Population Health Chart Review & Patient Outreach Details      Further Action Needed If Patient Returns Outreach:            Updates Requested / Reviewed:     []  Care Everywhere    []     []  External Sources ([facility], [facility], DIS, etc.)    [] [facility]   [] [facility]   [] Other:    []  Care Team Updated   []  Removed  or Duplicate Orders   []  Immunization Reconciliation Completed / Queried    [] [place]   [] [place]   [] [place]   [] [place]      Health Maintenance Topics Addressed and Outreach Outcomes / Actions Taken:             Breast Cancer Screening []  Mammogram Order Placed    []  Mammogram Screening Scheduled    []  External Records Requested & Care Team Updated if Applicable    []  External Records Uploaded & Care Team Updated if Applicable    []  Pt Declined Scheduling Mammogram    []  Pt Will Schedule with External Provider / Order Routed & Care Team Updated if Applicable              Cervical Cancer Screening []  Pap Smear Scheduled in Primary Care or OBGYN    []  External Records Requested & Care Team Updated if Applicable       []  External Records Uploaded, Care Team Updated, & History Updated if Applicable    []  Patient Declined Scheduling Pap Smear    []  Patient Will Schedule with External Provider & Care Team Updated if Applicable                  Colorectal Cancer Screening []  Colonoscopy Case Request / Referral / Home Test Order Placed    []  External Records Requested & Care Team Updated if Applicable    []  External Records Uploaded, Care Team Updated, & History Updated if Applicable    []  Patient Declined Completing Colon Cancer Screening    []  Patient Will Schedule with External Provider & Care Team Updated if Applicable    []  Fit Kit Mailed (add the SmartPhrase under additional notes)    []  Reminded Patient to Complete Home Test                Diabetic Eye Exam []  Eye Exam Screening Order Placed    []  Eye Camera Scheduled or Optometry/Ophthalmology Referral  Placed    []  External Records Requested & Care Team Updated if Applicable    []  External Records Uploaded, Care Team Updated, & History Updated if Applicable    []  Patient Declined Scheduling Eye Exam    []  Patient Will Schedule with External Provider & Care Team Updated if Applicable             Blood Pressure Control []  Primary Care Follow Up Visit Scheduled     []  Remote Blood Pressure Reading Captured    []  Patient Declined Remote Reading or Scheduling Appt - Escalated to PCP    []  Patient Will Call Back or Send Portal Message with Reading                 HbA1c & Other Labs []  Overdue Lab(s) Ordered    []  Overdue Lab(s) Scheduled    []  External Records Uploaded & Care Team Updated if Applicable    []  Primary Care Follow Up Visit Scheduled     []  Reminded Patient to Complete A1c Home Test    []  Patient Declined Scheduling Labs or Will Call Back to Schedule    []  Patient Will Schedule with External Provider / Order Routed, & Care Team Updated if Applicable           Primary Care Appointment []  Primary Care Appt Scheduled    []  Patient Declined Scheduling or Will Call Back to Schedule    []  Pt Established with External Provider, Updated Care Team, & Informed Pt to Notify Payor if Applicable           Medication Adherence /    Statin Use []  Primary Care Appointment Scheduled    []  Patient Reminded to  Prescription    []  Patient Declined, Provider Notified if Needed    []  Sent Provider Message to Review to Evaluate Pt for Statin, Add Exclusion Dx Codes, Document   Exclusion in Problem List, Change Statin Intensity Level to Moderate or High Intensity if Applicable                Osteoporosis Screening []  Dexa Order Placed    []  Dexa Appointment Scheduled    []  External Records Requested & Care Team Updated    []  External Records Uploaded, Care Team Updated, & History Updated if Applicable    []  Patient Declined Scheduling Dexa or Will Call Back to Schedule    []  Patient Will Schedule  with External Provider / Order Routed & Care Team Updated if Applicable       Additional Notes:.  Post visit Population Health review of encounter with date of service  2/27/24 with Johnson.  All required HY components in encounter.  Chart is opened all there.[place]  Followup appt for: 5/3/24 HY

## 2024-02-29 PROBLEM — R00.0 TACHYCARDIA: Status: RESOLVED | Noted: 2023-08-08 | Resolved: 2024-02-29

## 2024-02-29 PROBLEM — I48.92 ATRIAL FLUTTER: Chronic | Status: ACTIVE | Noted: 2023-08-08

## 2024-02-29 PROBLEM — D84.821 IMMUNODEFICIENCY DUE TO LONG TERM DRUG THERAPY: Chronic | Status: ACTIVE | Noted: 2023-11-07

## 2024-02-29 PROBLEM — Z79.899 IMMUNODEFICIENCY DUE TO LONG TERM DRUG THERAPY: Chronic | Status: ACTIVE | Noted: 2023-11-07

## 2024-02-29 PROBLEM — Z12.11 SCREENING FOR COLON CANCER: Status: RESOLVED | Noted: 2023-08-23 | Resolved: 2024-02-29

## 2024-02-29 PROBLEM — R07.9 CHEST PAIN: Status: RESOLVED | Noted: 2023-08-16 | Resolved: 2024-02-29

## 2024-02-29 PROBLEM — E87.6 HYPOKALEMIA: Status: RESOLVED | Noted: 2023-08-17 | Resolved: 2024-02-29

## 2024-03-05 ENCOUNTER — PATIENT OUTREACH (OUTPATIENT)
Dept: ADMINISTRATIVE | Facility: HOSPITAL | Age: 49
End: 2024-03-05

## 2024-03-05 NOTE — PROGRESS NOTES
Population Health Chart Review & Patient Outreach Details  Per [facility] website, insurance is active and pt is listed on the attributed list needing a healthy you performed in   Hy scheduled for 5/3/2024  [facility] 2of2 completed      Further Action Needed If Patient Returns Outreach:            Updates Requested / Reviewed:     []  Care Everywhere    []     []  External Sources ([facility], [facility], DIS, etc.)    [] [facility]   [] [facility]   [] Other:    []  Care Team Updated   []  Removed  or Duplicate Orders   []  Immunization Reconciliation Completed / Queried    [] [place]   [] [place]   [] [place]   [] [place]      Health Maintenance Topics Addressed and Outreach Outcomes / Actions Taken:             Breast Cancer Screening []  Mammogram Order Placed    []  Mammogram Screening Scheduled    []  External Records Requested & Care Team Updated if Applicable    []  External Records Uploaded & Care Team Updated if Applicable    []  Pt Declined Scheduling Mammogram    []  Pt Will Schedule with External Provider / Order Routed & Care Team Updated if Applicable              Cervical Cancer Screening []  Pap Smear Scheduled in Primary Care or OBGYN    []  External Records Requested & Care Team Updated if Applicable       []  External Records Uploaded, Care Team Updated, & History Updated if Applicable    []  Patient Declined Scheduling Pap Smear    []  Patient Will Schedule with External Provider & Care Team Updated if Applicable                  Colorectal Cancer Screening []  Colonoscopy Case Request / Referral / Home Test Order Placed    []  External Records Requested & Care Team Updated if Applicable    []  External Records Uploaded, Care Team Updated, & History Updated if Applicable    []  Patient Declined Completing Colon Cancer Screening    []  Patient Will Schedule with External Provider & Care Team Updated if Applicable    []  Fit Kit Mailed (add the SmartPhrase under additional notes)    []  Reminded  Patient to Complete Home Test                Diabetic Eye Exam []  Eye Exam Screening Order Placed    []  Eye Camera Scheduled or Optometry/Ophthalmology Referral Placed    []  External Records Requested & Care Team Updated if Applicable    []  External Records Uploaded, Care Team Updated, & History Updated if Applicable    []  Patient Declined Scheduling Eye Exam    []  Patient Will Schedule with External Provider & Care Team Updated if Applicable             Blood Pressure Control []  Primary Care Follow Up Visit Scheduled     []  Remote Blood Pressure Reading Captured    []  Patient Declined Remote Reading or Scheduling Appt - Escalated to PCP    []  Patient Will Call Back or Send Portal Message with Reading                 HbA1c & Other Labs []  Overdue Lab(s) Ordered    []  Overdue Lab(s) Scheduled    []  External Records Uploaded & Care Team Updated if Applicable    []  Primary Care Follow Up Visit Scheduled     []  Reminded Patient to Complete A1c Home Test    []  Patient Declined Scheduling Labs or Will Call Back to Schedule    []  Patient Will Schedule with External Provider / Order Routed, & Care Team Updated if Applicable           Primary Care Appointment []  Primary Care Appt Scheduled    []  Patient Declined Scheduling or Will Call Back to Schedule    []  Pt Established with External Provider, Updated Care Team, & Informed Pt to Notify Payor if Applicable           Medication Adherence /    Statin Use []  Primary Care Appointment Scheduled    []  Patient Reminded to  Prescription    []  Patient Declined, Provider Notified if Needed    []  Sent Provider Message to Review to Evaluate Pt for Statin, Add Exclusion Dx Codes, Document   Exclusion in Problem List, Change Statin Intensity Level to Moderate or High Intensity if Applicable                Osteoporosis Screening []  Dexa Order Placed    []  Dexa Appointment Scheduled    []  External Records Requested & Care Team Updated    []  External  Records Uploaded, Care Team Updated, & History Updated if Applicable    []  Patient Declined Scheduling Dexa or Will Call Back to Schedule    []  Patient Will Schedule with External Provider / Order Routed & Care Team Updated if Applicable       Additional Notes:

## 2024-03-14 ENCOUNTER — OFFICE VISIT (OUTPATIENT)
Dept: FAMILY MEDICINE | Facility: CLINIC | Age: 49
End: 2024-03-14
Payer: COMMERCIAL

## 2024-03-14 VITALS
HEIGHT: 78 IN | SYSTOLIC BLOOD PRESSURE: 158 MMHG | HEART RATE: 71 BPM | BODY MASS INDEX: 36.45 KG/M2 | RESPIRATION RATE: 20 BRPM | DIASTOLIC BLOOD PRESSURE: 89 MMHG | WEIGHT: 315 LBS | OXYGEN SATURATION: 96 % | TEMPERATURE: 99 F

## 2024-03-14 DIAGNOSIS — R05.9 COUGH, UNSPECIFIED TYPE: ICD-10-CM

## 2024-03-14 DIAGNOSIS — L40.53 PSORIATIC SPONDYLITIS: Chronic | ICD-10-CM

## 2024-03-14 DIAGNOSIS — D84.821 IMMUNODEFICIENCY DUE TO LONG TERM DRUG THERAPY: Chronic | ICD-10-CM

## 2024-03-14 DIAGNOSIS — Z79.899 IMMUNODEFICIENCY DUE TO LONG TERM DRUG THERAPY: Chronic | ICD-10-CM

## 2024-03-14 DIAGNOSIS — I10 ESSENTIAL HYPERTENSION: Chronic | ICD-10-CM

## 2024-03-14 DIAGNOSIS — R50.9 FEVER, UNSPECIFIED FEVER CAUSE: ICD-10-CM

## 2024-03-14 DIAGNOSIS — J06.9 UPPER RESPIRATORY TRACT INFECTION, UNSPECIFIED TYPE: Primary | ICD-10-CM

## 2024-03-14 RX ORDER — CEFTRIAXONE 1 G/1
1 INJECTION, POWDER, FOR SOLUTION INTRAMUSCULAR; INTRAVENOUS
Status: COMPLETED | OUTPATIENT
Start: 2024-03-14 | End: 2024-03-14

## 2024-03-14 RX ORDER — CEFUROXIME AXETIL 500 MG/1
500 TABLET ORAL EVERY 12 HOURS
Qty: 14 TABLET | Refills: 0 | Status: SHIPPED | OUTPATIENT
Start: 2024-03-14 | End: 2024-05-03

## 2024-03-14 RX ADMIN — CEFTRIAXONE 1 G: 1 INJECTION, POWDER, FOR SOLUTION INTRAMUSCULAR; INTRAVENOUS at 02:03

## 2024-03-14 NOTE — PROGRESS NOTES
Spike Johnson MD    [address] [place] [address]     PATIENT NAME: Jason Chaudhary  : 1975  DATE: 3/14/24  MRN: [id number]      Billing Provider: Spike Johnson MD  Level of Service: NE OFFICE/OUTPT VISIT, EST, LEVL IV, 30-39 MIN  Patient PCP Information       Provider PCP Type    Spike Johnson MD General            Reason for Visit / Chief Complaint: Nasal Congestion, Sore Throat, nasal drainage, Headache, Fever (Symptoms started three days ago. Complain of nasal congestion, nasal drainage, sore throat, low grade fever, headache, productive cough, gray colored sputum. Mucinex for symptoms last night. ), and Cough       Update PCP  Update Chief Complaint         History of Present Illness / Problem Focused Workflow     Jason Chaudhary presents to the clinic with Nasal Congestion, Sore Throat, nasal drainage, Headache, Fever (Symptoms started three days ago. Complain of nasal congestion, nasal drainage, sore throat, low grade fever, headache, productive cough, gray colored sputum. Mucinex for symptoms last night. ), and Cough     HPI    Review of Systems     Review of Systems   Constitutional:  Positive for fatigue and fever. Negative for activity change, appetite change and chills.   HENT:  Positive for nasal congestion, sinus pressure/congestion and sore throat. Negative for ear pain, hearing loss and postnasal drip.    Respiratory:  Positive for cough. Negative for chest tightness, shortness of breath and wheezing.    Cardiovascular:  Negative for chest pain, palpitations, leg swelling and claudication.   Gastrointestinal:  Negative for abdominal pain, change in bowel habit, constipation, diarrhea, nausea and vomiting.   Genitourinary:  Negative for dysuria.   Musculoskeletal:  Negative for arthralgias, back pain, gait problem and myalgias.   Integumentary:  Negative for rash.   Neurological:  Positive for headaches.  Negative for weakness.   Psychiatric/Behavioral:  Negative for suicidal ideas. The patient is not nervous/anxious.         Medical / Social / Family History     Past Medical History:   Diagnosis Date    Allergy     Hypertension     NEGRITA (obstructive sleep apnea)     Paroxysmal atrial fibrillation     Psoriasis        Past Surgical History:   Procedure Laterality Date    ABLATION AND RECONSTRUCTION, CARDIAC ATRIUM, WITH LIMITED TISSUE ABLATION  01/25/2024    ECHOCARDIOGRAM,TRANSESOPHAGEAL N/A 08/16/2023    Procedure: Transesophageal echo (JUVENAL) intra-procedure log documentation;  Surgeon: Cal Stock MD;  Location: [facility] CATH LAB;  Service: Cardiology;  Laterality: N/A;    RECONSTRUCTION OF CHEST WALL Bilateral     TREATMENT OF CARDIAC ARRHYTHMIA N/A 08/16/2023    Procedure: Cardioversion or Defibrillation;  Surgeon: Cal Stock MD;  Location: [facility] CATH LAB;  Service: Cardiology;  Laterality: N/A;    WRIST SURGERY Left     cyst removal       Social History    reports that he has never smoked. He has never been exposed to tobacco smoke. He has never used smokeless tobacco. He reports that he does not drink alcohol and does not use drugs.    Family History  's family history includes Diabetes in his father; Heart failure in his father; Hypertension in his father and mother.    Medications and Allergies     Medications  Outpatient Medications Marked as Taking for the 3/14/24 encounter (Office Visit) with Spike Johnson MD   Medication Sig Dispense Refill    apixaban (ELIQUIS) 5 mg Tab Take 1 tablet (5 mg total) by mouth 2 (two) times daily. 90 tablet 3    fexofenadine (ALLEGRA) 180 MG tablet Take 180 mg by mouth once daily.      furosemide (LASIX) 20 MG tablet Take 1 tablet (20 mg total) by mouth once daily. 30 tablet 11    metoprolol succinate (TOPROL-XL) 50 MG 24 hr tablet Take 1 tablet (50 mg total) by mouth once daily. 90 tablet 3    multivit-min-folic acid-vit K (MULTI FOR HIM, NO IRON,) 400-40  mcg Cap as directed Orally         Allergies  Review of patient's allergies indicates:   Allergen Reactions    Penicillins Hives    Penicillin g potassium Rash and Nausea And Vomiting       Physical Examination     Vitals:    03/14/24 1328   BP: (!) 158/89   Pulse: 71   Resp: 20   Temp: 99.2 °F (37.3 °C)     Physical Exam  Vitals and nursing note reviewed.   Constitutional:       General: He is not in acute distress.     Appearance: Normal appearance. He is not ill-appearing.   Eyes:      Extraocular Movements: Extraocular movements intact.      Pupils: Pupils are equal, round, and reactive to light.   Cardiovascular:      Rate and Rhythm: Normal rate and regular rhythm.      Heart sounds: Normal heart sounds.   Pulmonary:      Effort: Pulmonary effort is normal.      Breath sounds: Normal breath sounds.   Abdominal:      General: Bowel sounds are normal.      Palpations: Abdomen is soft.   Musculoskeletal:         General: Normal range of motion.   Skin:     Findings: No rash.   Neurological:      General: No focal deficit present.      Mental Status: He is alert and oriented to person, place, and time. Mental status is at baseline.   Psychiatric:         Mood and Affect: Mood normal.         Behavior: Behavior normal.            Assessment and Plan (including Health Maintenance)      Problem List  Smart Sets  Document Outside HM   :    Plan:         Health Maintenance Due   Topic Date Due    Pneumococcal Vaccines (Age 0-64) (1 of 2 - PCV) Never done    TETANUS VACCINE  Never done    COVID-19 Vaccine (5 - 2023-24 season) 09/01/2023       Problem List Items Addressed This Visit          Cardiac/Vascular    Essential hypertension (Chronic)    Overview      - Goal blood pressure for most patients is less than 130/85. Both numbers are important. If you have questions about your specific goal blood pressure, please ask at your clinic visits.   - Check blood pressure outside of clinic, record the numbers, and bring the  log to all your office visits.   - If you have any chest pain, SOB, or other concerning symptoms, report these immediately, or go to the nearest ER.    - Recommend cardiovascular exercise, at least 3 times per week, for at least 15 minutes.   - Eat a heart healthy diet.               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)       Orthopedic    Psoriatic spondylitis (Chronic)     Other Visit Diagnoses       Upper respiratory tract infection, unspecified type    -  Primary    Cough, unspecified type        Relevant Orders    POCT COVID-19 Rapid Screening (Completed)    POCT Influenza A/B (Completed)    Fever, unspecified fever cause        Relevant Orders    POCT COVID-19 Rapid Screening (Completed)    POCT Influenza A/B (Completed)            Health Maintenance Topics with due status: Not Due       Topic Last Completion Date    Lipid Panel 04/27/2023    Hemoglobin A1c (Diabetic Prevention Screening) 08/16/2023    Colorectal Cancer Screening 08/23/2023       Procedures     Future Appointments   Date Time Provider Department Center   3/27/2024 11:00 AM Isabelle Day MD [facility]   4/17/2024 10:15 AM Cal Stock MD [facility]   5/3/2024 12:45 PM Spike Johnson MD [facility]   8/27/2024  3:30 PM Spike Johnson MD [facility]        Follow up if symptoms worsen or fail to improve.     Signature:  Spike Johnson MD  [address] [place] [address]  Phone #: [phone]  Fax #: [phone]    Date of encounter: 3/14/24    There are no Patient Instructions on file for this visit.

## 2024-03-14 NOTE — PROGRESS NOTES
Rocephin 1 gram IM to right dorsogluteal. Tolerated well, instructed to wait 15 minutes to monitor for any adverse reaction. Verbal understanding given.

## 2024-03-27 ENCOUNTER — OFFICE VISIT (OUTPATIENT)
Dept: RHEUMATOLOGY | Facility: CLINIC | Age: 49
End: 2024-03-27
Payer: COMMERCIAL

## 2024-03-27 DIAGNOSIS — L40.53 PSORIATIC SPONDYLITIS: Primary | ICD-10-CM

## 2024-03-27 DIAGNOSIS — L40.50 PSORIATIC ARTHRITIS: ICD-10-CM

## 2024-03-27 DIAGNOSIS — L40.9 PSORIASIS: ICD-10-CM

## 2024-03-27 PROCEDURE — 99205 OFFICE O/P NEW HI 60 MIN: CPT | Mod: 95,,, | Performed by: INTERNAL MEDICINE

## 2024-03-27 PROCEDURE — 3061F NEG MICROALBUMINURIA REV: CPT | Mod: 95,,, | Performed by: INTERNAL MEDICINE

## 2024-03-27 PROCEDURE — 3066F NEPHROPATHY DOC TX: CPT | Mod: 95,,, | Performed by: INTERNAL MEDICINE

## 2024-03-27 RX ORDER — UPADACITINIB 15 MG/1
15 TABLET, EXTENDED RELEASE ORAL DAILY
Qty: 30 TABLET | Refills: 1 | Status: ACTIVE | OUTPATIENT
Start: 2024-03-27 | End: 2024-05-03

## 2024-03-27 NOTE — PROGRESS NOTES
Isabelle Day MD   [facility]  [address] [facility] [place] [address]  [phone]      PATIENT NAME: Jason Chaudhary  : 1975  DATE: 3/27/24  MRN: [id number]      Billing Provider: Isaeblle Day MD  Level of Service:   Patient PCP Information       Provider PCP Type    Spike Johnson MD General            Reason for Visit / Chief Complaint: No chief complaint on file.     The patient location is: home  The chief complaint leading to consultation is:    Visit type: audio-visual    Face to Face time with patient:    30  minutes of total time spent on the encounter, which includes face to face time and non-face to face time preparing to see the patient (eg, review of tests), Obtaining and/or reviewing separately obtained history, Documenting clinical information in the electronic or other health record, Independently interpreting results (not separately reported) and communicating results to the patient/family/caregiver, or Care coordination (not separately reported).         Each patient to whom he or she provides medical services by telemedicine is:  (1) informed of the relationship between the physician and patient and the respective role of any other health care provider with respect to management of the patient; and (2) notified that he or she may decline to receive medical services by telemedicine and may withdraw from such care at any time.    Notes:         Assessment and Plan (including Health Maintenance)      Problem List  Smart Sets  Document Outside HM   :    Plan:     Chronic psoriasis but newly dx psoriatic spondylitis.   Started bDMARD today.     2/15/2023;   Had a slight response to Humira but skin patch over left lower extremity is still pruritic and not healed. Did not notice a significant response to joint pain with Humira.   Willing to switch biologic routes. Can try Tremfya. Had stopped Humira last month already.     "    07/05/2023:  Started Tremfya. S/p 3 shots [ 2 loading and one maintenance]   The psoriasis on the left lower extremity is improving. Not as itchy anymore.   Would recommend starting PT for low back discomfort. Denies paresthesia or lower extremity sharp shooting pains.   Plan is to continue with Tremfya.   FUP with edmundo mayorga in 4 months . Labs prior to her appt.     Dec 6 2023;   Skin has cleared up a little bit but not much. States joints are ok  Non smoker. 'May need to switch Rinvoq.   FUP with NP in January for Rx on Rinvoq  Will need to get labs every 3 months.   I will defer rinvoq order to NP since its end of 2023 and we will likely need to start a new drug PA early next year. Patient agrees to continue Tremfya.   Since last visit has been dx with A-flutter and is on Eliquis.         01/30/2024:[ NP note] ;  Presents to the clinic today for 3 month follow up. Is still taking Tremfya as prescribed. States that he underwent cardiac ablation in [place], [place] two weeks ago. Currently, no problems post op. BP is elevated today. States that it has been running slightly elevated since procedure. Follows with Dr. Cal Hu at [facility] for management. Psoriasis to left calf area remains the same with no improvement. States that joints are "ok".     3/27/2024: stable from A-fib perspective now s/p 3 months post ablation.  STOP tremfya - has not made much progress with skin and joint.   Ian-inhibitors do have black box warning in terms of thrombotic risk.   Currently on Eliquis so might offset risk of switch to Ian-inhibitor.   Rinvoq sent to [facility].                      History of Present Illness / Problem Focused Workflow     Jason Chaudhary presents to the clinic with No chief complaint on file.     Has had psoriasis for atleast a decade. Currently has a patch on the left side of the leg.   Has used primarily topical agents.   Affirms global stiffness affecting neck , shoulder and lower " back. Takes Aleve once a week. States he has to stretch his ankles every morning before weight bearing.       Review of Systems     Answers for HPI/ROS submitted by the patient on 8/23/2022  fever: No  eye redness: No  mouth sores: No  headaches: No  shortness of breath: No  chest pain: No  trouble swallowing: No  diarrhea: No  constipation: No  unexpected weight change: No  genital sore: No  During the last 3 days, have you had a skin rash?: No  Bruises or bleeds easily: No  cough: No        Medical / Social / Family History     Past Medical History:   Diagnosis Date    Allergy     Hypertension     NEGRITA (obstructive sleep apnea)     Paroxysmal atrial fibrillation     Psoriasis        Past Surgical History:   Procedure Laterality Date    ABLATION AND RECONSTRUCTION, CARDIAC ATRIUM, WITH LIMITED TISSUE ABLATION  01/25/2024    ECHOCARDIOGRAM,TRANSESOPHAGEAL N/A 08/16/2023    Procedure: Transesophageal echo (JUVENAL) intra-procedure log documentation;  Surgeon: Cal Stock MD;  Location: [facility] CATH LAB;  Service: Cardiology;  Laterality: N/A;    RECONSTRUCTION OF CHEST WALL Bilateral     TREATMENT OF CARDIAC ARRHYTHMIA N/A 08/16/2023    Procedure: Cardioversion or Defibrillation;  Surgeon: Cal Stock MD;  Location: [facility] CATH LAB;  Service: Cardiology;  Laterality: N/A;    WRIST SURGERY Left     cyst removal       Social History  Mr. Jason Chaudhary  reports that he has never smoked. He has never been exposed to tobacco smoke. He has never used smokeless tobacco. He reports that he does not drink alcohol and does not use drugs.    Family History  'david Chaudhary family history includes Diabetes in his father; Heart failure in his father; Hypertension in his father and mother.    Medications and Allergies     Medications  No outpatient medications have been marked as taking for the 3/27/24 encounter (Appointment) with Isabelle Day MD.       Allergies  Review of patient's allergies  indicates:   Allergen Reactions    Penicillins Hives    Penicillin g potassium Rash and Nausea And Vomiting       Physical Examination     There were no vitals filed for this visit.    Physical Exam  Constitutional:       Appearance: He is obese.   Musculoskeletal:         General: Tenderness present.      Comments: Scar on left wrist from prior ganglion cyst removal.                 Signature:  Isabelle Day MD  [facility]  [address] [facility] [address]  [phone]    Date of encounter: 3/27/24        Answers submitted by the patient for this visit:  Rheumatology Questionnaire (Submitted on 12/4/2023)  fever: No  eye redness: No  mouth sores: Yes  headaches: No  shortness of breath: No  chest pain: No  trouble swallowing: No  diarrhea: No  constipation: No  unexpected weight change: No  genital sore: No  During the last 3 days, have you had a skin rash?: No  Bruises or bleeds easily: No  cough: Yes    Answers submitted by the patient for this visit:  Rheumatology Questionnaire (Submitted on 3/27/2024)  fever: No  eye redness: No  mouth sores: No  headaches: No  shortness of breath: No  chest pain: No  trouble swallowing: No  diarrhea: No  constipation: No  unexpected weight change: No  genital sore: No  During the last 3 days, have you had a skin rash?: No  Bruises or bleeds easily: No  cough: No

## 2024-04-03 ENCOUNTER — TELEPHONE (OUTPATIENT)
Dept: RHEUMATOLOGY | Facility: CLINIC | Age: 49
End: 2024-04-03
Payer: COMMERCIAL

## 2024-04-03 ENCOUNTER — PATIENT MESSAGE (OUTPATIENT)
Dept: RHEUMATOLOGY | Facility: CLINIC | Age: 49
End: 2024-04-03
Payer: COMMERCIAL

## 2024-04-03 DIAGNOSIS — Z79.899 HIGH RISK MEDICATION USE: Primary | ICD-10-CM

## 2024-04-03 DIAGNOSIS — Z79.899 HIGH RISK MEDICATION USE: ICD-10-CM

## 2024-04-03 PROBLEM — L40.50 PSORIATIC ARTHRITIS: Status: ACTIVE | Noted: 2024-04-03

## 2024-04-03 PROCEDURE — 86704 HEP B CORE ANTIBODY TOTAL: CPT | Mod: 90,,, | Performed by: CLINICAL MEDICAL LABORATORY

## 2024-04-03 PROCEDURE — 36415 COLL VENOUS BLD VENIPUNCTURE: CPT | Mod: ,,, | Performed by: CLINICAL MEDICAL LABORATORY

## 2024-04-03 NOTE — TELEPHONE ENCOUNTER
----- Message from Kelvin Barnes PharmD sent at 4/3/2024 12:41 PM CDT -----  Regarding: RE: Rinvoq Diagnosis  Thank you for confirming! Also, would it be appropriate for him to complete a Hep B core antibody test prior to starting Rinvoq? I only see that the surface antibody and surface antigen were done originally.    ----- Message -----  From: Shelly Tracey FNP  Sent: 4/3/2024   8:11 AM CDT  To: Kelvin Barnes PharmD  Subject: RE: Rinvoq Diagnosis                             Yes! Psoriatic Arthritis is the correct diagnosis. I apologize.     ----- Message -----  From: Kelvin Barnes PharmD  Sent: 4/2/2024   4:50 PM CDT  To: Issac Luke PharmD; YOLA Rdz; #  Subject: Rinvoq Diagnosis                                 Good afternoon,    [facility] received Rinvoq Rx for diagnosis of Psoriatic spondylitis [L40.53]. However, Rinvoq will only be approved for FDA approved diagnosis of Psoriatic Arthritis [L40.50]. Would it be appropriate to use a PsA diagnosis for this patient to assist with PA approval?    Thank you,    Kelvin Barnes, PharmD  Clinical Pharmacist   [facility]   P: [phone]

## 2024-04-09 LAB — HBV CORE AB SERPL QL IA: NEGATIVE

## 2024-04-17 ENCOUNTER — OFFICE VISIT (OUTPATIENT)
Dept: CARDIOLOGY | Facility: CLINIC | Age: 49
End: 2024-04-17
Payer: COMMERCIAL

## 2024-04-17 VITALS
BODY MASS INDEX: 36.45 KG/M2 | SYSTOLIC BLOOD PRESSURE: 160 MMHG | HEART RATE: 78 BPM | DIASTOLIC BLOOD PRESSURE: 90 MMHG | HEIGHT: 78 IN | WEIGHT: 315 LBS | OXYGEN SATURATION: 95 %

## 2024-04-17 DIAGNOSIS — I48.3 TYPICAL ATRIAL FLUTTER: Primary | ICD-10-CM

## 2024-04-17 DIAGNOSIS — I48.91 ATRIAL FIBRILLATION WITH RVR: Chronic | ICD-10-CM

## 2024-04-17 PROCEDURE — 93005 ELECTROCARDIOGRAM TRACING: CPT | Mod: PBBFAC | Performed by: STUDENT IN AN ORGANIZED HEALTH CARE EDUCATION/TRAINING PROGRAM

## 2024-04-17 PROCEDURE — 3061F NEG MICROALBUMINURIA REV: CPT | Mod: ,,, | Performed by: STUDENT IN AN ORGANIZED HEALTH CARE EDUCATION/TRAINING PROGRAM

## 2024-04-17 PROCEDURE — 99214 OFFICE O/P EST MOD 30 MIN: CPT | Mod: S$PBB,,, | Performed by: STUDENT IN AN ORGANIZED HEALTH CARE EDUCATION/TRAINING PROGRAM

## 2024-04-17 PROCEDURE — 3008F BODY MASS INDEX DOCD: CPT | Mod: ,,, | Performed by: STUDENT IN AN ORGANIZED HEALTH CARE EDUCATION/TRAINING PROGRAM

## 2024-04-17 PROCEDURE — 3080F DIAST BP >= 90 MM HG: CPT | Mod: ,,, | Performed by: STUDENT IN AN ORGANIZED HEALTH CARE EDUCATION/TRAINING PROGRAM

## 2024-04-17 PROCEDURE — 99214 OFFICE O/P EST MOD 30 MIN: CPT | Mod: PBBFAC | Performed by: STUDENT IN AN ORGANIZED HEALTH CARE EDUCATION/TRAINING PROGRAM

## 2024-04-17 PROCEDURE — 1159F MED LIST DOCD IN RCRD: CPT | Mod: ,,, | Performed by: STUDENT IN AN ORGANIZED HEALTH CARE EDUCATION/TRAINING PROGRAM

## 2024-04-17 PROCEDURE — 3066F NEPHROPATHY DOC TX: CPT | Mod: ,,, | Performed by: STUDENT IN AN ORGANIZED HEALTH CARE EDUCATION/TRAINING PROGRAM

## 2024-04-17 PROCEDURE — 3077F SYST BP >= 140 MM HG: CPT | Mod: ,,, | Performed by: STUDENT IN AN ORGANIZED HEALTH CARE EDUCATION/TRAINING PROGRAM

## 2024-04-17 PROCEDURE — 93010 ELECTROCARDIOGRAM REPORT: CPT | Mod: S$PBB,,, | Performed by: STUDENT IN AN ORGANIZED HEALTH CARE EDUCATION/TRAINING PROGRAM

## 2024-04-17 RX ORDER — LOSARTAN POTASSIUM 50 MG/1
50 TABLET ORAL DAILY
Qty: 90 TABLET | Refills: 3 | Status: SHIPPED | OUTPATIENT
Start: 2024-04-17 | End: 2024-05-03

## 2024-04-17 RX ORDER — METOPROLOL SUCCINATE 25 MG/1
25 TABLET, EXTENDED RELEASE ORAL DAILY
Qty: 90 TABLET | Refills: 3 | Status: SHIPPED | OUTPATIENT
Start: 2024-04-17 | End: 2025-04-17

## 2024-04-17 NOTE — PROGRESS NOTES
PCP: Spike Johnson MD    Referring Provider:     Subjective:   Jason Chaudhary is a 48 y.o. male with hx of atrial flutter status post ablation who presents for a follow-up visit.    4/17/24: s/p atrial flutter ablation in the interim. Notes he has an  ambulatory cardiac monitor on for another 2 weeks to determine if he   He can come off his Eliquis.Denies any chest pain/tightness, dyspnea, palpitations, lightheadedness or syncope.     10/15/23: Presents for follow-up Doing well.  Denies any chest pain/tightness, dyspnea, lightheadedness or syncope. Has rare palpitations. EP appt coming up.     9/8/23:   Presents for inpatient follow-up.  Complaints of shortness of breath with exertion and orthopnea.  Appears euvolemic on examination.    Occasional palpitations noted. NSR on EKG today.  Compliant with CPAP.    Patient seen in consult on 08/16/2023 at [facility] where he presented for chest tightness, dyspnea and palpitations.  Symptoms originally started 1 week ago and he was seen initial but he was found to have rate controlled atrial flutter and started on Eliquis and cardiology referral made.  He then presented to the hospital with above complaints.  Found to have atrial flutter.  Underwent JUVENAL cardioversion and was successfully cardioverted to normal sinus rhythm.    9/8/23:   Presents for inpatient follow-up.  Complaints of shortness of breath with exertion and orthopnea.  Appears euvolemic on examination.    Occasional palpitations noted. NSR on EKG today.  Compliant with CPAP.    Fhx:  Mother ( hypertension), father (hypertension, diabetes, heart failure)  Shx:  never smoker    EKG  04/17/2024:  Sinus rhythm with first-degree AV block, left posterior fascicular block   09/08/2023:  Normal sinus rhythm with first-degree AV block   08/15/2023: Atrial flutter  with forced 1 av block    ECHO No results found for this or any previous visit.     CATH: Results for orders placed during the hospital encounter of  08/15/23    JUVENAL    Left Ventricle: The left ventricle is normal in size. Normal wall thickness. Normal wall motion. There is normal systolic function with a visually estimated ejection fraction of 55 - 60%.    Left Atrium: The left atrial appendage appears normal. Appendage velocity is normal at greater than 40 cm/sec. There is no thrombus in the left atrial appendage.    Right Ventricle: Normal right ventricular cavity size.    Mitral Valve: There is no mass/vegetation present. There is mild regurgitation.     Lab Results   Component Value Date     01/23/2024    K 3.6 01/23/2024     01/23/2024    CO2 28 01/23/2024    BUN 10 01/23/2024    CREATININE 0.78 01/23/2024    CALCIUM 9.1 01/23/2024    ANIONGAP 10 01/23/2024    EGFRNONAA 100 04/26/2022       Lab Results   Component Value Date    CHOL 129 04/27/2023    CHOL 131 04/26/2022    CHOL 133 09/16/2021     Lab Results   Component Value Date    HDL 30 (L) 04/27/2023    HDL 30 (L) 04/26/2022    HDL 34 (L) 09/16/2021     Lab Results   Component Value Date    LDLCALC 80 04/27/2023    LDLCALC 77 04/26/2022    LDLCALC 77 09/16/2021     Lab Results   Component Value Date    TRIG 95 04/27/2023    TRIG 120 04/26/2022    TRIG 111 09/16/2021     Lab Results   Component Value Date    CHOLHDL 4.3 04/27/2023    CHOLHDL 4.4 04/26/2022    CHOLHDL 3.9 09/16/2021       Lab Results   Component Value Date    WBC 5.06 01/23/2024    HGB 14.8 01/23/2024    HCT 44.0 01/23/2024    MCV 88.7 01/23/2024     01/23/2024           Current Outpatient Medications:     apixaban (ELIQUIS) 5 mg Tab, Take 1 tablet (5 mg total) by mouth 2 (two) times daily., Disp: 90 tablet, Rfl: 3    cefUROXime (CEFTIN) 500 MG tablet, Take 1 tablet (500 mg total) by mouth every 12 (twelve) hours., Disp: 14 tablet, Rfl: 0    fexofenadine (ALLEGRA) 180 MG tablet, Take 180 mg by mouth once daily., Disp: , Rfl:     furosemide (LASIX) 20 MG tablet, Take 1 tablet (20 mg total) by mouth once daily., Disp: 30  tablet, Rfl: 11    metoprolol succinate (TOPROL-XL) 50 MG 24 hr tablet, Take 1 tablet (50 mg total) by mouth once daily., Disp: 90 tablet, Rfl: 3    multivit-min-folic acid-vit K (MULTI FOR HIM, NO IRON,) 400-40 mcg Cap, as directed Orally, Disp: , Rfl:     upadacitinib (RINVOQ) 15 mg 24 hr tablet, Take 1 tablet (15 mg total) by mouth once daily., Disp: 30 tablet, Rfl: 1    Review of Systems   Constitutional:  Negative for chills, diaphoresis, fever and malaise/fatigue.   Respiratory:  Negative for cough and shortness of breath.    Cardiovascular:  Positive for orthopnea. Negative for chest pain, palpitations, claudication, leg swelling and PND.   Gastrointestinal:  Negative for abdominal pain, heartburn, nausea and vomiting.   Neurological:  Negative for dizziness.       Objective:   There were no vitals taken for this visit.    Physical Exam  Constitutional:       General: He is not in acute distress.     Appearance: Normal appearance.   Cardiovascular:      Rate and Rhythm: Normal rate and regular rhythm.      Pulses: Normal pulses.      Heart sounds: Normal heart sounds. No murmur heard.     No friction rub. No gallop.   Pulmonary:      Effort: Pulmonary effort is normal.      Breath sounds: Normal breath sounds. No wheezing or rales.   Musculoskeletal:      Right lower leg: No edema.      Left lower leg: No edema.   Skin:     General: Skin is warm and dry.   Neurological:      Mental Status: He is alert.           Assessment:     1. Atrial fibrillation with RVR                Plan:   No problem-specific Assessment & Plan notes found for this encounter.      Typical atrial flutter   status post ablation  NSR on EKG today   Continue Eliquis 5 mg b.I.d.  until ambulatory cardiac monitor results are available from EP  Decrease metoprolol to 25 mg daily  given first-degree AV block and left posterior fascicular block    Hypertension   BP above goal  Start losartan 25 mg daily    Follow up in 6 months

## 2024-04-18 LAB
OHS QRS DURATION: 102 MS
OHS QTC CALCULATION: 433 MS

## 2024-05-02 ENCOUNTER — PATIENT OUTREACH (OUTPATIENT)
Dept: ADMINISTRATIVE | Facility: HOSPITAL | Age: 49
End: 2024-05-02

## 2024-05-02 NOTE — PROGRESS NOTES
Population Health Chart Review & Patient Outreach Details  Per [facility] website, insurance is active and pt is listed on the attributed list needing a healthy you performed in   HY scheduled for 5/3/2024    Further Action Needed If Patient Returns Outreach:            Updates Requested / Reviewed:     []  Care Everywhere    []     []  External Sources ([facility], [facility], [facility], etc.)    [] [facility]   [] [facility]   [] Other:    []  Care Team Updated   []  Removed  or Duplicate Orders   []  Immunization Reconciliation Completed / Queried    [] [place]   [] [place]   [] [place]   [] [place]      Health Maintenance Topics Addressed and Outreach Outcomes / Actions Taken:             Breast Cancer Screening []  Mammogram Order Placed    []  Mammogram Screening Scheduled    []  External Records Requested & Care Team Updated if Applicable    []  External Records Uploaded & Care Team Updated if Applicable    []  Pt Declined Scheduling Mammogram    []  Pt Will Schedule with External Provider / Order Routed & Care Team Updated if Applicable              Cervical Cancer Screening []  Pap Smear Scheduled in Primary Care or OBGYN    []  External Records Requested & Care Team Updated if Applicable       []  External Records Uploaded, Care Team Updated, & History Updated if Applicable    []  Patient Declined Scheduling Pap Smear    []  Patient Will Schedule with External Provider & Care Team Updated if Applicable                  Colorectal Cancer Screening []  Colonoscopy Case Request / Referral / Home Test Order Placed    []  External Records Requested & Care Team Updated if Applicable    []  External Records Uploaded, Care Team Updated, & History Updated if Applicable    []  Patient Declined Completing Colon Cancer Screening    []  Patient Will Schedule with External Provider & Care Team Updated if Applicable    []  Fit Kit Mailed (add the SmartPhrase under additional notes)    []  Reminded Patient to Complete  Home Test                Diabetic Eye Exam []  Eye Exam Screening Order Placed    []  Eye Camera Scheduled or Optometry/Ophthalmology Referral Placed    []  External Records Requested & Care Team Updated if Applicable    []  External Records Uploaded, Care Team Updated, & History Updated if Applicable    []  Patient Declined Scheduling Eye Exam    []  Patient Will Schedule with External Provider & Care Team Updated if Applicable             Blood Pressure Control []  Primary Care Follow Up Visit Scheduled     []  Remote Blood Pressure Reading Captured    []  Patient Declined Remote Reading or Scheduling Appt - Escalated to PCP    []  Patient Will Call Back or Send Portal Message with Reading                 HbA1c & Other Labs []  Overdue Lab(s) Ordered    []  Overdue Lab(s) Scheduled    []  External Records Uploaded & Care Team Updated if Applicable    []  Primary Care Follow Up Visit Scheduled     []  Reminded Patient to Complete A1c Home Test    []  Patient Declined Scheduling Labs or Will Call Back to Schedule    []  Patient Will Schedule with External Provider / Order Routed, & Care Team Updated if Applicable           Primary Care Appointment []  Primary Care Appt Scheduled    []  Patient Declined Scheduling or Will Call Back to Schedule    []  Pt Established with External Provider, Updated Care Team, & Informed Pt to Notify Payor if Applicable           Medication Adherence /    Statin Use []  Primary Care Appointment Scheduled    []  Patient Reminded to  Prescription    []  Patient Declined, Provider Notified if Needed    []  Sent Provider Message to Review to Evaluate Pt for Statin, Add Exclusion Dx Codes, Document   Exclusion in Problem List, Change Statin Intensity Level to Moderate or High Intensity if Applicable                Osteoporosis Screening []  Dexa Order Placed    []  Dexa Appointment Scheduled    []  External Records Requested & Care Team Updated    []  External Records Uploaded, Care  Team Updated, & History Updated if Applicable    []  Patient Declined Scheduling Dexa or Will Call Back to Schedule    []  Patient Will Schedule with External Provider / Order Routed & Care Team Updated if Applicable       Additional Notes:

## 2024-05-03 ENCOUNTER — OFFICE VISIT (OUTPATIENT)
Dept: FAMILY MEDICINE | Facility: CLINIC | Age: 49
End: 2024-05-03
Payer: COMMERCIAL

## 2024-05-03 VITALS
DIASTOLIC BLOOD PRESSURE: 80 MMHG | WEIGHT: 315 LBS | OXYGEN SATURATION: 96 % | BODY MASS INDEX: 36.45 KG/M2 | TEMPERATURE: 98 F | HEIGHT: 78 IN | HEART RATE: 69 BPM | SYSTOLIC BLOOD PRESSURE: 148 MMHG

## 2024-05-03 DIAGNOSIS — Z00.01 ENCOUNTER FOR ANNUAL GENERAL MEDICAL EXAMINATION WITH ABNORMAL FINDINGS IN ADULT: Primary | ICD-10-CM

## 2024-05-03 DIAGNOSIS — Z13.1 SCREENING FOR DIABETES MELLITUS: ICD-10-CM

## 2024-05-03 DIAGNOSIS — Z13.220 SCREENING FOR LIPOID DISORDERS: ICD-10-CM

## 2024-05-03 DIAGNOSIS — Z00.00 ROUTINE GENERAL MEDICAL EXAMINATION AT A HEALTH CARE FACILITY: ICD-10-CM

## 2024-05-03 DIAGNOSIS — I10 ESSENTIAL HYPERTENSION: Chronic | ICD-10-CM

## 2024-05-03 LAB
CHOLEST SERPL-MCNC: 143 MG/DL (ref 0–200)
CHOLEST/HDLC SERPL: 4.8 {RATIO}
GLUCOSE SERPL-MCNC: 98 MG/DL (ref 74–106)
HDLC SERPL-MCNC: 30 MG/DL (ref 40–60)
LDLC SERPL CALC-MCNC: 88 MG/DL
LDLC/HDLC SERPL: 2.9 {RATIO}
NONHDLC SERPL-MCNC: 113 MG/DL
TRIGL SERPL-MCNC: 124 MG/DL (ref 35–150)
VLDLC SERPL-MCNC: 25 MG/DL

## 2024-05-03 PROCEDURE — 1159F MED LIST DOCD IN RCRD: CPT | Mod: ,,, | Performed by: FAMILY MEDICINE

## 2024-05-03 PROCEDURE — 4010F ACE/ARB THERAPY RXD/TAKEN: CPT | Mod: ,,, | Performed by: FAMILY MEDICINE

## 2024-05-03 PROCEDURE — 3008F BODY MASS INDEX DOCD: CPT | Mod: ,,, | Performed by: FAMILY MEDICINE

## 2024-05-03 PROCEDURE — 1160F RVW MEDS BY RX/DR IN RCRD: CPT | Mod: ,,, | Performed by: FAMILY MEDICINE

## 2024-05-03 PROCEDURE — 3077F SYST BP >= 140 MM HG: CPT | Mod: ,,, | Performed by: FAMILY MEDICINE

## 2024-05-03 PROCEDURE — 3066F NEPHROPATHY DOC TX: CPT | Mod: ,,, | Performed by: FAMILY MEDICINE

## 2024-05-03 PROCEDURE — 82947 ASSAY GLUCOSE BLOOD QUANT: CPT | Mod: ,,, | Performed by: CLINICAL MEDICAL LABORATORY

## 2024-05-03 PROCEDURE — 99396 PREV VISIT EST AGE 40-64: CPT | Mod: ,,, | Performed by: FAMILY MEDICINE

## 2024-05-03 PROCEDURE — 3079F DIAST BP 80-89 MM HG: CPT | Mod: ,,, | Performed by: FAMILY MEDICINE

## 2024-05-03 PROCEDURE — 80061 LIPID PANEL: CPT | Mod: ,,, | Performed by: CLINICAL MEDICAL LABORATORY

## 2024-05-03 PROCEDURE — 3061F NEG MICROALBUMINURIA REV: CPT | Mod: ,,, | Performed by: FAMILY MEDICINE

## 2024-05-03 NOTE — PROGRESS NOTES
Subjective     Patient ID: Jason Chaudhary is a 48 y.o. male.    Chief Complaint: Healthy You (Pt is here for healthy you visit. )    HPI  Review of Systems   Constitutional:  Negative for activity change, appetite change, chills, fatigue and fever.   HENT:  Negative for nasal congestion, ear pain, hearing loss, postnasal drip and sore throat.    Respiratory:  Negative for cough, chest tightness, shortness of breath and wheezing.    Cardiovascular:  Negative for chest pain, palpitations, leg swelling and claudication.   Gastrointestinal:  Negative for abdominal pain, change in bowel habit, constipation, diarrhea, nausea and vomiting.   Genitourinary:  Negative for dysuria.   Musculoskeletal:  Negative for arthralgias, back pain, gait problem and myalgias.   Integumentary:  Negative for rash.   Neurological:  Negative for weakness and headaches.   Psychiatric/Behavioral:  Negative for suicidal ideas. The patient is not nervous/anxious.        Tobacco Use: Low Risk  (5/3/2024)    Patient History     Smoking Tobacco Use: Never     Smokeless Tobacco Use: Never     Passive Exposure: Never     Review of patient's allergies indicates:   Allergen Reactions    Penicillins Hives    Penicillin g potassium Rash and Nausea And Vomiting     Current Outpatient Medications   Medication Instructions    apixaban (ELIQUIS) 5 mg, Oral, 2 times daily    fexofenadine (ALLEGRA) 180 mg, Oral, Daily    furosemide (LASIX) 20 mg, Oral, Daily    metoprolol succinate (TOPROL-XL) 25 mg, Oral, Daily    multivit-min-folic acid-vit K (MULTI FOR HIM, NO IRON,) 400-40 mcg Cap as directed Orally    multivitamin (MULTIPLE VITAMINS DAILY ORAL) 1 tablet, Oral, Daily     Medications Discontinued During This Encounter   Medication Reason    upadacitinib (RINVOQ) 15 mg 24 hr tablet     cefUROXime (CEFTIN) 500 MG tablet     losartan (COZAAR) 50 MG tablet        Past Medical History:   Diagnosis Date    Allergy     Hypertension     NEGRITA (obstructive  "sleep apnea)     Paroxysmal atrial fibrillation     Psoriasis      Health Maintenance Topics with due status: Not Due       Topic Last Completion Date    Lipid Panel 04/27/2023    Hemoglobin A1c (Diabetic Prevention Screening) 08/16/2023    Colorectal Cancer Screening 08/23/2023     Immunization History   Administered Date(s) Administered    COVID-19, MRNA, LN-S, PF (Pfizer) (Purple Cap) 03/18/2021, 04/08/2021, 12/13/2021    COVID-19, mRNA, LNP-S, bivalent booster, PF (Moderna Omicron)12 + YEARS 11/30/2022    Influenza - Quadrivalent - PF *Preferred* (6 months and older) 11/24/2021, 11/01/2022       Objective     Body mass index is 39.52 kg/m².  Wt Readings from Last 3 Encounters:   05/03/24 (!) 155.1 kg (342 lb)   04/17/24 (!) 155.1 kg (342 lb)   03/14/24 (!) 155.1 kg (342 lb)     Ht Readings from Last 3 Encounters:   05/03/24 6' 6" (1.981 m)   04/17/24 6' 6" (1.981 m)   03/14/24 6' 6" (1.981 m)     BP Readings from Last 3 Encounters:   05/03/24 (!) 148/80   04/17/24 (!) 160/90   03/14/24 (!) 158/89     Temp Readings from Last 3 Encounters:   05/03/24 98.1 °F (36.7 °C) (Oral)   03/14/24 99.2 °F (37.3 °C) (Oral)   02/27/24 98 °F (36.7 °C) (Oral)     Pulse Readings from Last 3 Encounters:   05/03/24 69   04/17/24 78   03/14/24 71     Resp Readings from Last 3 Encounters:   03/14/24 20   01/30/24 14   11/28/23 16     PF Readings from Last 3 Encounters:   No data found for PF       Physical Exam  Vitals and nursing note reviewed.   Constitutional:       General: He is not in acute distress.     Appearance: Normal appearance. He is not ill-appearing.   Eyes:      Extraocular Movements: Extraocular movements intact.      Pupils: Pupils are equal, round, and reactive to light.   Cardiovascular:      Rate and Rhythm: Normal rate and regular rhythm.      Heart sounds: Normal heart sounds.   Pulmonary:      Effort: Pulmonary effort is normal.      Breath sounds: Normal breath sounds.   Abdominal:      General: Bowel sounds " are normal.      Palpations: Abdomen is soft.   Musculoskeletal:         General: Normal range of motion.   Skin:     Findings: No rash.   Neurological:      General: No focal deficit present.      Mental Status: He is alert and oriented to person, place, and time. Mental status is at baseline.   Psychiatric:         Mood and Affect: Mood normal.         Behavior: Behavior normal.         Assessment and Plan     Problem List Items Addressed This Visit          Cardiac/Vascular    Essential hypertension (Chronic)     Other Visit Diagnoses       Encounter for annual general medical examination with abnormal findings in adult    -  Primary    Routine general medical examination at a health care facility        Screening for diabetes mellitus        Relevant Orders    Glucose, Random    Screening for lipoid disorders        Relevant Orders    Lipid Panel              Plan:       I have reviewed the medications, allergies, and problem list.     Goal Actions:    What type of visit is the patient here for today?: Healthy You  Does the patient consent to enroll in [facility] Healthy?: Yes  Is this a Wellness Follow Up?: No  What is your overall wellness goal? (select at least one): Improve overall health  Choose 3: Lifestyle, Exercise, Nutrition  Lifestyle Actions : Schedule colonoscopy, sigmoidoscopy, or Colorguard if applicable, Take medications as prescribed, Develop good support system  Nurtrition Actions: Read nutrition labels, Eat a well-balanced diet, drink 8-10 glasses of water per day  Exercise Actions: Recommend physical activity 30 minutes per day 3-5 times/week

## 2024-05-03 NOTE — PATIENT INSTRUCTIONS
"Patient Education       High Blood Pressure in Adults   The Basics   Written by the doctors and editors at [facility]   What is high blood pressure? -- High blood pressure is a condition that puts you at risk for heart attack, stroke, and kidney disease. It does not usually cause symptoms. But it can be serious.  When your doctor or nurse tells you your blood pressure, they will say 2 numbers. For instance, your doctor or nurse might say that your blood pressure is "130 over 80." The top number is the pressure inside your arteries when your heart is ale. The bottom number is the pressure inside your arteries when your heart is relaxed.  "Elevated blood pressure" is a term doctors or nurses use as a warning. People with elevated blood pressure do not yet have high blood pressure. But their blood pressure is not as low as it should be for good health.  Many experts define high, elevated, and normal blood pressure as follows:  High - Top number of 130 or above and/or bottom number of 80 or above  Elevated - Top number between 120 and 129 and bottom number of 79 or below  Normal - Top number of 119 or below and bottom number of 79 or below  This information is also in the table (table 1).   How can I lower my blood pressure? -- If your doctor or nurse has prescribed blood pressure medicine, the most important thing you can do is to take it. If it causes side effects, do not just stop taking it. Instead, talk to your doctor or nurse about the problems it causes. They might be able to lower your dose or switch you to another medicine. If cost is a problem, mention that too. They might be able to put you on a less expensive medicine. Taking your blood pressure medicine can keep you from having a heart attack or stroke, and it can save your life!  Can I do anything on my own? -- You have a lot of control over your blood pressure. To lower it:  Lose weight (if you are overweight)  Choose a diet low in fat and rich in " "fruits, vegetables, and low-fat dairy products  Reduce the amount of salt you eat  Do something active for at least 30 minutes a day on most days of the week  Cut down on alcohol (if you drink more than 2 alcoholic drinks per day)  It's also a good idea to get a home blood pressure meter. People who check their own blood pressure at home do better at keeping it low and can sometimes even reduce the amount of medicine they take.  All topics are updated as new evidence becomes available and our peer review process is complete.  This topic retrieved from [employer] on: Sep 21, 2021.  Topic 93308 Version 15.0  Release: 29.4.2 - C29.263  © 2021 UpToDate, Inc. and/or its affiliates. All rights reserved.  table 1: Definition of normal and high blood pressure  Level  Top number  Bottom number    High 130 or above 80 or above   Elevated 120 to 129 79 or below   Normal 119 or below 79 or below   These definitions are from the American College of Cardiology/American Heart Association. Other expert groups might use slightly different definitions.  "Elevated blood pressure" is a term doctor or nurses use as a warning. It means you do not yet have high blood pressure, but your blood pressure is not as low as it should be for good health.  Graphic 75413 Version 6.0  Consumer Information Use and Disclaimer   This information is not specific medical advice and does not replace information you receive from your health care provider. This is only a brief summary of general information. It does NOT include all information about conditions, illnesses, injuries, tests, procedures, treatments, therapies, discharge instructions or life-style choices that may apply to you. You must talk with your health care provider for complete information about your health and treatment options. This information should not be used to decide whether or not to accept your health care provider's advice, instructions or recommendations. Only your health care " provider has the knowledge and training to provide advice that is right for you. The use of this information is governed by the [employer] End User License Agreement, available at https://www.MedSynergies.dentaZOOM/en/solutions/[employer]/about/asim.The use of FANCRU content is governed by the FANCRU Terms of Use. ©2021 UpToDate, Inc. All rights reserved.  Copyright   © 2021 UpToDate, Inc. and/or its affiliates. All rights reserved.    Patient Education       Diet and Health   The Basics   Written by the doctors and editors at [facility]   Why is it important to eat a healthy diet? -- It's important to eat a healthy diet because eating the right foods can keep you healthy now and later on in life.  Which foods are especially healthy? -- Foods that are especially healthy include:  Fruits and vegetables - Eating a diet with lots of fruits and vegetables can help prevent heart disease and strokes. It might also help prevent certain types of cancers. Try to eat fruits and vegetables at each meal and also for snacks. If you don't have fresh fruits and vegetables available, you can eat frozen or canned ones instead. Doctors recommend eating at least 2 1/2 servings of vegetables and 2 servings of fruits each day.  Foods with fiber - Eating foods with a lot of fiber can help prevent heart disease and strokes. If you have type 2 diabetes, it can also help control your blood sugar. Foods that have a lot of fiber include vegetables, fruits, beans, nuts, oatmeal, and whole grain breads and cereals. You can tell how much fiber is in a food by reading the nutrition label (figure 1). Doctors recommend eating 25 to 36 grams of fiber each day.  Foods with folate (also called folic acid) - Folate is a vitamin that is important for pregnant people, since it helps prevent certain birth defects. Anyone who could get pregnant should get at least 400 micrograms of folic acid daily, whether or not they are actively trying to get pregnant. Folate  "is found in many breakfast cereals, oranges, orange juice, and green leafy vegetables.  Foods with calcium and vitamin D - Babies, children, and adults need calcium and vitamin D to help keep their bones strong. Adults also need calcium and vitamin D to help prevent osteoporosis. Osteoporosis is a condition that causes bones to get "thin" and break more easily than usual. Different foods and drinks have calcium and vitamin D in them (figure 2). People who don't get enough calcium and vitamin D in their diet might need to take a supplement.  Foods with protein - Protein helps your muscles stay strong. Healthy foods with a lot of protein include chicken, fish, eggs, beans, nuts, and soy products. Red meat also has a lot of protein, but it also contains fats, which can be unhealthy.  Some experts recommend a "Mediterranean diet." This involves eating a lot of fruits, vegetables, nuts, whole grains, and olive oil. It also includes some fish, poultry, and dairy products, but not a lot of red meat. Eating this way can help your overall health, and might even lower your risk of having a stroke.  What foods should I avoid or limit? -- To eat a healthy diet, there are some things you should avoid or limit. They include:   Fats - There are different types of fats. Some types of fats are better for your body than others.  Trans fats are especially unhealthy. They are found in margarines, many fast foods, and some store-bought baked goods. Trans fats can raise your cholesterol level and your increase your chance of getting heart disease. You should avoid eating foods with these types of fats.  The type of "polyunsaturated" fats found in fish seems to be healthy and can reduce your chance of getting heart disease. Other polyunsaturated fats might also be good for your health. When you cook, it's best to use oils with healthier fats, such as olive oil and canola oil.  Sugar - To have a healthy diet, it's important to limit or " "avoid sugar, sweets, and refined grains. Refined grains are found in white bread, white rice, most forms of pasta, and most packaged "snack" foods. Whole grains, such as whole-wheat bread and brown rice, have more fiber and are better for your health.  Avoiding sugar-sweetened beverages, like soda and sports drinks, can also help improve your health.  Red meat - Studies have shown that eating a lot of red meat can increase your risk of certain health problems, including heart disease and cancer. You should limit the amount of red meat that you eat.  Can I drink alcohol as part of a healthy diet? -- People who drink a small amount of alcohol each day might have a lower chance of getting heart disease. But drinking alcohol can lead to problems. For example, it can raise a person's chances of getting liver disease and certain types of cancers. In women, even 1 drink a day can increase the risk of getting breast cancer.  Most doctors recommend that adult women not have more than 1 drink a day and that adult men not have more than 2 drinks a day. The limits are different because most women's bodies take longer to break down alcohol.  How many calories do I need each day? -- The number of calories you need each day depends on your weight, height, age, sex, and how active you are.  Your doctor or nurse can tell you how many calories you should eat each day. If you are trying to lose weight, you should eat fewer calories each day.  What if I have questions? -- If you have questions about which foods you should or should not eat, ask your doctor or nurse. The right diet for you will depend, in part, on your health and any medical conditions you have.  All topics are updated as new evidence becomes available and our peer review process is complete.  This topic retrieved from [employer] on: Sep 21, 2021.  Topic 44960 Version 20.0  Release: 29.4.2 - C29.263  © 2021 UpToDate, Inc. and/or its affiliates. All rights " "reserved.  figure 1: Nutrition label - fiber     This is an example of a nutrition label. To figure out how much fiber is in a food, look for the line that says "Dietary Fiber." It's also important to look at the serving size. This food has 7 grams of fiber in each serving, and each serving is 1 cup.  Graphic 61625 Version 7.0    figure 2: Foods and drinks with calcium and vitamin D     Foods rich in calcium include ice cream, soy milk, breads, kale, broccoli, milk, cheese, cottage cheese, almonds, yogurt, ready-to-eat cereals, beans, and tofu. Foods rich in vitamin D include milk, fortified plant-based "milks" (soy, almond), canned tuna fish, cod liver oil, yogurt, ready-to-eat-cereals, cooked salmon, canned sardines, mackerel, and eggs. Some of these foods are rich in both.  Graphic 64912 Version 4.0    Consumer Information Use and Disclaimer   This information is not specific medical advice and does not replace information you receive from your health care provider. This is only a brief summary of general information. It does NOT include all information about conditions, illnesses, injuries, tests, procedures, treatments, therapies, discharge instructions or life-style choices that may apply to you. You must talk with your health care provider for complete information about your health and treatment options. This information should not be used to decide whether or not to accept your health care provider's advice, instructions or recommendations. Only your health care provider has the knowledge and training to provide advice that is right for you. The use of this information is governed by the [employer] End User License Agreement, available at https://www.ShopPad.Auctelia/en/solutions/[employer]/about/asim.The use of [employer]kicker content is governed by the [employer]kicker Terms of Use. ©2021 UpToDate, Inc. All rights reserved.  Copyright   © 2021 UpToDate, Inc. and/or its affiliates. All rights reserved.    "

## 2024-05-06 ENCOUNTER — PATIENT OUTREACH (OUTPATIENT)
Dept: ADMINISTRATIVE | Facility: HOSPITAL | Age: 49
End: 2024-05-06

## 2024-05-06 NOTE — PROGRESS NOTES
Population Health Chart Review & Patient Outreach Details      Further Action Needed If Patient Returns Outreach:            Updates Requested / Reviewed:     []  Care Everywhere    []     []  External Sources ([facility], [facility], DIS, etc.)    [] [facility]   [] [facility]   [] Other:    []  Care Team Updated   []  Removed  or Duplicate Orders   []  Immunization Reconciliation Completed / Queried    [] [place]   [] [place]   [] [place]   [] [place]      Health Maintenance Topics Addressed and Outreach Outcomes / Actions Taken:             Breast Cancer Screening []  Mammogram Order Placed    []  Mammogram Screening Scheduled    []  External Records Requested & Care Team Updated if Applicable    []  External Records Uploaded & Care Team Updated if Applicable    []  Pt Declined Scheduling Mammogram    []  Pt Will Schedule with External Provider / Order Routed & Care Team Updated if Applicable              Cervical Cancer Screening []  Pap Smear Scheduled in Primary Care or OBGYN    []  External Records Requested & Care Team Updated if Applicable       []  External Records Uploaded, Care Team Updated, & History Updated if Applicable    []  Patient Declined Scheduling Pap Smear    []  Patient Will Schedule with External Provider & Care Team Updated if Applicable                  Colorectal Cancer Screening []  Colonoscopy Case Request / Referral / Home Test Order Placed    []  External Records Requested & Care Team Updated if Applicable    []  External Records Uploaded, Care Team Updated, & History Updated if Applicable    []  Patient Declined Completing Colon Cancer Screening    []  Patient Will Schedule with External Provider & Care Team Updated if Applicable    []  Fit Kit Mailed (add the SmartPhrase under additional notes)    []  Reminded Patient to Complete Home Test                Diabetic Eye Exam []  Eye Exam Screening Order Placed    []  Eye Camera Scheduled or Optometry/Ophthalmology Referral  Placed    []  External Records Requested & Care Team Updated if Applicable    []  External Records Uploaded, Care Team Updated, & History Updated if Applicable    []  Patient Declined Scheduling Eye Exam    []  Patient Will Schedule with External Provider & Care Team Updated if Applicable             Blood Pressure Control []  Primary Care Follow Up Visit Scheduled     []  Remote Blood Pressure Reading Captured    []  Patient Declined Remote Reading or Scheduling Appt - Escalated to PCP    []  Patient Will Call Back or Send Portal Message with Reading                 HbA1c & Other Labs []  Overdue Lab(s) Ordered    []  Overdue Lab(s) Scheduled    []  External Records Uploaded & Care Team Updated if Applicable    []  Primary Care Follow Up Visit Scheduled     []  Reminded Patient to Complete A1c Home Test    []  Patient Declined Scheduling Labs or Will Call Back to Schedule    []  Patient Will Schedule with External Provider / Order Routed, & Care Team Updated if Applicable           Primary Care Appointment []  Primary Care Appt Scheduled    []  Patient Declined Scheduling or Will Call Back to Schedule    []  Pt Established with External Provider, Updated Care Team, & Informed Pt to Notify Payor if Applicable           Medication Adherence /    Statin Use []  Primary Care Appointment Scheduled    []  Patient Reminded to  Prescription    []  Patient Declined, Provider Notified if Needed    []  Sent Provider Message to Review to Evaluate Pt for Statin, Add Exclusion Dx Codes, Document   Exclusion in Problem List, Change Statin Intensity Level to Moderate or High Intensity if Applicable                Osteoporosis Screening []  Dexa Order Placed    []  Dexa Appointment Scheduled    []  External Records Requested & Care Team Updated    []  External Records Uploaded, Care Team Updated, & History Updated if Applicable    []  Patient Declined Scheduling Dexa or Will Call Back to Schedule    []  Patient Will Schedule  with External Provider / Order Routed & Care Team Updated if Applicable       Additional Notes:.  Post visit Population Health review of encounter with date of service  5/3/24 with Johnson.  All required HY components in encounter.    Followup appt for: 5/6/25HY

## 2024-06-13 ENCOUNTER — OFFICE VISIT (OUTPATIENT)
Dept: FAMILY MEDICINE | Facility: CLINIC | Age: 49
End: 2024-06-13
Payer: COMMERCIAL

## 2024-06-13 VITALS
HEART RATE: 72 BPM | RESPIRATION RATE: 18 BRPM | HEIGHT: 78 IN | DIASTOLIC BLOOD PRESSURE: 87 MMHG | BODY MASS INDEX: 36.45 KG/M2 | TEMPERATURE: 99 F | WEIGHT: 315 LBS | SYSTOLIC BLOOD PRESSURE: 150 MMHG | OXYGEN SATURATION: 96 %

## 2024-06-13 DIAGNOSIS — G47.33 OSA (OBSTRUCTIVE SLEEP APNEA): Chronic | ICD-10-CM

## 2024-06-13 DIAGNOSIS — L40.9 PSORIASIS: Primary | Chronic | ICD-10-CM

## 2024-06-13 PROBLEM — L40.50 PSORIATIC ARTHRITIS: Chronic | Status: ACTIVE | Noted: 2024-04-03

## 2024-06-13 PROCEDURE — 3079F DIAST BP 80-89 MM HG: CPT | Mod: ,,, | Performed by: FAMILY MEDICINE

## 2024-06-13 PROCEDURE — 3008F BODY MASS INDEX DOCD: CPT | Mod: ,,, | Performed by: FAMILY MEDICINE

## 2024-06-13 PROCEDURE — 1159F MED LIST DOCD IN RCRD: CPT | Mod: ,,, | Performed by: FAMILY MEDICINE

## 2024-06-13 PROCEDURE — 99213 OFFICE O/P EST LOW 20 MIN: CPT | Mod: ,,, | Performed by: FAMILY MEDICINE

## 2024-06-13 PROCEDURE — 3061F NEG MICROALBUMINURIA REV: CPT | Mod: ,,, | Performed by: FAMILY MEDICINE

## 2024-06-13 PROCEDURE — 1160F RVW MEDS BY RX/DR IN RCRD: CPT | Mod: ,,, | Performed by: FAMILY MEDICINE

## 2024-06-13 PROCEDURE — 3066F NEPHROPATHY DOC TX: CPT | Mod: ,,, | Performed by: FAMILY MEDICINE

## 2024-06-13 PROCEDURE — 4010F ACE/ARB THERAPY RXD/TAKEN: CPT | Mod: ,,, | Performed by: FAMILY MEDICINE

## 2024-06-13 PROCEDURE — 3077F SYST BP >= 140 MM HG: CPT | Mod: ,,, | Performed by: FAMILY MEDICINE

## 2024-06-13 RX ORDER — PREDNISONE 10 MG/1
TABLET ORAL
Qty: 53 TABLET | Refills: 0 | Status: SHIPPED | OUTPATIENT
Start: 2024-06-13 | End: 2024-07-04

## 2024-06-13 RX ORDER — BETAMETHASONE VALERATE 1 MG/G
CREAM TOPICAL
Qty: 45 G | Refills: 1 | Status: SHIPPED | OUTPATIENT
Start: 2024-06-13

## 2024-06-13 NOTE — PROGRESS NOTES
Spike Johnson MD    [address] [place] [address]     PATIENT NAME: Jason Chaudhary  : 1975  DATE: 24  MRN: [id number]      Billing Provider: Spike Johnson MD  Level of Service: SD OFFICE/OUTPT VISIT, EST, LEVL III, 20-29 MIN  Patient PCP Information       Provider PCP Type    Spike Johnson MD General            Reason for Visit / Chief Complaint: Skin lesion (Patient has large skin lesion to the back of his right upper thigh.  It has redness around the area.  He does report irritation and itching.) and Did not bring medications (Verbally reviewed.)       Update PCP  Update Chief Complaint         History of Present Illness / Problem Focused Workflow     Jason Chaudhary presents to the clinic with Skin lesion (Patient has large skin lesion to the back of his right upper thigh.  It has redness around the area.  He does report irritation and itching.) and Did not bring medications (Verbally reviewed.)     HPI    Review of Systems     Review of Systems   Constitutional:  Negative for activity change, appetite change, chills, fatigue and fever.   HENT:  Negative for nasal congestion, ear pain, hearing loss, postnasal drip and sore throat.    Respiratory:  Negative for cough, chest tightness, shortness of breath and wheezing.    Cardiovascular:  Negative for chest pain, palpitations, leg swelling and claudication.   Gastrointestinal:  Negative for abdominal pain, change in bowel habit, constipation, diarrhea, nausea and vomiting.   Genitourinary:  Negative for dysuria.   Musculoskeletal:  Negative for arthralgias, back pain, gait problem and myalgias.   Integumentary:  Positive for rash.   Neurological:  Negative for weakness and headaches.   Psychiatric/Behavioral:  Negative for suicidal ideas. The patient is not nervous/anxious.         Medical / Social / Family History     Past Medical History:   Diagnosis Date    Allergy      Hypertension     NEGRITA (obstructive sleep apnea)     Paroxysmal atrial fibrillation     Psoriasis        Past Surgical History:   Procedure Laterality Date    ABLATION AND RECONSTRUCTION, CARDIAC ATRIUM, WITH LIMITED TISSUE ABLATION  01/25/2024    ECHOCARDIOGRAM,TRANSESOPHAGEAL N/A 08/16/2023    Procedure: Transesophageal echo (JUVENAL) intra-procedure log documentation;  Surgeon: Cal Stock MD;  Location: [facility] CATH LAB;  Service: Cardiology;  Laterality: N/A;    RECONSTRUCTION OF CHEST WALL Bilateral     TREATMENT OF CARDIAC ARRHYTHMIA N/A 08/16/2023    Procedure: Cardioversion or Defibrillation;  Surgeon: Cal Stock MD;  Location: [facility] CATH LAB;  Service: Cardiology;  Laterality: N/A;    WRIST SURGERY Left     cyst removal       Social History    reports that he has never smoked. He has never been exposed to tobacco smoke. He has never used smokeless tobacco. He reports that he does not drink alcohol and does not use drugs.    Family History  's family history includes Diabetes in his father; Heart failure in his father; Hypertension in his father and mother.    Medications and Allergies     Medications  Outpatient Medications Marked as Taking for the 6/13/24 encounter (Office Visit) with Spike Johnson MD   Medication Sig Dispense Refill    apixaban (ELIQUIS) 5 mg Tab Take 1 tablet (5 mg total) by mouth 2 (two) times daily. 28 tablet 3    fexofenadine (ALLEGRA) 180 MG tablet Take 180 mg by mouth once daily.      furosemide (LASIX) 20 MG tablet Take 1 tablet (20 mg total) by mouth once daily. 30 tablet 11    metoprolol succinate (TOPROL-XL) 25 MG 24 hr tablet Take 1 tablet (25 mg total) by mouth once daily. 90 tablet 3    multivit-min-folic acid-vit K (MULTI FOR HIM, NO IRON,) 400-40 mcg Cap as directed Orally      multivitamin (MULTIPLE VITAMINS DAILY ORAL) Take 1 tablet by mouth once daily.         Allergies  Review of patient's allergies indicates:   Allergen Reactions    Penicillins  Hives    Penicillin g potassium Rash and Nausea And Vomiting       Physical Examination     Vitals:    06/13/24 0953   BP: (!) 150/87   Pulse: 72   Resp: 18   Temp: 98.6 °F (37 °C)     Physical Exam  Vitals and nursing note reviewed.   Constitutional:       General: He is not in acute distress.     Appearance: Normal appearance. He is not ill-appearing.   Eyes:      Extraocular Movements: Extraocular movements intact.      Pupils: Pupils are equal, round, and reactive to light.   Cardiovascular:      Rate and Rhythm: Normal rate and regular rhythm.   Pulmonary:      Effort: Pulmonary effort is normal.      Breath sounds: Normal breath sounds.   Skin:     General: Skin is warm.      Findings: Lesion and rash present. Rash is crusting.             Comments: Large, circular lesion, raised with a skin tag in the middle of it, see image below    Neurological:      General: No focal deficit present.      Mental Status: He is alert and oriented to person, place, and time. Mental status is at baseline.   Psychiatric:         Mood and Affect: Mood normal.         Behavior: Behavior normal.            Assessment and Plan (including Health Maintenance)      Problem List  Smart Stormpulse  Document Outside HM   :    Plan:     Appears to have psoriasis skin lesion. Just so happens to have a skin tag in the middle of it. Steroid taper and steroid cream. He is currently waiting for his next Rheum appointment which is in October     Health Maintenance Due   Topic Date Due    Pneumococcal Vaccines (Age 0-64) (1 of 2 - PCV) Never done    TETANUS VACCINE  Never done    COVID-19 Vaccine (5 - 2023-24 season) 09/01/2023       Problem List Items Addressed This Visit          Derm    Psoriasis - Primary (Chronic)    Relevant Medications    predniSONE (DELTASONE) 10 MG tablet    betamethasone valerate 0.1% (VALISONE) 0.1 % Crea       Other    RESOLVED: NEGRITA (obstructive sleep apnea) (Chronic)       Health Maintenance Topics with due status: Not  Due       Topic Last Completion Date    Hemoglobin A1c (Diabetic Prevention Screening) 08/16/2023    Colorectal Cancer Screening 08/23/2023    Lipid Panel 05/03/2024       Procedures     Future Appointments   Date Time Provider Department Center   9/26/2024 10:15 AM Spike Johnson MD [facility]   10/10/2024  1:00 PM Mariam Lopez MD [facility]   10/31/2024 11:15 AM Cal Stock MD [facility]   11/5/2024  3:30 PM Spike Johnson MD [facility]   5[facility]2025 12:[facility]        Follow up if symptoms worsen or fail to improve.     Signature:  Spike Johnson MD  [address] [place] [address]  Phone #: [phone]  Fax #: [phone]    Date of encounter: 6/13/24    There are no Patient Instructions on file for this visit.

## 2024-07-30 ENCOUNTER — PATIENT OUTREACH (OUTPATIENT)
Facility: HOSPITAL | Age: 49
End: 2024-07-30
Payer: COMMERCIAL

## 2024-07-30 NOTE — PROGRESS NOTES
Population Health Chart Review & Patient Outreach Details  Per [facility] website, insurance is active and patient is enrolled in CM:  CM! Provider CMH! Benefit Start Date CMH! Benefit End Date Baseline Risk Track(s) Baseline Risk Level Current Risk Tracks(s) Current Risk Level   JOSH SERRANO MD 2024 Hypertension Moderate Hypertension Moderate               The Color Me Healthy! biometrics entry should be used only for biometrics associated with Color Me Healthy! services.     Color Me Healthy! biometrics must be submitted by a [email or web address] Provider. Please enter the [employer] Provider's NPI to submit biometrics.  Provider Search   Performing Provider NPI:               The services below are available to the member under Color Me Healthy! when performed by a [email or web address] Provider.  Benefit accumulators are based on Color Me Healthy! benefit periods.  [employer] Services Guide   [employer] Me [employer] Services  CPT Codes     Metabolic Visit  (0 of 4 Completed) View CPT Codes »    Microalbumin  (0 of 1 Completed) View CPT Codes »    Medication Monitoring-Renal  (0 of 2 Completed) View CPT Codes »    Medication Monitoring-Liver  (0 of 2 Completed) View CPT Codes »    Dilated Eye Exam  (0 of 1 Completed) View CPT Codes »    Venipuncture  (0 of 4 Completed) View CPT Codes »         Further Action Needed If Patient Returns Outreach:            Updates Requested / Reviewed:     []  Care Everywhere    []     []  External Sources ([facility], [facility], DIS, etc.)    [] [facility]   [] [facility]   [] Other:    []  Care Team Updated   []  Removed  or Duplicate Orders   []  Immunization Reconciliation Completed / Queried    [] [place]   [] [place]   [] [place]   [] [place]      Health Maintenance Topics Addressed and Outreach Outcomes / Actions Taken:             Breast Cancer Screening []  Mammogram Order Placed    []  Mammogram Screening Scheduled    []   External Records Requested & Care Team Updated if Applicable    []  External Records Uploaded & Care Team Updated if Applicable    []  Pt Declined Scheduling Mammogram    []  Pt Will Schedule with External Provider / Order Routed & Care Team Updated if Applicable              Cervical Cancer Screening []  Pap Smear Scheduled in Primary Care or OBGYN    []  External Records Requested & Care Team Updated if Applicable       []  External Records Uploaded, Care Team Updated, & History Updated if Applicable    []  Patient Declined Scheduling Pap Smear    []  Patient Will Schedule with External Provider & Care Team Updated if Applicable                  Colorectal Cancer Screening []  Colonoscopy Case Request / Referral / Home Test Order Placed    []  External Records Requested & Care Team Updated if Applicable    []  External Records Uploaded, Care Team Updated, & History Updated if Applicable    []  Patient Declined Completing Colon Cancer Screening    []  Patient Will Schedule with External Provider & Care Team Updated if Applicable    []  Fit Kit Mailed (add the SmartPhrase under additional notes)    []  Reminded Patient to Complete Home Test                Diabetic Eye Exam []  Eye Exam Screening Order Placed    []  Eye Camera Scheduled or Optometry/Ophthalmology Referral Placed    []  External Records Requested & Care Team Updated if Applicable    []  External Records Uploaded, Care Team Updated, & History Updated if Applicable    []  Patient Declined Scheduling Eye Exam    []  Patient Will Schedule with External Provider & Care Team Updated if Applicable             Blood Pressure Control []  Primary Care Follow Up Visit Scheduled     []  Remote Blood Pressure Reading Captured    []  Patient Declined Remote Reading or Scheduling Appt - Escalated to PCP    []  Patient Will Call Back or Send Portal Message with Reading                 HbA1c & Other Labs []  Overdue Lab(s) Ordered    []  Overdue Lab(s) Scheduled     []  External Records Uploaded & Care Team Updated if Applicable    []  Primary Care Follow Up Visit Scheduled     []  Reminded Patient to Complete A1c Home Test    []  Patient Declined Scheduling Labs or Will Call Back to Schedule    []  Patient Will Schedule with External Provider / Order Routed, & Care Team Updated if Applicable           Primary Care Appointment []  Primary Care Appt Scheduled    []  Patient Declined Scheduling or Will Call Back to Schedule    []  Pt Established with External Provider, Updated Care Team, & Informed Pt to Notify Payor if Applicable           Medication Adherence /    Statin Use []  Primary Care Appointment Scheduled    []  Patient Reminded to  Prescription    []  Patient Declined, Provider Notified if Needed    []  Sent Provider Message to Review to Evaluate Pt for Statin, Add Exclusion Dx Codes, Document   Exclusion in Problem List, Change Statin Intensity Level to Moderate or High Intensity if Applicable                Osteoporosis Screening []  Dexa Order Placed    []  Dexa Appointment Scheduled    []  External Records Requested & Care Team Updated    []  External Records Uploaded, Care Team Updated, & History Updated if Applicable    []  Patient Declined Scheduling Dexa or Will Call Back to Schedule    []  Patient Will Schedule with External Provider / Order Routed & Care Team Updated if Applicable       Additional Notes:

## 2024-07-31 ENCOUNTER — OFFICE VISIT (OUTPATIENT)
Dept: FAMILY MEDICINE | Facility: CLINIC | Age: 49
End: 2024-07-31
Payer: COMMERCIAL

## 2024-07-31 VITALS
TEMPERATURE: 97 F | RESPIRATION RATE: 16 BRPM | OXYGEN SATURATION: 96 % | DIASTOLIC BLOOD PRESSURE: 67 MMHG | WEIGHT: 315 LBS | SYSTOLIC BLOOD PRESSURE: 132 MMHG | HEART RATE: 83 BPM | BODY MASS INDEX: 36.45 KG/M2 | HEIGHT: 78 IN

## 2024-07-31 DIAGNOSIS — Q82.8 ACCESSORY SKIN TAGS: Chronic | ICD-10-CM

## 2024-07-31 DIAGNOSIS — I10 ESSENTIAL HYPERTENSION: Primary | Chronic | ICD-10-CM

## 2024-07-31 PROCEDURE — 1159F MED LIST DOCD IN RCRD: CPT | Mod: ,,, | Performed by: FAMILY MEDICINE

## 2024-07-31 PROCEDURE — 11200 RMVL SKIN TAGS UP TO&INC 15: CPT | Mod: ,,, | Performed by: FAMILY MEDICINE

## 2024-07-31 PROCEDURE — 3066F NEPHROPATHY DOC TX: CPT | Mod: ,,, | Performed by: FAMILY MEDICINE

## 2024-07-31 PROCEDURE — 99214 OFFICE O/P EST MOD 30 MIN: CPT | Mod: 25,,, | Performed by: FAMILY MEDICINE

## 2024-07-31 PROCEDURE — 3075F SYST BP GE 130 - 139MM HG: CPT | Mod: ,,, | Performed by: FAMILY MEDICINE

## 2024-07-31 PROCEDURE — 1160F RVW MEDS BY RX/DR IN RCRD: CPT | Mod: ,,, | Performed by: FAMILY MEDICINE

## 2024-07-31 PROCEDURE — 4010F ACE/ARB THERAPY RXD/TAKEN: CPT | Mod: ,,, | Performed by: FAMILY MEDICINE

## 2024-07-31 PROCEDURE — 3008F BODY MASS INDEX DOCD: CPT | Mod: ,,, | Performed by: FAMILY MEDICINE

## 2024-07-31 PROCEDURE — 3061F NEG MICROALBUMINURIA REV: CPT | Mod: ,,, | Performed by: FAMILY MEDICINE

## 2024-07-31 PROCEDURE — 3078F DIAST BP <80 MM HG: CPT | Mod: ,,, | Performed by: FAMILY MEDICINE

## 2024-07-31 RX ORDER — LOSARTAN POTASSIUM 50 MG/1
50 TABLET ORAL DAILY
COMMUNITY

## 2024-07-31 NOTE — PROGRESS NOTES
Subjective     Patient ID: Jason Chaudhary is a 48 y.o. male.    Chief Complaint: Color Me Healthy (hypertension) and Skin Tags (Inside of right thigh.)    HPI  Review of Systems   Constitutional:  Negative for activity change, appetite change, chills, fatigue and fever.   HENT:  Negative for nasal congestion, ear pain, hearing loss, postnasal drip and sore throat.    Respiratory:  Negative for cough, chest tightness, shortness of breath and wheezing.    Cardiovascular:  Negative for chest pain, palpitations, leg swelling and claudication.   Gastrointestinal:  Negative for abdominal pain, change in bowel habit, constipation, diarrhea, nausea and vomiting.   Genitourinary:  Negative for dysuria.   Musculoskeletal:  Negative for arthralgias, back pain, gait problem and myalgias.   Integumentary:  Positive for mole/lesion. Negative for rash.   Neurological:  Negative for weakness and headaches.   Psychiatric/Behavioral:  Negative for suicidal ideas. The patient is not nervous/anxious.        Tobacco Use: Low Risk  (7/31/2024)    Patient History     Smoking Tobacco Use: Never     Smokeless Tobacco Use: Never     Passive Exposure: Never     Review of patient's allergies indicates:   Allergen Reactions    Penicillins Hives    Penicillin g potassium Rash and Nausea And Vomiting     Current Outpatient Medications   Medication Instructions    apixaban (ELIQUIS) 5 mg, Oral, 2 times daily    betamethasone valerate 0.1% (VALISONE) 0.1 % Crea Apply a thin layer of cream to the posterior portion of the right leg, twice daily as needed for RASH    fexofenadine (ALLEGRA) 180 mg, Oral, Daily    furosemide (LASIX) 20 mg, Oral, Daily    metoprolol succinate (TOPROL-XL) 25 mg, Oral, Daily    multivit-min-folic acid-vit K (MULTI FOR HIM, NO IRON,) 400-40 mcg Cap as directed Orally    multivitamin (MULTIPLE VITAMINS DAILY ORAL) 1 tablet, Oral, Daily     There are no discontinued medications.    Past Medical History:   Diagnosis  "Date    Allergy     Hypertension     NEGRITA (obstructive sleep apnea)     Paroxysmal atrial fibrillation     Psoriasis      Health Maintenance Topics with due status: Not Due       Topic Last Completion Date    Hemoglobin A1c (Diabetic Prevention Screening) 08/16/2023    Colorectal Cancer Screening 08/23/2023    Influenza Vaccine 02/27/2024    Lipid Panel 05/03/2024     Immunization History   Administered Date(s) Administered    COVID-19, MRNA, LN-S, PF (Pfizer) (Purple Cap) 03/18/2021, 04/08/2021, 12/13/2021    COVID-19, mRNA, LNP-S, bivalent booster, PF (Moderna Omicron)12 + YEARS 11/30/2022    Influenza - Quadrivalent - PF *Preferred* (6 months and older) 11/24/2021, 11/01/2022       Objective     Body mass index is 39.41 kg/m².  Wt Readings from Last 3 Encounters:   07/31/24 (!) 154.7 kg (341 lb)   06/13/24 (!) 156.9 kg (346 lb)   05/03/24 (!) 155.1 kg (342 lb)     Ht Readings from Last 3 Encounters:   07/31/24 6' 6" (1.981 m)   06/13/24 6' 6" (1.981 m)   05/03/24 6' 6" (1.981 m)     BP Readings from Last 3 Encounters:   07/31/24 132/67   06/13/24 (!) 150/87   05/03/24 (!) 148/80     Temp Readings from Last 3 Encounters:   07/31/24 97.1 °F (36.2 °C) (Oral)   06/13/24 98.6 °F (37 °C) (Oral)   05/03/24 98.1 °F (36.7 °C) (Oral)     Pulse Readings from Last 3 Encounters:   07/31/24 83   06/13/24 72   05/03/24 69     Resp Readings from Last 3 Encounters:   07/31/24 16   06/13/24 18   03/14/24 20     PF Readings from Last 3 Encounters:   No data found for PF       Physical Exam  Vitals and nursing note reviewed.   Constitutional:       General: He is not in acute distress.     Appearance: Normal appearance. He is not ill-appearing.   Eyes:      Extraocular Movements: Extraocular movements intact.      Pupils: Pupils are equal, round, and reactive to light.   Cardiovascular:      Rate and Rhythm: Normal rate and regular rhythm.   Pulmonary:      Effort: Pulmonary effort is normal.      Breath sounds: Normal breath sounds. "   Skin:     General: Skin is warm.      Findings: No rash.   Neurological:      General: No focal deficit present.      Mental Status: He is alert and oriented to person, place, and time. Mental status is at baseline.   Psychiatric:         Mood and Affect: Mood normal.         Behavior: Behavior normal.       Procedure Note: Skin tag removal. I cleaned the areas with alcohol in the usual manner. Patient confirmed the location and consented to the procedure. I used tweezers and scissors to remove the skin tag. Bleeding was stopped with direct pressure for several minutes due to patient being on Eliquis. Blood loss less than 3ml. Patient tolerated the procedure well.         Assessment and Plan     Problem List Items Addressed This Visit          Derm    Accessory skin tags (Chronic)       Cardiac/Vascular    Essential hypertension - Primary (Chronic)         Plan:       I have reviewed the medications, allergies, and problem list.     Goal Actions:    What type of visit is the patient here for today?: Color Me Healthy  Which Color Me Healthy program is the patient enrolling in?: Blood Pressure (Hypertension)  Is this a Wellness Follow Up?: Yes  What is your overall wellness goal? (select at least one): Understand disease process  Choose 3: Lifestyle, Nutrition, Exercise  Lifestyle Actions : Take medications as prescribed, BMD if applicable, Develop good support system  Nurtrition Actions: Eat heart healthy diet, Decrease intake of fast food, Avoid carbonated beverages, drink 8-10 glasses of water per day  Exercise Actions: Recommend physical activity 30 minutes per day 3-5 times/week

## 2024-08-01 ENCOUNTER — PATIENT OUTREACH (OUTPATIENT)
Facility: HOSPITAL | Age: 49
End: 2024-08-01
Payer: COMMERCIAL

## 2024-08-01 NOTE — PROGRESS NOTES
Population Health Chart Review & Patient Outreach Details      Further Action Needed If Patient Returns Outreach:            Updates Requested / Reviewed:     []  Care Everywhere    []     []  External Sources ([facility], [facility], DIS, etc.)    [] [facility]   [] [facility]   [] Other:    []  Care Team Updated   []  Removed  or Duplicate Orders   []  Immunization Reconciliation Completed / Queried    [] [place]   [] [place]   [] [place]   [] [place]      Health Maintenance Topics Addressed and Outreach Outcomes / Actions Taken:             Breast Cancer Screening []  Mammogram Order Placed    []  Mammogram Screening Scheduled    []  External Records Requested & Care Team Updated if Applicable    []  External Records Uploaded & Care Team Updated if Applicable    []  Pt Declined Scheduling Mammogram    []  Pt Will Schedule with External Provider / Order Routed & Care Team Updated if Applicable              Cervical Cancer Screening []  Pap Smear Scheduled in Primary Care or OBGYN    []  External Records Requested & Care Team Updated if Applicable       []  External Records Uploaded, Care Team Updated, & History Updated if Applicable    []  Patient Declined Scheduling Pap Smear    []  Patient Will Schedule with External Provider & Care Team Updated if Applicable                  Colorectal Cancer Screening []  Colonoscopy Case Request / Referral / Home Test Order Placed    []  External Records Requested & Care Team Updated if Applicable    []  External Records Uploaded, Care Team Updated, & History Updated if Applicable    []  Patient Declined Completing Colon Cancer Screening    []  Patient Will Schedule with External Provider & Care Team Updated if Applicable    []  Fit Kit Mailed (add the SmartPhrase under additional notes)    []  Reminded Patient to Complete Home Test                Diabetic Eye Exam []  Eye Exam Screening Order Placed    []  Eye Camera Scheduled or Optometry/Ophthalmology Referral  Placed    []  External Records Requested & Care Team Updated if Applicable    []  External Records Uploaded, Care Team Updated, & History Updated if Applicable    []  Patient Declined Scheduling Eye Exam    []  Patient Will Schedule with External Provider & Care Team Updated if Applicable             Blood Pressure Control []  Primary Care Follow Up Visit Scheduled     []  Remote Blood Pressure Reading Captured    []  Patient Declined Remote Reading or Scheduling Appt - Escalated to PCP    []  Patient Will Call Back or Send Portal Message with Reading                 HbA1c & Other Labs []  Overdue Lab(s) Ordered    []  Overdue Lab(s) Scheduled    []  External Records Uploaded & Care Team Updated if Applicable    []  Primary Care Follow Up Visit Scheduled     []  Reminded Patient to Complete A1c Home Test    []  Patient Declined Scheduling Labs or Will Call Back to Schedule    []  Patient Will Schedule with External Provider / Order Routed, & Care Team Updated if Applicable           Primary Care Appointment []  Primary Care Appt Scheduled    []  Patient Declined Scheduling or Will Call Back to Schedule    []  Pt Established with External Provider, Updated Care Team, & Informed Pt to Notify Payor if Applicable           Medication Adherence /    Statin Use []  Primary Care Appointment Scheduled    []  Patient Reminded to  Prescription    []  Patient Declined, Provider Notified if Needed    []  Sent Provider Message to Review to Evaluate Pt for Statin, Add Exclusion Dx Codes, Document   Exclusion in Problem List, Change Statin Intensity Level to Moderate or High Intensity if Applicable                Osteoporosis Screening []  Dexa Order Placed    []  Dexa Appointment Scheduled    []  External Records Requested & Care Team Updated    []  External Records Uploaded, Care Team Updated, & History Updated if Applicable    []  Patient Declined Scheduling Dexa or Will Call Back to Schedule    []  Patient Will Schedule  with External Provider / Order Routed & Care Team Updated if Applicable       Additional Notes:.  Post visit Population Health review of encounter with date of service  7/31/24 with Johnson.  All required CM components in encounter.    Followup appt for: [facility] 11/5/24

## 2024-09-18 RX ORDER — FUROSEMIDE 20 MG/1
20 TABLET ORAL DAILY
Qty: 90 TABLET | Refills: 3 | Status: SHIPPED | OUTPATIENT
Start: 2024-09-18 | End: 2025-09-18

## 2024-10-31 ENCOUNTER — OFFICE VISIT (OUTPATIENT)
Dept: CARDIOLOGY | Facility: CLINIC | Age: 49
End: 2024-10-31
Payer: COMMERCIAL

## 2024-10-31 VITALS
HEART RATE: 66 BPM | SYSTOLIC BLOOD PRESSURE: 140 MMHG | WEIGHT: 315 LBS | HEIGHT: 78 IN | OXYGEN SATURATION: 95 % | BODY MASS INDEX: 36.45 KG/M2 | DIASTOLIC BLOOD PRESSURE: 80 MMHG

## 2024-10-31 DIAGNOSIS — I10 ESSENTIAL HYPERTENSION: Primary | Chronic | ICD-10-CM

## 2024-10-31 DIAGNOSIS — I48.3 TYPICAL ATRIAL FLUTTER: ICD-10-CM

## 2024-10-31 PROCEDURE — 99213 OFFICE O/P EST LOW 20 MIN: CPT | Mod: S$PBB,,, | Performed by: STUDENT IN AN ORGANIZED HEALTH CARE EDUCATION/TRAINING PROGRAM

## 2024-10-31 PROCEDURE — 99999 PR PBB SHADOW E&M-EST. PATIENT-LVL IV: CPT | Mod: PBBFAC,,, | Performed by: STUDENT IN AN ORGANIZED HEALTH CARE EDUCATION/TRAINING PROGRAM

## 2024-10-31 PROCEDURE — 99214 OFFICE O/P EST MOD 30 MIN: CPT | Mod: PBBFAC | Performed by: STUDENT IN AN ORGANIZED HEALTH CARE EDUCATION/TRAINING PROGRAM

## 2024-10-31 PROCEDURE — 3077F SYST BP >= 140 MM HG: CPT | Mod: ,,, | Performed by: STUDENT IN AN ORGANIZED HEALTH CARE EDUCATION/TRAINING PROGRAM

## 2024-10-31 PROCEDURE — 4010F ACE/ARB THERAPY RXD/TAKEN: CPT | Mod: ,,, | Performed by: STUDENT IN AN ORGANIZED HEALTH CARE EDUCATION/TRAINING PROGRAM

## 2024-10-31 PROCEDURE — 3079F DIAST BP 80-89 MM HG: CPT | Mod: ,,, | Performed by: STUDENT IN AN ORGANIZED HEALTH CARE EDUCATION/TRAINING PROGRAM

## 2024-10-31 PROCEDURE — 3066F NEPHROPATHY DOC TX: CPT | Mod: ,,, | Performed by: STUDENT IN AN ORGANIZED HEALTH CARE EDUCATION/TRAINING PROGRAM

## 2024-10-31 PROCEDURE — 3008F BODY MASS INDEX DOCD: CPT | Mod: ,,, | Performed by: STUDENT IN AN ORGANIZED HEALTH CARE EDUCATION/TRAINING PROGRAM

## 2024-10-31 PROCEDURE — 1159F MED LIST DOCD IN RCRD: CPT | Mod: ,,, | Performed by: STUDENT IN AN ORGANIZED HEALTH CARE EDUCATION/TRAINING PROGRAM

## 2024-10-31 PROCEDURE — 3061F NEG MICROALBUMINURIA REV: CPT | Mod: ,,, | Performed by: STUDENT IN AN ORGANIZED HEALTH CARE EDUCATION/TRAINING PROGRAM

## 2024-11-05 ENCOUNTER — OFFICE VISIT (OUTPATIENT)
Dept: FAMILY MEDICINE | Facility: CLINIC | Age: 49
End: 2024-11-05
Payer: COMMERCIAL

## 2024-11-05 VITALS
TEMPERATURE: 98 F | WEIGHT: 315 LBS | BODY MASS INDEX: 36.45 KG/M2 | HEART RATE: 94 BPM | DIASTOLIC BLOOD PRESSURE: 90 MMHG | HEIGHT: 78 IN | OXYGEN SATURATION: 96 % | SYSTOLIC BLOOD PRESSURE: 146 MMHG | RESPIRATION RATE: 16 BRPM

## 2024-11-05 DIAGNOSIS — L40.50 PSORIATIC ARTHRITIS: Chronic | ICD-10-CM

## 2024-11-05 DIAGNOSIS — I48.3 TYPICAL ATRIAL FLUTTER: Chronic | ICD-10-CM

## 2024-11-05 DIAGNOSIS — I48.91 ATRIAL FIBRILLATION WITH RVR: Chronic | ICD-10-CM

## 2024-11-05 DIAGNOSIS — I10 ESSENTIAL HYPERTENSION: Primary | Chronic | ICD-10-CM

## 2024-11-05 DIAGNOSIS — Z23 ENCOUNTER FOR IMMUNIZATION: ICD-10-CM

## 2024-11-05 LAB
ALBUMIN SERPL BCP-MCNC: 3.7 G/DL (ref 3.5–5)
ALBUMIN/GLOB SERPL: 1.1 {RATIO}
ALP SERPL-CCNC: 74 U/L (ref 45–115)
ALT SERPL W P-5'-P-CCNC: 33 U/L (ref 16–61)
ANION GAP SERPL CALCULATED.3IONS-SCNC: 6 MMOL/L (ref 7–16)
AST SERPL W P-5'-P-CCNC: 22 U/L (ref 15–37)
BILIRUB SERPL-MCNC: 0.6 MG/DL (ref ?–1.2)
BUN SERPL-MCNC: 9 MG/DL (ref 7–18)
BUN/CREAT SERPL: 10 (ref 6–20)
CALCIUM SERPL-MCNC: 9.3 MG/DL (ref 8.5–10.1)
CHLORIDE SERPL-SCNC: 108 MMOL/L (ref 98–107)
CHOLEST SERPL-MCNC: 125 MG/DL (ref 0–200)
CHOLEST/HDLC SERPL: 4.3 {RATIO}
CO2 SERPL-SCNC: 30 MMOL/L (ref 21–32)
CREAT SERPL-MCNC: 0.92 MG/DL (ref 0.7–1.3)
CREAT UR-MCNC: 248 MG/DL (ref 39–259)
EGFR (NO RACE VARIABLE) (RUSH/TITUS): 104 ML/MIN/1.73M2
GLOBULIN SER-MCNC: 3.4 G/DL (ref 2–4)
GLUCOSE SERPL-MCNC: 101 MG/DL (ref 74–106)
HDLC SERPL-MCNC: 29 MG/DL (ref 40–60)
LDLC SERPL CALC-MCNC: 71 MG/DL
LDLC/HDLC SERPL: 2.4 {RATIO}
MICROALBUMIN UR-MCNC: 1.8 MG/DL (ref 0–2.8)
MICROALBUMIN/CREAT RATIO PNL UR: 7.3 MG/G (ref 0–30)
NONHDLC SERPL-MCNC: 96 MG/DL
POTASSIUM SERPL-SCNC: 3.3 MMOL/L (ref 3.5–5.1)
PROT SERPL-MCNC: 7.1 G/DL (ref 6.4–8.2)
SODIUM SERPL-SCNC: 141 MMOL/L (ref 136–145)
TRIGL SERPL-MCNC: 127 MG/DL (ref 35–150)
VLDLC SERPL-MCNC: 25 MG/DL

## 2024-11-05 PROCEDURE — 80053 COMPREHEN METABOLIC PANEL: CPT | Mod: ,,, | Performed by: CLINICAL MEDICAL LABORATORY

## 2024-11-05 PROCEDURE — 80061 LIPID PANEL: CPT | Mod: ,,, | Performed by: CLINICAL MEDICAL LABORATORY

## 2024-11-05 PROCEDURE — 82570 ASSAY OF URINE CREATININE: CPT | Mod: ,,, | Performed by: CLINICAL MEDICAL LABORATORY

## 2024-11-05 PROCEDURE — 82043 UR ALBUMIN QUANTITATIVE: CPT | Mod: ,,, | Performed by: CLINICAL MEDICAL LABORATORY

## 2024-11-05 RX ORDER — LOSARTAN POTASSIUM 100 MG/1
100 TABLET ORAL DAILY
Qty: 90 TABLET | Refills: 1 | Status: SHIPPED | OUTPATIENT
Start: 2024-11-05 | End: 2025-11-05

## 2024-11-05 RX ORDER — LOSARTAN POTASSIUM 50 MG/1
50 TABLET ORAL DAILY
Qty: 90 TABLET | Refills: 1 | Status: SHIPPED | OUTPATIENT
Start: 2024-11-05 | End: 2024-11-05

## 2024-11-05 NOTE — PROGRESS NOTES
Subjective     Patient ID: Jason Chaudhary is a 48 y.o. male.    Chief Complaint: Color Me Healthy (hypertension)    HPI  Review of Systems   Constitutional:  Negative for activity change, appetite change, chills, fatigue and fever.   HENT:  Negative for nasal congestion, ear pain, hearing loss, postnasal drip and sore throat.    Respiratory:  Negative for cough, chest tightness, shortness of breath and wheezing.    Cardiovascular:  Negative for chest pain, palpitations, leg swelling and claudication.   Gastrointestinal:  Negative for abdominal pain, change in bowel habit, constipation, diarrhea, nausea and vomiting.   Genitourinary:  Negative for dysuria.   Musculoskeletal:  Negative for arthralgias, back pain, gait problem and myalgias.   Integumentary:  Negative for rash.   Neurological:  Negative for weakness and headaches.   Psychiatric/Behavioral:  Negative for suicidal ideas. The patient is not nervous/anxious.        Tobacco Use: Low Risk  (11/5/2024)    Patient History     Smoking Tobacco Use: Never     Smokeless Tobacco Use: Never     Passive Exposure: Never     Review of patient's allergies indicates:   Allergen Reactions    Penicillins Hives    Penicillin g potassium Rash and Nausea And Vomiting     Current Outpatient Medications   Medication Instructions    apixaban (ELIQUIS) 5 mg, Oral, 2 times daily    fexofenadine (ALLEGRA) 180 mg, Daily    furosemide (LASIX) 20 mg, Oral, Daily    losartan (COZAAR) 100 mg, Oral, Daily, For BLOOD PRESSURE    multivitamin (MULTIPLE VITAMINS DAILY ORAL) 1 tablet, Daily     Medications Discontinued During This Encounter   Medication Reason    betamethasone valerate 0.1% (VALISONE) 0.1 % Crea     metoprolol succinate (TOPROL-XL) 25 MG 24 hr tablet Discontinued by another clinician    multivit-min-folic acid-vit K (MULTI FOR HIM, NO IRON,) 400-40 mcg Cap     apixaban (ELIQUIS) 5 mg Tab Reorder    losartan (COZAAR) 50 MG tablet Reorder    losartan (COZAAR) 50 MG  "tablet        Past Medical History:   Diagnosis Date    Allergy     Hypertension     NEGRITA (obstructive sleep apnea)     Paroxysmal atrial fibrillation     Psoriasis      Health Maintenance Topics with due status: Not Due       Topic Last Completion Date    Hemoglobin A1c (Diabetic Prevention Screening) 08/16/2023    Colorectal Cancer Screening 08/23/2023    Lipid Panel 11/05/2024    RSV Vaccine (Age 60+ and Pregnant patients) Not Due     Immunization History   Administered Date(s) Administered    COVID-19, MRNA, LN-S, PF (Pfizer) (Purple Cap) 03/18/2021, 04/08/2021, 12/13/2021    COVID-19, mRNA, LNP-S, bivalent booster, PF (Moderna Omicron)12 + YEARS 11/30/2022    Influenza - Quadrivalent - PF *Preferred* (6 months and older) 11/24/2021, 11/01/2022    Influenza - Trivalent - Fluarix, Flulaval, Fluzone, Afluria - PF 11/05/2024       Objective     Body mass index is 39.24 kg/m².  Wt Readings from Last 3 Encounters:   11/05/24 (!) 154 kg (339 lb 9.6 oz)   10/31/24 (!) 154.7 kg (341 lb)   07/31/24 (!) 154.7 kg (341 lb)     Ht Readings from Last 3 Encounters:   11/05/24 6' 6" (1.981 m)   10/31/24 6' 6" (1.981 m)   07/31/24 6' 6" (1.981 m)     BP Readings from Last 3 Encounters:   11/05/24 (!) 146/90   10/31/24 (!) 140/80   07/31/24 132/67     Temp Readings from Last 3 Encounters:   11/05/24 97.9 °F (36.6 °C) (Oral)   07/31/24 97.1 °F (36.2 °C) (Oral)   06/13/24 98.6 °F (37 °C) (Oral)     Pulse Readings from Last 3 Encounters:   11/05/24 94   10/31/24 66   07/31/24 83     Resp Readings from Last 3 Encounters:   11/05/24 16   07/31/24 16   06/13/24 18     PF Readings from Last 3 Encounters:   No data found for PF       Physical Exam  Vitals and nursing note reviewed.   Constitutional:       General: He is not in acute distress.     Appearance: Normal appearance. He is not ill-appearing.   Eyes:      Extraocular Movements: Extraocular movements intact.      Pupils: Pupils are equal, round, and reactive to light. "   Cardiovascular:      Rate and Rhythm: Normal rate and regular rhythm.   Pulmonary:      Effort: Pulmonary effort is normal.      Breath sounds: Normal breath sounds.   Skin:     General: Skin is warm.      Findings: No rash.   Neurological:      General: No focal deficit present.      Mental Status: He is alert and oriented to person, place, and time. Mental status is at baseline.   Psychiatric:         Mood and Affect: Mood normal.         Behavior: Behavior normal.         Assessment and Plan     Problem List Items Addressed This Visit          Cardiac/Vascular    Essential hypertension - Primary (Chronic)    Relevant Medications    losartan (COZAAR) 100 MG tablet    Other Relevant Orders    Lipid Panel (Completed)    Microalbumin/Creatinine Ratio, Urine (Completed)    Comprehensive Metabolic Panel (Completed)    Atrial flutter (Chronic)    Relevant Medications    apixaban (ELIQUIS) 5 mg Tab    Atrial fibrillation with RVR (Chronic)    Relevant Medications    apixaban (ELIQUIS) 5 mg Tab       Orthopedic    Psoriatic arthritis (Chronic)    Relevant Orders    Ambulatory referral/consult to Rheumatology     Other Visit Diagnoses       Encounter for immunization        Relevant Medications    influenza (Flulaval, Fluzone, Fluarix) 45 mcg/0.5 mL IM vaccine (> or = 6 mo) 0.5 mL (Completed)            Plan:       I have reviewed the medications, allergies, and problem list.     Goal Actions:    What type of visit is the patient here for today?: Color Me Healthy  Which Color Me Healthy program is the patient enrolling in?: Blood Pressure (Hypertension)  Is this a Wellness Follow Up?: Yes  What is your overall wellness goal? (select at least one): Lifestyle modifications  Choose 3: Lifestyle, Nutrition, Exercise  Lifestyle Actions : BMD if applicable, Take medications as prescribed, Develop good support system  Nurtrition Actions: Eat a well-balanced diet, Eat heart healthy diet, Decrease intake of fast food, drink 8-10  glasses of water per day  Exercise Actions: Recommend physical activity 30 minutes per day 3-5 times/week

## 2024-11-06 ENCOUNTER — PATIENT OUTREACH (OUTPATIENT)
Facility: HOSPITAL | Age: 49
End: 2024-11-06
Payer: COMMERCIAL

## 2024-11-06 NOTE — PROGRESS NOTES
Population Health Chart Review & Patient Outreach Details      Further Action Needed If Patient Returns Outreach:            Updates Requested / Reviewed:     []  Care Everywhere    []     []  External Sources ([facility], [facility], [facility], etc.)    [] [facility]   [] [facility]   [] Other:    []  Care Team Updated   []  Removed  or Duplicate Orders   []  Immunization Reconciliation Completed / Queried    [] [place]   [] [place]   [] [place]   [] [place]      Health Maintenance Topics Addressed and Outreach Outcomes / Actions Taken:             Breast Cancer Screening []  Mammogram Order Placed    []  Mammogram Screening Scheduled    []  External Records Requested & Care Team Updated if Applicable    []  External Records Uploaded & Care Team Updated if Applicable    []  Pt Declined Scheduling Mammogram    []  Pt Will Schedule with External Provider / Order Routed & Care Team Updated if Applicable              Cervical Cancer Screening []  Pap Smear Scheduled in Primary Care or OBGYN    []  External Records Requested & Care Team Updated if Applicable       []  External Records Uploaded, Care Team Updated, & History Updated if Applicable    []  Patient Declined Scheduling Pap Smear    []  Patient Will Schedule with External Provider & Care Team Updated if Applicable                  Colorectal Cancer Screening []  Colonoscopy Case Request / Referral / Home Test Order Placed    []  External Records Requested & Care Team Updated if Applicable    []  External Records Uploaded, Care Team Updated, & History Updated if Applicable    []  Patient Declined Completing Colon Cancer Screening    []  Patient Will Schedule with External Provider & Care Team Updated if Applicable    []  Fit Kit Mailed (add the SmartPhrase under additional notes)    []  Reminded Patient to Complete Home Test                Diabetic Eye Exam []  Eye Exam Screening Order Placed    []  Eye Camera Scheduled or Optometry/Ophthalmology Referral  Placed    []  External Records Requested & Care Team Updated if Applicable    []  External Records Uploaded, Care Team Updated, & History Updated if Applicable    []  Patient Declined Scheduling Eye Exam    []  Patient Will Schedule with External Provider & Care Team Updated if Applicable             Blood Pressure Control []  Primary Care Follow Up Visit Scheduled     []  Remote Blood Pressure Reading Captured    []  Patient Declined Remote Reading or Scheduling Appt - Escalated to PCP    []  Patient Will Call Back or Send Portal Message with Reading                 HbA1c & Other Labs []  Overdue Lab(s) Ordered    []  Overdue Lab(s) Scheduled    []  External Records Uploaded & Care Team Updated if Applicable    []  Primary Care Follow Up Visit Scheduled     []  Reminded Patient to Complete A1c Home Test    []  Patient Declined Scheduling Labs or Will Call Back to Schedule    []  Patient Will Schedule with External Provider / Order Routed, & Care Team Updated if Applicable           Primary Care Appointment []  Primary Care Appt Scheduled    []  Patient Declined Scheduling or Will Call Back to Schedule    []  Pt Established with External Provider, Updated Care Team, & Informed Pt to Notify Payor if Applicable           Medication Adherence /    Statin Use []  Primary Care Appointment Scheduled    []  Patient Reminded to  Prescription    []  Patient Declined, Provider Notified if Needed    []  Sent Provider Message to Review to Evaluate Pt for Statin, Add Exclusion Dx Codes, Document   Exclusion in Problem List, Change Statin Intensity Level to Moderate or High Intensity if Applicable                Osteoporosis Screening []  Dexa Order Placed    []  Dexa Appointment Scheduled    []  External Records Requested & Care Team Updated    []  External Records Uploaded, Care Team Updated, & History Updated if Applicable    []  Patient Declined Scheduling Dexa or Will Call Back to Schedule    []  Patient Will Schedule  with External Provider / Order Routed & Care Team Updated if Applicable       Additional Notes:.  Post visit Population Health review of encounter with date of service  11/5/24 with Johnson. Not  All required CMH components in encounter. Chart  is opened needs primary dx as HTN   Followup appt for: 1/31/25 [facility]

## 2024-11-07 ENCOUNTER — PATIENT MESSAGE (OUTPATIENT)
Dept: FAMILY MEDICINE | Facility: CLINIC | Age: 49
End: 2024-11-07
Payer: COMMERCIAL

## 2024-11-11 DIAGNOSIS — L40.50 PSORIATIC ARTHRITIS: Primary | ICD-10-CM

## 2025-01-31 ENCOUNTER — OFFICE VISIT (OUTPATIENT)
Dept: FAMILY MEDICINE | Facility: CLINIC | Age: 50
End: 2025-01-31
Payer: COMMERCIAL

## 2025-01-31 VITALS
BODY MASS INDEX: 36.45 KG/M2 | WEIGHT: 315 LBS | HEIGHT: 78 IN | OXYGEN SATURATION: 96 % | SYSTOLIC BLOOD PRESSURE: 138 MMHG | HEART RATE: 78 BPM | DIASTOLIC BLOOD PRESSURE: 82 MMHG | TEMPERATURE: 98 F | RESPIRATION RATE: 18 BRPM

## 2025-01-31 DIAGNOSIS — L40.53 PSORIATIC SPONDYLITIS: Chronic | ICD-10-CM

## 2025-01-31 DIAGNOSIS — L40.9 PSORIASIS: Chronic | ICD-10-CM

## 2025-01-31 DIAGNOSIS — I10 ESSENTIAL HYPERTENSION: Chronic | ICD-10-CM

## 2025-01-31 DIAGNOSIS — Z23 ENCOUNTER FOR IMMUNIZATION: Primary | ICD-10-CM

## 2025-01-31 DIAGNOSIS — M15.0 PRIMARY OSTEOARTHRITIS INVOLVING MULTIPLE JOINTS: Chronic | ICD-10-CM

## 2025-01-31 DIAGNOSIS — I48.3 TYPICAL ATRIAL FLUTTER: Chronic | ICD-10-CM

## 2025-01-31 DIAGNOSIS — L40.50 PSORIATIC ARTHRITIS: Chronic | ICD-10-CM

## 2025-01-31 PROCEDURE — 90677 PCV20 VACCINE IM: CPT | Mod: ,,, | Performed by: FAMILY MEDICINE

## 2025-01-31 PROCEDURE — 3075F SYST BP GE 130 - 139MM HG: CPT | Mod: ,,, | Performed by: FAMILY MEDICINE

## 2025-01-31 PROCEDURE — 1159F MED LIST DOCD IN RCRD: CPT | Mod: ,,, | Performed by: FAMILY MEDICINE

## 2025-01-31 PROCEDURE — 99214 OFFICE O/P EST MOD 30 MIN: CPT | Mod: 25,,, | Performed by: FAMILY MEDICINE

## 2025-01-31 PROCEDURE — 3008F BODY MASS INDEX DOCD: CPT | Mod: ,,, | Performed by: FAMILY MEDICINE

## 2025-01-31 PROCEDURE — 1160F RVW MEDS BY RX/DR IN RCRD: CPT | Mod: ,,, | Performed by: FAMILY MEDICINE

## 2025-01-31 PROCEDURE — 90471 IMMUNIZATION ADMIN: CPT | Mod: ,,, | Performed by: FAMILY MEDICINE

## 2025-01-31 PROCEDURE — 3079F DIAST BP 80-89 MM HG: CPT | Mod: ,,, | Performed by: FAMILY MEDICINE

## 2025-01-31 NOTE — PROGRESS NOTES
Subjective     Patient ID: Jason Chaudhary is a 49 y.o. male.    Chief Complaint: Color Me Healthy    HPI  Review of Systems   Constitutional:  Negative for activity change, appetite change, chills, fatigue and fever.   HENT:  Negative for nasal congestion, ear pain, hearing loss, postnasal drip and sore throat.    Respiratory:  Negative for cough, chest tightness, shortness of breath and wheezing.    Cardiovascular:  Negative for chest pain, palpitations, leg swelling and claudication.   Gastrointestinal:  Negative for abdominal pain, change in bowel habit, constipation, diarrhea, nausea and vomiting.   Genitourinary:  Negative for dysuria.   Musculoskeletal:  Negative for arthralgias, back pain, gait problem and myalgias.   Integumentary:  Negative for rash.   Neurological:  Negative for weakness and headaches.   Psychiatric/Behavioral:  Negative for suicidal ideas. The patient is not nervous/anxious.        Tobacco Use: Low Risk  (1/31/2025)    Patient History     Smoking Tobacco Use: Never     Smokeless Tobacco Use: Never     Passive Exposure: Never     Review of patient's allergies indicates:   Allergen Reactions    Penicillins Hives    Penicillin g potassium Rash and Nausea And Vomiting     Current Outpatient Medications   Medication Instructions    apixaban (ELIQUIS) 5 mg, Oral, 2 times daily    fexofenadine (ALLEGRA) 180 mg, Daily    furosemide (LASIX) 20 mg, Oral, Daily    losartan (COZAAR) 100 mg, Oral, Daily, For BLOOD PRESSURE    multivitamin (MULTIPLE VITAMINS DAILY ORAL) 1 tablet, Daily     There are no discontinued medications.    Past Medical History:   Diagnosis Date    Allergy     Hypertension     NEGRITA (obstructive sleep apnea)     Paroxysmal atrial fibrillation     Psoriasis      Health Maintenance Topics with due status: Not Due       Topic Last Completion Date    Hemoglobin A1c (Diabetic Prevention Screening) 08/16/2023    Colorectal Cancer Screening 08/23/2023    Lipid Panel 11/05/2024  "   RSV Vaccine (Age 60+ and Pregnant patients) Not Due     Immunization History   Administered Date(s) Administered    COVID-19, MRNA, LN-S, PF (Pfizer) (Purple Cap) 03/18/2021, 04/08/2021, 12/13/2021    COVID-19, mRNA, LNP-S, bivalent booster, PF (Moderna Omicron)12 + YEARS 11/30/2022    Influenza - Quadrivalent - PF *Preferred* (6 months and older) 11/24/2021, 11/01/2022    Influenza - Trivalent - Fluarix, Flulaval, Fluzone, Afluria - PF 11/05/2024       Objective     Body mass index is 38.37 kg/m².  Wt Readings from Last 3 Encounters:   01/31/25 (!) 150.6 kg (332 lb)   11/05/24 (!) 154 kg (339 lb 9.6 oz)   10/31/24 (!) 154.7 kg (341 lb)     Ht Readings from Last 3 Encounters:   01/31/25 6' 6" (1.981 m)   11/05/24 6' 6" (1.981 m)   10/31/24 6' 6" (1.981 m)     BP Readings from Last 3 Encounters:   01/31/25 138/82   11/05/24 (!) 146/90   10/31/24 (!) 140/80     Temp Readings from Last 3 Encounters:   01/31/25 98 °F (36.7 °C) (Oral)   11/05/24 97.9 °F (36.6 °C) (Oral)   07/31/24 97.1 °F (36.2 °C) (Oral)     Pulse Readings from Last 3 Encounters:   01/31/25 78   11/05/24 94   10/31/24 66     Resp Readings from Last 3 Encounters:   01/31/25 18   11/05/24 16   07/31/24 16     PF Readings from Last 3 Encounters:   No data found for PF       Physical Exam  Vitals and nursing note reviewed.   Constitutional:       General: He is not in acute distress.     Appearance: Normal appearance. He is not ill-appearing.   Eyes:      Extraocular Movements: Extraocular movements intact.      Pupils: Pupils are equal, round, and reactive to light.   Cardiovascular:      Rate and Rhythm: Normal rate and regular rhythm.   Pulmonary:      Effort: Pulmonary effort is normal.      Breath sounds: Normal breath sounds.   Skin:     General: Skin is warm.      Findings: No rash.   Neurological:      General: No focal deficit present.      Mental Status: He is alert and oriented to person, place, and time. Mental status is at baseline. "   Psychiatric:         Mood and Affect: Mood normal.         Behavior: Behavior normal.         Assessment and Plan     Problem List Items Addressed This Visit          Derm    Psoriasis (Chronic)       Cardiac/Vascular    Essential hypertension - Primary (Chronic)    Atrial flutter (Chronic)       Orthopedic    Primary osteoarthritis involving multiple joints (Chronic)    Psoriatic spondylitis (Chronic)    Psoriatic arthritis (Chronic)       Plan:       I have  reviewed the medications, allergies, and problem list.     Goal Actions:    What type of visit is the patient here for today?: Color Me Healthy  Which Color Me Healthy program is the patient enrolling in?: Blood Pressure (Hypertension)  Is this a Wellness Follow Up?: Yes  What is your overall wellness goal? (select at least one): Lifestyle modifications, Lose weight, Understand disease process, Improve overall health  Choose 3: Lifestyle, Nutrition, Exercise  Lifestyle Actions : Take medications as prescribed  Nurtrition Actions: Eat a well-balanced diet, Avoid adding table salt  Exercise Actions: Recommend physical activity 30 minutes per day 3-5 times/week

## 2025-02-03 ENCOUNTER — PATIENT OUTREACH (OUTPATIENT)
Facility: HOSPITAL | Age: 50
End: 2025-02-03
Payer: COMMERCIAL

## 2025-02-03 NOTE — PROGRESS NOTES
Population Health Chart Review & Patient Outreach Details  Post visit Population Health review of encounter with date of service  25 with Johnson.  All required CMH components in encounter.        Further Action Needed If Patient Returns Outreach:            Updates Requested / Reviewed:     []  Care Everywhere    []     []  External Sources ([facility], [facility], [facility], etc.)    [] [facility]   [] [facility]   [] Other:    []  Care Team Updated   []  Removed  or Duplicate Orders   []  Immunization Reconciliation Completed / Queried    [] [place]   [] [place]   [] [place]   [] [place]      Health Maintenance Topics Addressed and Outreach Outcomes / Actions Taken:             Breast Cancer Screening []  Mammogram Order Placed    []  Mammogram Screening Scheduled    []  External Records Requested & Care Team Updated if Applicable    []  External Records Uploaded & Care Team Updated if Applicable    []  Pt Declined Scheduling Mammogram    []  Pt Will Schedule with External Provider / Order Routed & Care Team Updated if Applicable              Cervical Cancer Screening []  Pap Smear Scheduled in Primary Care or OBGYN    []  External Records Requested & Care Team Updated if Applicable       []  External Records Uploaded, Care Team Updated, & History Updated if Applicable    []  Patient Declined Scheduling Pap Smear    []  Patient Will Schedule with External Provider & Care Team Updated if Applicable                  Colorectal Cancer Screening []  Colonoscopy Case Request / Referral / Home Test Order Placed    []  External Records Requested & Care Team Updated if Applicable    []  External Records Uploaded, Care Team Updated, & History Updated if Applicable    []  Patient Declined Completing Colon Cancer Screening    []  Patient Will Schedule with External Provider & Care Team Updated if Applicable    []  Fit Kit Mailed (add the SmartPhrase under additional notes)    []  Reminded Patient to Complete Home Test                 Diabetic Eye Exam []  Eye Exam Screening Order Placed    []  Eye Camera Scheduled or Optometry/Ophthalmology Referral Placed    []  External Records Requested & Care Team Updated if Applicable    []  External Records Uploaded, Care Team Updated, & History Updated if Applicable    []  Patient Declined Scheduling Eye Exam    []  Patient Will Schedule with External Provider & Care Team Updated if Applicable             Blood Pressure Control []  Primary Care Follow Up Visit Scheduled     []  Remote Blood Pressure Reading Captured    []  Patient Declined Remote Reading or Scheduling Appt - Escalated to PCP    []  Patient Will Call Back or Send Portal Message with Reading                 HbA1c & Other Labs []  Overdue Lab(s) Ordered    []  Overdue Lab(s) Scheduled    []  External Records Uploaded & Care Team Updated if Applicable    []  Primary Care Follow Up Visit Scheduled     []  Reminded Patient to Complete A1c Home Test    []  Patient Declined Scheduling Labs or Will Call Back to Schedule    []  Patient Will Schedule with External Provider / Order Routed, & Care Team Updated if Applicable           Primary Care Appointment []  Primary Care Appt Scheduled    []  Patient Declined Scheduling or Will Call Back to Schedule    []  Pt Established with External Provider, Updated Care Team, & Informed Pt to Notify Payor if Applicable           Medication Adherence /    Statin Use []  Primary Care Appointment Scheduled    []  Patient Reminded to  Prescription    []  Patient Declined, Provider Notified if Needed    []  Sent Provider Message to Review to Evaluate Pt for Statin, Add Exclusion Dx Codes, Document   Exclusion in Problem List, Change Statin Intensity Level to Moderate or High Intensity if Applicable                Osteoporosis Screening []  Dexa Order Placed    []  Dexa Appointment Scheduled    []  External Records Requested & Care Team Updated    []  External Records Uploaded, Care Team  Updated, & History Updated if Applicable    []  Patient Declined Scheduling Dexa or Will Call Back to Schedule    []  Patient Will Schedule with External Provider / Order Routed & Care Team Updated if Applicable       Additional Notes:

## 2025-02-06 ENCOUNTER — OFFICE VISIT (OUTPATIENT)
Dept: DERMATOLOGY | Facility: CLINIC | Age: 50
End: 2025-02-06
Payer: COMMERCIAL

## 2025-02-06 DIAGNOSIS — L40.50 PSORIATIC ARTHRITIS: ICD-10-CM

## 2025-02-06 DIAGNOSIS — L73.8 SEBACEOUS HYPERPLASIA: Primary | ICD-10-CM

## 2025-02-06 DIAGNOSIS — L72.0 EPIDERMAL CYST: ICD-10-CM

## 2025-02-06 DIAGNOSIS — L30.9 DERMATITIS, UNSPECIFIED: ICD-10-CM

## 2025-02-06 PROCEDURE — 99204 OFFICE O/P NEW MOD 45 MIN: CPT | Mod: ,,, | Performed by: STUDENT IN AN ORGANIZED HEALTH CARE EDUCATION/TRAINING PROGRAM

## 2025-02-06 PROCEDURE — 1159F MED LIST DOCD IN RCRD: CPT | Mod: ,,, | Performed by: STUDENT IN AN ORGANIZED HEALTH CARE EDUCATION/TRAINING PROGRAM

## 2025-02-06 NOTE — PROGRESS NOTES
[facility]  Ulysses Lopez MD    [address] [address]  [phone]    Fax: [phone]    Patient Name: Jason Chaudhary  Medical Record Number: [id number]  PCP: Spike Johnson MD  Age: 49 y.o. : 1975  Contact: [phone] (home)     CC: rash on legs   History of Present Illness:     Jason Chaudhary is a 49 y.o.  male with no history of skin cancer  who presents to clinic today for rash on bilateral legs that is suspected to be psoriasis. It is nor present today but there are photos in the chart from a flare in . His most recent flare was treated with oral steroids, which did not cause a rebound flare upon cessation. He has a history of psoriatic arthritis and saw Dr Day briefly prior to her departure. He has tried Humira, and Tremfya, but patient reports neither of this improved his joint pain or his rash. Patient has appointment with Rheumatology in August at [facility] in [place].     The patient has no other concerns today.    Review of Systems:     Unremarkable other than mentioned above.     Physical Exam:     General: Relaxed, oriented, alert    Skin examination of the scalp, face, neck, chest, back, abdomen, upper extremities and lower extremities were normal except for as listed below      Assessment and Plan:     1. Dermatitis Unspecified  - rash not present today, but photos reveal lichenified plaque on L shin and posterior thigh     DDx: lichenified eczema vs psoriasis     Plan:  - We discussed it is important to evaluate the rash in person to confirm diagnosis of psoriasis. Patient will call when rash is flared for biopsy        Counseling  I counseled the patient regarding the following:  Skin care: Patient instructed to use gentle skin care including dove unscented soap, CeraVe moisturizing cream, and fragrance free laundry detergent.  Expectations: The patient understands that there is not a definitive diagnosis  at this time. Further testing or empiric therapy may be necessary to diagnose and improve the condition.  Contact office if: The patient develops a fever, or rash dramatically worsens despite treatment.      2. Sebaceous Hyperplasia  - orange-yellow papules with telangiectasia  - Patient was educated on the benign nature of these.       3. Epidermal Cyst  - subcutaneous cyst with prominent follicular pore located on the R axilla     Plan:   Epidermal Cysts can be excised if necessary.      4. History of psoriatic arthritis     - it is unclear if this is PsA to me, given no improvement while on Humira and Tremfya. Will refer to Dr. Jason Freire in [place] as they can get him in sooner than Floyd Lopez MD   Mohs Surgery/Dermatologic Oncology  Dermatology

## 2025-02-27 ENCOUNTER — PATIENT OUTREACH (OUTPATIENT)
Dept: FAMILY MEDICINE | Facility: CLINIC | Age: 50
End: 2025-02-27
Payer: COMMERCIAL

## 2025-02-27 NOTE — PROGRESS NOTES
Population Health Chart Review & Patient Outreach Details  Per [facility] website, insurance is active and pt is listed on the attributed list needing a healthy you performed in   HY scheduled for 2025    Further Action Needed If Patient Returns Outreach:            Updates Requested / Reviewed:     []  Care Everywhere    []     []  External Sources ([facility], [facility], [facility], etc.)    [] [facility]   [] [facility]   [] Other:    []  Care Team Updated   []  Removed  or Duplicate Orders   []  Immunization Reconciliation Completed / Queried    [] [place]   [] [place]   [] [place]   [] [place]      Health Maintenance Topics Addressed and Outreach Outcomes / Actions Taken:             Breast Cancer Screening []  Mammogram Order Placed    []  Mammogram Screening Scheduled    []  External Records Requested & Care Team Updated if Applicable    []  External Records Uploaded & Care Team Updated if Applicable    []  Pt Declined Scheduling Mammogram    []  Pt Will Schedule with External Provider / Order Routed & Care Team Updated if Applicable              Cervical Cancer Screening []  Pap Smear Scheduled in Primary Care or OBGYN    []  External Records Requested & Care Team Updated if Applicable       []  External Records Uploaded, Care Team Updated, & History Updated if Applicable    []  Patient Declined Scheduling Pap Smear    []  Patient Will Schedule with External Provider & Care Team Updated if Applicable                  Colorectal Cancer Screening []  Colonoscopy Case Request / Referral / Home Test Order Placed    []  External Records Requested & Care Team Updated if Applicable    []  External Records Uploaded, Care Team Updated, & History Updated if Applicable    []  Patient Declined Completing Colon Cancer Screening    []  Patient Will Schedule with External Provider & Care Team Updated if Applicable    []  Fit Kit Mailed (add the SmartPhrase under additional notes)    []  Reminded Patient to Complete  Home Test                Diabetic Eye Exam []  Eye Exam Screening Order Placed    []  Eye Camera Scheduled or Optometry/Ophthalmology Referral Placed    []  External Records Requested & Care Team Updated if Applicable    []  External Records Uploaded, Care Team Updated, & History Updated if Applicable    []  Patient Declined Scheduling Eye Exam    []  Patient Will Schedule with External Provider & Care Team Updated if Applicable             Blood Pressure Control []  Primary Care Follow Up Visit Scheduled     []  Remote Blood Pressure Reading Captured    []  Patient Declined Remote Reading or Scheduling Appt - Escalated to PCP    []  Patient Will Call Back or Send Portal Message with Reading                 HbA1c & Other Labs []  Overdue Lab(s) Ordered    []  Overdue Lab(s) Scheduled    []  External Records Uploaded & Care Team Updated if Applicable    []  Primary Care Follow Up Visit Scheduled     []  Reminded Patient to Complete A1c Home Test    []  Patient Declined Scheduling Labs or Will Call Back to Schedule    []  Patient Will Schedule with External Provider / Order Routed, & Care Team Updated if Applicable           Primary Care Appointment []  Primary Care Appt Scheduled    []  Patient Declined Scheduling or Will Call Back to Schedule    []  Pt Established with External Provider, Updated Care Team, & Informed Pt to Notify Payor if Applicable           Medication Adherence /    Statin Use []  Primary Care Appointment Scheduled    []  Patient Reminded to  Prescription    []  Patient Declined, Provider Notified if Needed    []  Sent Provider Message to Review to Evaluate Pt for Statin, Add Exclusion Dx Codes, Document   Exclusion in Problem List, Change Statin Intensity Level to Moderate or High Intensity if Applicable                Osteoporosis Screening []  Dexa Order Placed    []  Dexa Appointment Scheduled    []  External Records Requested & Care Team Updated    []  External Records Uploaded, Care  Team Updated, & History Updated if Applicable    []  Patient Declined Scheduling Dexa or Will Call Back to Schedule    []  Patient Will Schedule with External Provider / Order Routed & Care Team Updated if Applicable       Additional Notes:

## 2025-05-30 ENCOUNTER — PATIENT OUTREACH (OUTPATIENT)
Facility: HOSPITAL | Age: 50
End: 2025-05-30
Payer: COMMERCIAL

## (undated) DEVICE — ELECTRODE EKG QUICK COMBO